# Patient Record
Sex: FEMALE | Race: BLACK OR AFRICAN AMERICAN | HISPANIC OR LATINO | Employment: UNEMPLOYED | URBAN - METROPOLITAN AREA
[De-identification: names, ages, dates, MRNs, and addresses within clinical notes are randomized per-mention and may not be internally consistent; named-entity substitution may affect disease eponyms.]

---

## 2017-01-12 ENCOUNTER — HOSPITAL ENCOUNTER (EMERGENCY)
Facility: HOSPITAL | Age: 52
Discharge: HOME | End: 2017-01-12
Admitting: EMERGENCY MEDICINE
Payer: COMMERCIAL

## 2017-01-12 VITALS
SYSTOLIC BLOOD PRESSURE: 165 MMHG | OXYGEN SATURATION: 98 % | DIASTOLIC BLOOD PRESSURE: 87 MMHG | WEIGHT: 161.16 LBS | HEART RATE: 61 BPM | RESPIRATION RATE: 16 BRPM | TEMPERATURE: 97.6 F

## 2017-01-12 DIAGNOSIS — J06.9 URI (UPPER RESPIRATORY INFECTION): Primary | ICD-10-CM

## 2017-01-12 PROCEDURE — 99283 EMERGENCY DEPT VISIT LOW MDM: CPT

## 2017-01-12 RX ORDER — GUAIFENESIN 200 MG/1
400 TABLET ORAL EVERY 4 HOURS PRN
Qty: 30 TABLET | Refills: 0 | Status: SHIPPED | OUTPATIENT
Start: 2017-01-12 | End: 2019-07-03

## 2017-01-12 RX ORDER — AZITHROMYCIN 250 MG/1
TABLET, FILM COATED ORAL
Qty: 6 TABLET | Refills: 0 | Status: SHIPPED | OUTPATIENT
Start: 2017-01-12 | End: 2019-07-03

## 2017-01-12 RX ADMIN — DEXAMETHASONE SODIUM PHOSPHATE 10 MG: 10 INJECTION, SOLUTION INTRAMUSCULAR; INTRAVENOUS at 20:11

## 2019-05-06 ENCOUNTER — HOSPITAL ENCOUNTER (EMERGENCY)
Facility: HOSPITAL | Age: 54
Discharge: HOME | End: 2019-05-06
Attending: EMERGENCY MEDICINE | Admitting: EMERGENCY MEDICINE
Payer: COMMERCIAL

## 2019-05-06 VITALS
OXYGEN SATURATION: 96 % | WEIGHT: 181 LBS | DIASTOLIC BLOOD PRESSURE: 78 MMHG | SYSTOLIC BLOOD PRESSURE: 119 MMHG | RESPIRATION RATE: 16 BRPM | TEMPERATURE: 96.9 F | HEART RATE: 65 BPM

## 2019-05-06 DIAGNOSIS — M54.9 BACK PAIN: ICD-10-CM

## 2019-05-06 DIAGNOSIS — M54.30 SCIATICA: Primary | ICD-10-CM

## 2019-05-06 PROCEDURE — 99283 EMERGENCY DEPT VISIT LOW MDM: CPT | Performed by: PHYSICIAN ASSISTANT

## 2019-05-06 PROCEDURE — 99283 EMERGENCY DEPT VISIT LOW MDM: CPT

## 2019-05-06 RX ORDER — ACETAMINOPHEN 325 MG/1
650 TABLET ORAL ONCE
Status: COMPLETED | OUTPATIENT
Start: 2019-05-06 | End: 2019-05-06

## 2019-05-06 RX ORDER — IBUPROFEN 600 MG/1
600 TABLET ORAL EVERY 6 HOURS PRN
Qty: 30 TABLET | Refills: 0 | Status: SHIPPED | OUTPATIENT
Start: 2019-05-06 | End: 2019-07-03

## 2019-05-06 RX ADMIN — ACETAMINOPHEN 650 MG: 325 TABLET ORAL at 16:38

## 2019-07-03 ENCOUNTER — APPOINTMENT (EMERGENCY)
Dept: RADIOLOGY | Facility: HOSPITAL | Age: 54
End: 2019-07-03
Payer: COMMERCIAL

## 2019-07-03 ENCOUNTER — HOSPITAL ENCOUNTER (EMERGENCY)
Facility: HOSPITAL | Age: 54
Discharge: HOME | End: 2019-07-04
Attending: EMERGENCY MEDICINE | Admitting: EMERGENCY MEDICINE
Payer: COMMERCIAL

## 2019-07-03 ENCOUNTER — TELEPHONE (OUTPATIENT)
Dept: OTHER | Facility: HOSPITAL | Age: 54
End: 2019-07-03

## 2019-07-03 VITALS
HEART RATE: 71 BPM | SYSTOLIC BLOOD PRESSURE: 174 MMHG | RESPIRATION RATE: 16 BRPM | DIASTOLIC BLOOD PRESSURE: 102 MMHG | OXYGEN SATURATION: 98 % | TEMPERATURE: 96.4 F | WEIGHT: 173.28 LBS

## 2019-07-03 DIAGNOSIS — S16.1XXA ACUTE STRAIN OF NECK MUSCLE, INITIAL ENCOUNTER: ICD-10-CM

## 2019-07-03 DIAGNOSIS — I10 HYPERTENSION: ICD-10-CM

## 2019-07-03 DIAGNOSIS — M54.2 NECK PAIN: Primary | ICD-10-CM

## 2019-07-03 LAB
ANION GAP SERPL CALCULATED.3IONS-SCNC: 7 MMOL/L (ref 5–14)
BACTERIA UR QL AUTO: ABNORMAL /HPF
BILIRUB UR QL STRIP: NEGATIVE
BUN SERPL-MCNC: 11 MG/DL (ref 5–25)
CALCIUM SERPL-MCNC: 8.7 MG/DL (ref 8.4–10.2)
CAOX CRY URNS QL MICRO: ABNORMAL /HPF
CHLORIDE SERPL-SCNC: 104 MMOL/L (ref 97–108)
CLARITY UR: ABNORMAL
CO2 SERPL-SCNC: 29 MMOL/L (ref 22–30)
COLOR UR: YELLOW
CREAT SERPL-MCNC: 0.48 MG/DL (ref 0.6–1.2)
GFR SERPL CREATININE-BSD FRML MDRD: 112 ML/MIN/1.73SQ M
GLUCOSE SERPL-MCNC: 152 MG/DL (ref 70–99)
GLUCOSE UR STRIP-MCNC: NEGATIVE MG/DL
HGB UR QL STRIP.AUTO: 10
KETONES UR STRIP-MCNC: ABNORMAL MG/DL
LEUKOCYTE ESTERASE UR QL STRIP: 100
NITRITE UR QL STRIP: NEGATIVE
NON-SQ EPI CELLS URNS QL MICRO: ABNORMAL /HPF
PH UR STRIP.AUTO: 6 [PH]
POTASSIUM SERPL-SCNC: 4.1 MMOL/L (ref 3.6–5)
PROT UR STRIP-MCNC: ABNORMAL MG/DL
RBC #/AREA URNS AUTO: ABNORMAL /HPF
SODIUM SERPL-SCNC: 140 MMOL/L (ref 137–147)
SP GR UR STRIP.AUTO: 1.02 (ref 1–1.04)
UROBILINOGEN UA: 4 MG/DL
WBC #/AREA URNS AUTO: ABNORMAL /HPF

## 2019-07-03 PROCEDURE — 81003 URINALYSIS AUTO W/O SCOPE: CPT | Performed by: PHYSICIAN ASSISTANT

## 2019-07-03 PROCEDURE — 99284 EMERGENCY DEPT VISIT MOD MDM: CPT

## 2019-07-03 PROCEDURE — 80048 BASIC METABOLIC PNL TOTAL CA: CPT | Performed by: PHYSICIAN ASSISTANT

## 2019-07-03 PROCEDURE — 36415 COLL VENOUS BLD VENIPUNCTURE: CPT | Performed by: PHYSICIAN ASSISTANT

## 2019-07-03 PROCEDURE — 72040 X-RAY EXAM NECK SPINE 2-3 VW: CPT

## 2019-07-03 PROCEDURE — 96372 THER/PROPH/DIAG INJ SC/IM: CPT

## 2019-07-03 PROCEDURE — 81001 URINALYSIS AUTO W/SCOPE: CPT | Performed by: PHYSICIAN ASSISTANT

## 2019-07-03 RX ORDER — METHOCARBAMOL 500 MG/1
500 TABLET, FILM COATED ORAL ONCE
Status: COMPLETED | OUTPATIENT
Start: 2019-07-03 | End: 2019-07-03

## 2019-07-03 RX ORDER — LISINOPRIL 10 MG/1
5 TABLET ORAL ONCE
Status: COMPLETED | OUTPATIENT
Start: 2019-07-04 | End: 2019-07-04

## 2019-07-03 RX ORDER — KETOROLAC TROMETHAMINE 30 MG/ML
15 INJECTION, SOLUTION INTRAMUSCULAR; INTRAVENOUS ONCE
Status: COMPLETED | OUTPATIENT
Start: 2019-07-03 | End: 2019-07-03

## 2019-07-03 RX ADMIN — KETOROLAC TROMETHAMINE 15 MG: 30 INJECTION, SOLUTION INTRAMUSCULAR; INTRAVENOUS at 22:19

## 2019-07-03 RX ADMIN — METHOCARBAMOL 500 MG: 500 TABLET, FILM COATED ORAL at 22:20

## 2019-07-04 PROCEDURE — 93005 ELECTROCARDIOGRAM TRACING: CPT

## 2019-07-04 PROCEDURE — 99284 EMERGENCY DEPT VISIT MOD MDM: CPT | Performed by: PHYSICIAN ASSISTANT

## 2019-07-04 RX ORDER — NITROFURANTOIN 25; 75 MG/1; MG/1
100 CAPSULE ORAL 2 TIMES DAILY
Qty: 10 CAPSULE | Refills: 0 | Status: SHIPPED | OUTPATIENT
Start: 2019-07-04 | End: 2019-09-06

## 2019-07-04 RX ORDER — METHOCARBAMOL 500 MG/1
500 TABLET, FILM COATED ORAL 2 TIMES DAILY
Qty: 20 TABLET | Refills: 0 | Status: SHIPPED | OUTPATIENT
Start: 2019-07-04 | End: 2019-09-06

## 2019-07-04 RX ORDER — ACETAMINOPHEN 325 MG/1
650 TABLET ORAL EVERY 6 HOURS PRN
Qty: 30 TABLET | Refills: 0 | Status: SHIPPED | OUTPATIENT
Start: 2019-07-04 | End: 2019-09-06

## 2019-07-04 RX ORDER — LISINOPRIL 5 MG/1
5 TABLET ORAL DAILY
Qty: 10 TABLET | Refills: 0 | Status: SHIPPED | OUTPATIENT
Start: 2019-07-04 | End: 2019-09-06

## 2019-07-04 RX ADMIN — LISINOPRIL 5 MG: 10 TABLET ORAL at 00:11

## 2019-07-04 NOTE — TELEPHONE ENCOUNTER
Received request from [facility] in the ER to schedule patient for appointment  She had elevated blood pressure and has been off her medications for a long time  We advised to resume lisinopril 5 mg since she was on it before and seemed to tolerate it  Confirmed that the provider verified phone number is correct in the chart  Can you please reach out to the patient and schedule an appointment? Thanks

## 2019-07-04 NOTE — ED PROVIDER NOTES
History  Chief Complaint   Patient presents with    Neck Pain     left sided, x3 days  took advil this morning  Sindy Wolf is a 49 y/o F with PMHx of HTN and noncompliance, asthma, and tobacco abuse who presents today for L sided neck pain x 3 days  The patient reports no headache/dizzines/blurred vision  She reports she is not taking her asthma medications  She states that she has not been to see her PCP since 2017  No n/v/d  The patient reports she awoke with her neck pain about 3 days ago  She has not taken anything for relief  She reports no numbness/tingling  No decreased strength or ROM  No trauma or injury  History provided by:  Patient  Neck Pain   Pain location:  L side  Quality:  Aching and cramping  Pain radiates to:  Does not radiate  Pain severity:  Moderate  Pain is:  Same all the time  Onset quality:  Gradual  Duration:  3 days  Timing:  Constant  Progression:  Worsening  Chronicity:  New  Context: not fall, not jumping from heights, not lifting a heavy object, not MCA, not MVC, not pedestrian accident and not recent injury    Relieved by:  NSAIDs  Worsened by:  Position  Ineffective treatments:  None tried  Associated symptoms: no bladder incontinence, no bowel incontinence, no chest pain, no fever, no headaches, no leg pain, no numbness, no paresis, no photophobia, no syncope, no tingling, no visual change, no weakness and no weight loss        None       Past Medical History:   Diagnosis Date    Asthma     Hypertension        Past Surgical History:   Procedure Laterality Date     SECTION      CHOLECYSTECTOMY         History reviewed  No pertinent family history  I have reviewed and agree with the history as documented      Social History     Tobacco Use    Smoking status: Current Every Day Smoker     Packs/day: 0 50     Types: Cigarettes    Smokeless tobacco: Never Used   Substance Use Topics    Alcohol use: No    Drug use: No        Review of Systems Constitutional: Negative  Negative for fever and weight loss  HENT: Negative  Eyes: Negative  Negative for photophobia  Respiratory: Negative  Cardiovascular: Negative  Negative for chest pain and syncope  Gastrointestinal: Negative  Negative for bowel incontinence  Endocrine: Negative  Genitourinary: Negative  Negative for bladder incontinence  Musculoskeletal: Positive for neck pain  Negative for arthralgias, back pain, gait problem, joint swelling, myalgias and neck stiffness  Skin: Negative  Allergic/Immunologic: Negative  Neurological: Negative  Negative for dizziness, tingling, tremors, seizures, syncope, facial asymmetry, speech difficulty, weakness, light-headedness, numbness and headaches  Hematological: Negative  Psychiatric/Behavioral: Negative  Physical Exam  Physical Exam   Constitutional: She is oriented to person, place, and time  She appears well-developed and well-nourished  HENT:   Head: Normocephalic and atraumatic  Right Ear: External ear normal    Left Ear: External ear normal    Mouth/Throat: No oropharyngeal exudate  Eyes: Pupils are equal, round, and reactive to light  EOM are normal    Neck: Normal range of motion  Tenderness noted on L side of c spine - muscle spasm palpated of paraspinal muscles  Cardiovascular: Normal rate, regular rhythm and normal heart sounds  No murmur heard  Pulmonary/Chest: Effort normal and breath sounds normal    Abdominal: Soft  Musculoskeletal:        Cervical back: She exhibits tenderness  She exhibits normal range of motion, no bony tenderness, no swelling, no edema, no deformity, no laceration, no pain, no spasm and normal pulse  Back:    Neurological: She is alert and oriented to person, place, and time  She has normal strength and normal reflexes  No cranial nerve deficit or sensory deficit  She displays a negative Romberg sign  Skin: Skin is warm   Capillary refill takes less than 2 seconds  Psychiatric: She has a normal mood and affect   Her behavior is normal  Judgment and thought content normal        Vital Signs  ED Triage Vitals   Temperature Pulse Respirations Blood Pressure SpO2   07/03/19 2053 07/03/19 2053 07/03/19 2053 07/03/19 2056 07/03/19 2053   (!) 96 4 °F (35 8 °C) 71 16 (!) 159/106 98 %      Temp Source Heart Rate Source Patient Position - Orthostatic VS BP Location FiO2 (%)   07/03/19 2053 07/03/19 2053 07/03/19 2053 07/03/19 2053 --   Tympanic Monitor Sitting Left arm       Pain Score       07/03/19 2219       Worst Possible Pain           Vitals:    07/03/19 2053 07/03/19 2056 07/03/19 2332   BP:  (!) 159/106 (!) 174/102   Pulse: 71     Patient Position - Orthostatic VS: Sitting  Lying         Visual Acuity  Visual Acuity      Most Recent Value   Visual acuity R eye is  20/40   Visual acuity Left eye is  20/40   Visual acuity in both eyes is  20/40          ED Medications  Medications   ketorolac (TORADOL) injection 15 mg (15 mg Intramuscular Given 7/3/19 2219)   methocarbamol (ROBAXIN) tablet 500 mg (500 mg Oral Given 7/3/19 2220)   lisinopril (ZESTRIL) tablet 5 mg (5 mg Oral Given 7/4/19 0011)       Diagnostic Studies  Results Reviewed     Procedure Component Value Units Date/Time    Basic metabolic panel [[id number]]  (Abnormal) Collected:  07/03/19 2257    Lab Status:  Final result Specimen:  Blood from Arm, Right Updated:  07/03/19 2315     Sodium 140 mmol/L      Potassium 4 1 mmol/L      Chloride 104 mmol/L      CO2 29 mmol/L      ANION GAP 7 mmol/L      BUN 11 mg/dL      Creatinine 0 48 mg/dL      Glucose 152 mg/dL      Calcium 8 7 mg/dL      eGFR 112 ml/min/1 73sq m     Narrative:       [place] guidelines for Chronic Kidney Disease (CKD):     Stage 1 with normal or high GFR (GFR > 90 mL/min/1 73 square meters)    Stage 2 Mild CKD (GFR = 60-89 mL/min/1 73 square meters)    Stage 3A Moderate CKD (GFR = 45-59 mL/min/1 73 square meters)   Stage 3B Moderate CKD (GFR = 30-44 mL/min/1 73 square meters)    Stage 4 Severe CKD (GFR = 15-29 mL/min/1 73 square meters)    Stage 5 End Stage CKD (GFR <15 mL/min/1 73 square meters)  Note: GFR calculation is accurate only with a steady state creatinine    Urine Microscopic [[id number]]  (Abnormal) Collected:  07/03/19 2257    Lab Status:  Final result Specimen:  Urine, Clean Catch Updated:  07/03/19 2313     RBC, UA None Seen /hpf      WBC, UA 4-10 /hpf      Epithelial Cells Occasional /hpf      Bacteria, UA Occasional /hpf      Ca Oxalate Anuja, UA Moderate /hpf     UA w Reflex to Microscopic w Reflex to Culture [[id number]]  (Abnormal) Collected:  07/03/19 2257    Lab Status:  Final result Specimen:  Urine, Clean Catch Updated:  07/03/19 2309     Color, UA Yellow     Clarity, UA Slightly Cloudy     Specific Gravity, UA 1 025     pH, UA 6 0     Leukocytes,  0     Nitrite, UA Negative     Protein, UA 30 (1+) mg/dl      Glucose, UA Negative mg/dl      Ketones, UA 5 (Trace) mg/dl      Bilirubin, UA Negative     Blood, UA 10 0     UROBILINOGEN UA 4 0 mg/dL                  XR cervical spine 2 or 3 views   Final Result by Farhan Stephen MD (07/03 1061)      Unremarkable cervical spine  Workstation performed: [id number]                    Procedures  ECG 12 Lead Documentation Only  Date/Time: 7/4/2019 12:21 AM  Performed by: Светлана Low PA-C  Authorized by: Светлана Low PA-C     ECG reviewed by me, the ED Provider: yes    Patient location:  ED  Interpretation:     Interpretation: normal    Rate:     ECG rate:  58    ECG rate assessment: normal    Rhythm:     Rhythm: sinus rhythm    Ectopy:     Ectopy: none    QRS:     QRS axis:  Normal  Conduction:     Conduction: normal    ST segments:     ST segments:  Normal  T waves:     T waves: normal    Q waves:     Q waves: I  Comments:      Suspect artifact for Q waves  Patient has NO chest pain  She is hypertensive   Requested an appointment with PCP           ED Course  ED Course as of Jul 05 1736 Wed Jul 03, 2019   2337 Paged family medicine      2341 Spoke to family medicine - recommend lisinopril 5mg daily  Her BP has chronically been high diastolically per family med  She is asymptomatic  She does not have headaches or visual changes  She is not dizzy or lightheaded  They will call her for an urgent appointment as an outpatient  MDM  Number of Diagnoses or Management Options  Acute strain of neck muscle, initial encounter:   Hypertension:   Neck pain:   Diagnosis management comments: This is a 47 y/o F who presents today for L sided neck pain  She has no trauma or injury  Her XR was negative for any acute process  Her symptoms resolved with medications  The patient was noted to be significantly hypertensive when here  She was asymptomatic of her HTN  Her EKG and BMP were negative for acute findings  Per notes, the patient's BP has been chronically this high for some time  She reports noncompliance with her lisinopril  Called family medicine who recommended the patient be d/c home on her normal medications and they will follow her in the clinic  I discussed warning signs or when to return to the patient - including headache, dizziness, or visual changes  Patient d/c home stable  Disposition  Final diagnoses:   Neck pain   Acute strain of neck muscle, initial encounter   Hypertension     Time reflects when diagnosis was documented in both MDM as applicable and the Disposition within this note     Time User Action Codes Description Comment    7/4/2019 12:04 AM Lora BRICEÑO Add [M54 2] Neck pain     7/4/2019 12:04 AM Lora Labmateoor R Add [S16  1XXA] Acute strain of neck muscle, initial encounter     7/4/2019 12:04 AM Lora BRICEÑO Add [I10] Hypertension       ED Disposition     ED Disposition Condition Date/Time Comment    Discharge Stable u Jul 4, 2019 12:04 AM Alvarado Grajeda discharge to home/self care  Follow-up Information     Follow up With Specialties Details Why Contact Info Additional [address] Family Medicine Schedule an appointment as soon as possible for a visit   [address], [address] [facility] [address]  [address], [address], [address] [place], [place], [address]          Discharge Medication List as of 7/4/2019 12:18 AM      START taking these medications    Details   acetaminophen (TYLENOL) 325 mg tablet Take 2 tablets (650 mg total) by mouth every 6 (six) hours as needed for mild pain or moderate pain, Starting Thu 7/4/2019, Print      lisinopril (ZESTRIL) 5 mg tablet Take 1 tablet (5 mg total) by mouth daily, Starting Thu 7/4/2019, Print      methocarbamol (ROBAXIN) 500 mg tablet Take 1 tablet (500 mg total) by mouth 2 (two) times a day, Starting Thu 7/4/2019, Print      nitrofurantoin (MACROBID) 100 mg capsule Take 1 capsule (100 mg total) by mouth 2 (two) times a day, Starting Thu 7/4/2019, Print           No discharge procedures on file      ED Provider  Electronically Signed by           Eliud Soria PA-C  07/05/19 6975

## 2019-07-08 LAB
ATRIAL RATE: 58 BPM
P AXIS: 58 DEGREES
PR INTERVAL: 178 MS
QRS AXIS: 25 DEGREES
QRSD INTERVAL: 80 MS
QT INTERVAL: 466 MS
QTC INTERVAL: 457 MS
T WAVE AXIS: 35 DEGREES
VENTRICULAR RATE: 58 BPM

## 2019-07-08 PROCEDURE — 93010 ELECTROCARDIOGRAM REPORT: CPT | Performed by: INTERNAL MEDICINE

## 2019-08-13 PROBLEM — I10 HYPERTENSION: Status: ACTIVE | Noted: 2019-08-13

## 2019-09-06 ENCOUNTER — OFFICE VISIT (OUTPATIENT)
Dept: FAMILY MEDICINE CLINIC | Facility: CLINIC | Age: 54
End: 2019-09-06

## 2019-09-06 VITALS
OXYGEN SATURATION: 98 % | WEIGHT: 177 LBS | RESPIRATION RATE: 18 BRPM | TEMPERATURE: 97.1 F | DIASTOLIC BLOOD PRESSURE: 82 MMHG | HEART RATE: 72 BPM | SYSTOLIC BLOOD PRESSURE: 126 MMHG

## 2019-09-06 DIAGNOSIS — G89.29 CHRONIC BILATERAL LOW BACK PAIN WITH BILATERAL SCIATICA: Primary | ICD-10-CM

## 2019-09-06 DIAGNOSIS — Z71.6 TOBACCO ABUSE COUNSELING: ICD-10-CM

## 2019-09-06 DIAGNOSIS — Z23 ENCOUNTER FOR IMMUNIZATION: ICD-10-CM

## 2019-09-06 DIAGNOSIS — Z12.11 SCREENING FOR COLON CANCER: ICD-10-CM

## 2019-09-06 DIAGNOSIS — M54.41 CHRONIC BILATERAL LOW BACK PAIN WITH BILATERAL SCIATICA: Primary | ICD-10-CM

## 2019-09-06 DIAGNOSIS — M54.42 CHRONIC BILATERAL LOW BACK PAIN WITH BILATERAL SCIATICA: Primary | ICD-10-CM

## 2019-09-06 DIAGNOSIS — K59.09 OTHER CONSTIPATION: ICD-10-CM

## 2019-09-06 DIAGNOSIS — Z12.4 CERVICAL CANCER SCREENING: ICD-10-CM

## 2019-09-06 DIAGNOSIS — Z12.39 BREAST CANCER SCREENING: ICD-10-CM

## 2019-09-06 DIAGNOSIS — F17.200 TOBACCO DEPENDENCE: ICD-10-CM

## 2019-09-06 DIAGNOSIS — Z71.85 VACCINE COUNSELING: ICD-10-CM

## 2019-09-06 PROBLEM — I10 ESSENTIAL HYPERTENSION: Status: RESOLVED | Noted: 2019-08-13 | Resolved: 2019-09-06

## 2019-09-06 PROCEDURE — 90471 IMMUNIZATION ADMIN: CPT | Performed by: FAMILY MEDICINE

## 2019-09-06 PROCEDURE — 99213 OFFICE O/P EST LOW 20 MIN: CPT | Performed by: FAMILY MEDICINE

## 2019-09-06 PROCEDURE — 90715 TDAP VACCINE 7 YRS/> IM: CPT | Performed by: FAMILY MEDICINE

## 2019-09-06 PROCEDURE — 3725F SCREEN DEPRESSION PERFORMED: CPT | Performed by: FAMILY MEDICINE

## 2019-09-06 RX ORDER — GABAPENTIN 100 MG/1
100 CAPSULE ORAL
Qty: 30 CAPSULE | Refills: 1 | Status: SHIPPED | OUTPATIENT
Start: 2019-09-06 | End: 2021-04-26 | Stop reason: SDUPTHER

## 2019-09-06 NOTE — PROGRESS NOTES
Chief Complaint   Patient presents with    Hypertension     pt had elevated bp in the er, took the medication but it is finished now  /82 (BP Location: Right arm, Patient Position: Sitting, Cuff Size: Standard)   Pulse 72   Temp (!) 97 1 °F (36 2 °C) (Temporal)   Resp 18   Wt 80 3 kg (177 lb)   SpO2 98%   Breastfeeding? No      Assessment/Plan     Low back pain with B/L sciatica  -no red flags  -home stretching exercises while awaiting PT  -trial of gabapentin 100 mg HS for symptomatic sciatica x few months    Constipation  -no red flags  -fluid and fiber advised    Health Maintenance  -never had pap smear, mammogram, or colonoscopy   -referrals sent and pap smear appointment requested at [place] [address]  -smoking cessation discussed, she is ready to quit and will try to cut down  -tetanus booster due, discussed with patient who agreed, vaccine administered    Episode of high blood pressure  -BP is 126/82  -Goal BP <140/<90  -No evidence of hypertension  -Agree with discontinuing antihypertensive agent      Handouts on above given to patient  RTO 2 months  Diagnoses and all orders for this visit:    Chronic bilateral low back pain with bilateral sciatica  -     gabapentin (NEURONTIN) 100 mg capsule; Take 1 capsule (100 mg total) by mouth daily at bedtime  -     Ambulatory referral to Physical Therapy; Future    Screening for colon cancer  -     Ambulatory referral to General Surgery; Future    Other constipation    Tobacco dependence    Tobacco abuse counseling    Breast cancer screening  -     Mammo screening bilateral w 3d & cad; Future    Cervical cancer screening    Encounter for immunization  -     TDAP VACCINE GREATER THAN OR EQUAL TO 8YO IM    Vaccine counseling       Subjective     Tad Gregory is a 48 y o  female  Information was obtained from chart review and the patient   The language used was [place], [facility] [place] and phone translation    Patient presents for appointment scheduled by ER for elevated blood pressure  She was started on lisinopril 5 mg but ran out and did not seek refill  Complains of low back pain for years  Now with shooting pain down both legs for last one month  Worse in the morning when she wakes up but persists throughout the day  Denies red flags including no acute change in bowel or bladder, saddle anesthesia, recent surgical manipulation or fevers  Denies history of sciatica in the past     Review of Systems   Constitutional: Negative for activity change, appetite change, chills, fever and unexpected weight change  Respiratory: Negative for shortness of breath  Cardiovascular: Negative for chest pain  Gastrointestinal: Positive for constipation  Negative for blood in stool, nausea and vomiting  Genitourinary: Negative for difficulty urinating and dysuria  Musculoskeletal: Positive for back pain  Psychiatric/Behavioral: Negative for behavioral problems  Pertinent items are noted in HPI  Social History  - reports that she has been smoking cigarettes  She has been smoking about 0 50 packs per day  She has never used smokeless tobacco   - reports that she does not drink alcohol    - reports that she does not use drugs  Objective     Physical Exam   Constitutional: She is oriented to person, place, and time  She appears well-developed and well-nourished  No distress  HENT:   Head: Normocephalic and atraumatic  Eyes: Conjunctivae are normal    Neck: Normal range of motion  Cardiovascular: Normal rate, regular rhythm, normal heart sounds and intact distal pulses  No murmur heard  Pulses:       Posterior tibial pulses are 2+ on the right side, and 2+ on the left side  Pulmonary/Chest: Effort normal and breath sounds normal  No respiratory distress  She has no wheezes  Abdominal: Soft  Bowel sounds are normal  She exhibits no distension  There is no tenderness  There is no guarding  Musculoskeletal: Normal range of motion  She exhibits edema (trace)  B/L straight leg raise positive  Neurological: She is alert and oriented to person, place, and time  She exhibits normal muscle tone  Coordination normal    Reflex Scores:       Patellar reflexes are 2+ on the right side and 2+ on the left side  Achilles reflexes are 2+ on the right side and 2+ on the left side  Psychiatric: She has a normal mood and affect  Her behavior is normal    Nursing note and vitals reviewed  Health Information     The following have been reviewed and updated: allergies, current medications and problem list    Allergies   Allergen Reactions    Penicillins        Current Outpatient Medications:     gabapentin (NEURONTIN) 100 mg capsule, Take 1 capsule (100 mg total) by mouth daily at bedtime, Disp: 30 capsule, Rfl: 1    Patient Active Problem List   Diagnosis    Other constipation    Tobacco dependence     Past Surgical History:   Procedure Laterality Date     SECTION      CHOLECYSTECTOMY       No family history on file    Health Maintenance   Topic Date Due    Hepatitis C Screening  1965    Depression Screening PHQ  1965    CRC Screening: Colonoscopy  1965    Pneumococcal Vaccine: Pediatrics (0 to 5 Years) and At-Risk Patients (6 to 59 Years) (1 of 1 - PPSV23) 1971    BMI: Adult  1983    DTaP,Tdap,and Td Vaccines (1 - Tdap) 1986    PAP SMEAR  1986    INFLUENZA VACCINE  2019    Pneumococcal Vaccine: 65+ Years (1 of 2 - PCV13) 2030    HEPATITIS B VACCINES  Aged Out

## 2019-09-06 NOTE — PATIENT INSTRUCTIONS
Ejercicios para la espalda baja   CUIDADO AMBULATORIO:   Los ejercicios para la espalda baja  ayudan a sanar y a fortalecer los músculos de reynolds espalda para evitar otra lesión  Pregúntele a reynolds médico si usted necesita acudir con un fisioterapeuta para que le indique ejercicios más avanzado  Busque atención médica de inmediato si:   · Usted tiene dolor severo que le impide moverse  Pregúntele a reynolds [place] vitaminas y minerales son adecuados para usted  · Reynolds dolor empeora  · Usted tiene un dolor nuevo  · Usted tiene preguntas o inquietudes acerca de reynolds condición o cuidado  Realice los ejercicios para la espalda baja de manera duffy:   · Elder nereyda ejercicios sobre chris colchoneta o superficie firme  (no en la cama) para luz soporte a la columna y evitar dolor en la parte baja de la espalda  · Muévase lenta y suavemente  Evite movimientos rápidos o bruscos  · Respire normalmente  No contenga la respiración  · Deténgase si siente dolor  Es normal que sienta cierta molestia al principio  Practicar los ejercicios con regularidad ayudará a disminuir reynolds incomodidad con el paso del Melani  Ejercicios para la espalda baja: Reynolds médico podría recomendarle que realice ejercicios para la espalda de 10 a 30 minutos cada día  También podría recomendarle que elder ejercicios 1 a 3 veces cada día  Pregunte a reynolds médico cuáles ejercicios son los mejores para usted y con qué frecuencia hacerlos  · Bombeo del tobillo:  Acuéstese boca arriba  Levante reynolds pie (con nereyda dedos apuntando hacia reynolds elisa)  Luego, baje reynolds pie (con los dedos apuntando lejos de usted)  Repita ant ejercicio 10 veces en cada lado  · Deslizamiento de talón:  Acuéstese boca arriba  Muy despacio doble chris pierna y luego enderécela  Luego, doble la otra pierna y enderécela  Repita 10 veces en cada lado  · Inclinación pélvica:  Acuéstese boca arriba con nereyda rodillas dobladas y nereyda pies planos sobre el piso   Coloque nereyda brazos en chris posición relajada junto a reynolds cuerpo  Contraiga los músculos de reynolds abdomen y aplane reynolds espalda contra el piso  Sostenga está posición por 5 segundos  Repita 5 veces  · Estiramiento de la espalda:  Acuéstese boca arriba con nereyda lea detrás de reynolds elisa  Doble nereyda rodillas y gire la mitad de reynolds cuerpo hacia un lado  Mantenga esta posición por 10 segundos  Repita 3 veces en cada lado  · Levantamiento de la pierna estirada:  Acuéstese boca Gerilyn Ruder con chris pierna estirada  Doble la otra rodilla  Contraiga reynolds abdomen y luego levante lentamente la pierna estirada entre 6 a 12 pulgadas del piso  Mantenga esta posición por 1 a 5 segundos  Baje reynolds pierna lentamente  Repita 10 veces en cada pierna  · Rodillas al pecho:  Acuéstese boca arriba con nereyda rodillas dobladas y nereyda pies planos sobre el piso  [place] chris de las rodillas hacia reynolds pecho y sosténgala por 5 segundos  Regrese reynolds pierna a la posición inicial  Levante la otra rodilla hacia el pecho y sosténgala por 5 segundos  Quynh esto 5 veces en cada lado  · Posición berhane camello:  Coloque nereyda lea y [employer]  Arquee reynolds espalda Terry Anis, hacia el techo y [place]  Arquee reynolds payal dorsal lo más posible  Sostenga está posición por 5 segundos  Levante reynolds elisa hacia arriba y baje reynolds pecho hacia el piso  Sostenga está posición por 5 segundos  Quynh 3 series o nelson se le indique  · Posición de cuclillas contra la pared:  Párese con reynolds espalda contra la pared  Contraiga los músculos de reynolds abdomen  Lentamente deslice reynolds cuerpo hasta que nereyda rodillas queden dobladas en un ángulo de 45 grados  Mantenga esta posición por 5 segundos  Deslice lentamente reynolds espalda hacia arriba hasta quedar de pie  Repita 10 veces  · Posición de acurrucarse:  Acuéstese boca arriba con nereyda rodillas dobladas y nereyda pies planos sobre el piso   Coloque nereyda lea con las yamini hacia abajo debajo de la curva de la parte baja de rodriguez espalda  Después, con nereyda codos [employer], levante nereyda hombros y [place] 2 a 3 pulgadas  Mantenga rodriguez elisa a la misma altura de nereyda hombros  Mantenga esta posición por 5 segundos  Cuando usted pueda hacer ant ejercicio sin sentir dolor por 10 a 15 segundos, puede entonces añadir chris rotación  Mientras nereyda hombros y rodriguez pecho estén levantados del piso, voltee levemente hacia la izquierda y [place]  Repita en el otro lado  · Ejercicio pájaro abida:  Coloque nereyda lea y rodillas sobre el piso  Mantenga nereyda muñecas directamente debajo de nereyda hombros y nereyda rodillas directamente debajo de nereyda caderas  Contraiga rodriguez ombligo hacia adentro en dirección a rodriguez columna  No estire ni arquee rodriguez espalda  Ponga tensos nereyda músculos abdominales  Levante un brazo extendido para que se alinee con rodriguez elisa  Luego, levante la pierna opuesta a rodriguez brazo  Mantenga esta posición por 15 segundos  Baje rodriguez Leslie Stacy y pierna lentamente y Manohar de lado  Quynh 5 series  © 2017 2600 [facility] Information is for End User's use only and may not be sold, redistributed or otherwise used for commercial purposes  All illustrations and images included in CareNotes® are the copyrighted property of A D A M , Inc  or Kan Moreland  Esta información es sólo para uso en educación  Rodriguez intención no es darle un consejo médico sobre enfermedades o tratamientos  Colsulte con rodriguez Benuel Dixon farmacéutico antes de seguir cualquier régimen médico para saber si es seguro y efectivo para usted  Colonoscopia   CUIDADO AMBULATORIO:   Lo que necesita saber sobre chris colonoscopia:  Chris colonoscopia es un procedimiento para examinar con un endoscopio el interior de rodriguez colon (intestino)  La sonda es un tubo flexible con chris enio pequeña y Marlaine Deal en la punta  Allison chris colonoscopia, es posible que le retiren pólipos o crecimientos de tejidos         Lo que debe hacer la semana antes de la colonoscopia:  Usted necesitará dejar de mandie los medicamentos que contienen aspirina o lizzy krista 7 días antes de reynolds colonoscopia  Si usted yan anticoagulantes, nelson warfarina, pregunte cuándo debería dejar de tomarlos  Póngase de acuerdo con alguien para que lo lleve a reynolds casa después del procedimiento  Cómo prepararse para la colonoscopia:  Reynolds médico le pedirá que prepare amara intestinos antes de reynolds procedimiento  Amara intestinos necesitarán estar vacíos antes de reynolds procedimiento para permitirle que macario reynolds colon claramente  Usted necesitará hacer lo siguiente el día antes de reynolds procedimiento:  · Solo tome líquidos mateo  todo el día antes de reynolds colonoscopia  La dieta de líquidos mateo incluye jugos de fruta y caldos livianos, gelatina saborizada Wilhelmenia Lata y caramelos duros  También incluye café, té, bebidas gaseosas y bebidas deportivas claras  · Siga la preparación de amara intestinos nelson se le indicó  Existen diferentes preparaciones que le pueden luz para antes de chris colonoscopia  Algunas se administran por 2 horas y otras por más de 6 horas  Algunas se administran temprano en la tarde del día anterior a la colonoscopía  Otras se administran el día antes y luego en la mañana de la colonoscopia  Con cualquier preparación de intestinos, permanezca cerca del baño  Carmela líquido le provocará que tenga evacuaciones intestinales frecuentemente  · Un enema  podría ser necesaria  Reynolds médico podría indicarle que use un enema para limpiar amara intestinos  · No coma o tome nada después de la medianoche  [place] ayudará a evitar problemas que pueden suceder si usted vomita mientras está bajo anestesia  Qué sucederá krista la colonoscopia:   · Le administrarán medicamento para ayudarlo a relajarse  Se recostará sobre el [facility] y [facility] [address] reynolds pecho  Reynolds médico le examinará el ano y utilizará un dedo para revisar reynolds recto   Si reynolds intestino no está vacío le pueden administrar otro enema  El colonoscopio se [place] y se colocará suavemente en reynolds ano  Luego se pasará por el recto hacia el colon  Glorine Dy agua o aire en reynolds colon para ayudar a limpiarlo o ensancharlo  [place] lo realizará reynolds médico para poder observar con claridad reynolds colon  · Se pueden mandie muestras de tejido de las kay de reynolds intestino y enviarlas al laboratorio para ser analizadas  En ana de tener pólipos, reynolds médico utiliza un alambre con chris argolla al final que pasará por el interior del endoscopio y lo usará para sostener el pólipo  Luego el pólipo será quemado o cortado de la pared del colon  Los pólipos extraídos serán enviados al laboratorio para ser [employer]  Le pueden mandie imágenes del colon krista el procedimiento  El endoscopio será retirado cuando el procedimiento se termine  Qué sucederá después de la colonoscopia:   · Descanse después de reynolds procedimiento  Usted podría sentirse inflamado, tener gases y [place] abdominal  Es posible que necesite acostarse sobre reynolds costado derecho con chris almohada térmica sobre reynolds abdomen  Posiblemente necesite caminar un poco para ayudarse a expulsar los gases  Si se siente inflamado, coma comidas pequeñas  No maneje o tome decisiones importantes hasta el día siguiente de reynolds procedimiento  · Es posible que le extraigan los pólipos  No tome aspirina o realice viajes largos en phoebe dentro de los 7 días después de reynolds procedimiento  Pregunte a reynolds médico acerca de cualquier otras limitaciones después de reynolds procedimiento  Los riesgos de chris colonoscopía:  Usted podría sentir dolor o sangrar después de que remuevan el endoscopio y los pólipos  Es posible que también tenga latido cardíaco lento, disminución de la presión arterial o aumento de la sudoración  Reynolds colon podría sufrir un desgarre debido al aumento de presión del endoscopio y de otros instrumentos  [place] podría provocar que el contenido de nereyda intestinos se vacíe de reynolds colon a reynolds abdomen   Si esto sucede, usted tendrá [facility] y Carie Janett cirugía en reynolds colon  Busque atención médica de inmediato si:   · Usted presenta chris cantidad dwayne de jamey abel brillante en nereyda evacuaciones intestinales  · Reynolds abdomen está chace y firme y usted siente dolor intenso  · Usted tiene dificultad repentina para respirar  Pregúntele a reynolds [place] vitaminas y minerales son adecuados para usted  · Usted presenta sarpullido o urticaria  · Usted tiene fiebre dentro de las 24 horas después de reynolds procedimiento  · Usted no ha tenido chris evacuación intestinal después de 3 días de reynolds procedimiento  · Usted tiene preguntas o inquietudes acerca de reynolds condición o cuidado  Actividad:   · No levante nada, no se esfuerce o corra  por 3 días después de reynolds procedimiento  · Descanse después de reynolds procedimiento  A usted le ignacio administrado medicamento para relajarse  No  maneje o tome decisiones importantes hasta el día siguiente de reynolds procedimiento  Regrese a nereyda actividades normales según le indiquen  · Alivie los gases y la incomodidad de la inflamación  acostándose en reynolds costado derecho con chris almohada térmica sobre reynolds abdomen  Es posible que necesite caminar un poco para ayudar a eliminar los gases  Coma comidas pequeñas hasta que se alivie de la inflamación  Si a usted le removieron pólipos:  Por 7 días después de reynolds procedimiento:  · No  tome aspirina  · No  realice paseos largos en phoebe  Ayude a prevenir el estreñimiento:   · Consuma alimentos saludables y variados  Los alimentos saludables incluyen fruta, vegetales, panes integrales, productos lácteos bajo en grasa, frijoles, mari sin grasa, y pescado  Pregunte si necesita seguir chris dieta especial  Reynolds médico puede recomendarle que coma alimentos ricos en fibra, nelson frijoles cocidos  La fibra lo ayuda a tener evacuaciones intestinales regulares  · [address] [place] se le haya indicado    Los adultos deberían de beber [employer] 9 a 13 vasos de 8 onzas de líquidos cada día  Pregunte cuál es la cantidad [place] para usted  Para [place], los mejores líquidos son [place], y [place]  · Ejercítese según indicaciones  Consulte con rodriguez médico acerca de cuál es el mejor régimen de ejercicio para usted  El ejercicio puede ayudar a prevenir estreñimiento, reducir rodriguez presión arterial y [employer]  Acuda a nereyda consultas de control con rodriguez médico según le indicaron  Anote nereyda preguntas para que se acuerde de hacerlas krista nereyda visitas  © 2017 26[address] [place] Information is for End User's use only and may not be sold, redistributed or otherwise used for commercial purposes  All illustrations and images included in CareNotes® are the copyrighted property of A D A M , Inc  or Kan Moreland  Esta información es sólo para uso en educación  Rodriguez intención no es darle un consejo médico sobre enfermedades o tratamientos  Colsulte con rodriguez [place] farmacéutico antes de seguir cualquier régimen médico para saber si es seguro y efectivo para usted  Dieta con alto contenido de [place]   CUIDADO AMBULATORIO:   [place] dieta con alto contenido de fibra  incluye alimentos con chris bib cantidad de fibra  La fibra es la parte de las frutas, los vegetales y los granos, que no se descompone al ser ingerida por rodriguez cuerpo  La fibra ayuda a regular las evacuaciones intestinales  La fibra además ayuda a bajar el colesterol, controlar la glucosa en la jamey en las personas que sufren de diabetes y para aliviar el estreñimiento  También la fibra podría ayudarle a controlar rodriguez peso debido a que le da la sensación de llenura más rápidamente  La mayoría de los adultos deberían consumir entre 25 a 35 gramos de fibra al día  Consulte con rodriguez dietista o médico sobre la cantidad de fibra que usted necesita    Buenas iglesias de fibra:   · Alimentos que contienen al menos 4 gramos de fibra por porción:      ¨ ? a ½ de chris taza de cereal con alto contenido de fibra (demetrio la etiqueta nutricional en la caja)    ¨ ½ taza de moras o frambuesas    ¨ 4 ciruelas pasas    ¨ 1 alcachofa cocida    ¨ ½ taza de legumbres cocidas, nelson lentejas o frijoles rojos o pintos    · Asthmatx contienen 1 a 3 gramos de [place] por porción:      ¨ 1 rebanada de pan de jorge l integral, pan integral de soares o pan de soares    ¨ ½ taza de arroz integral cocido    ¨ 4 galletas integrales    ¨ 1 taza de rubina    ¨ ½ taza de cereal con 1 a 3 gramos de [place] por porción (deemtrio la etiqueta nutricional en la caja)    ¨ 1 porción pequeña de fruta nelson manzana, banana, kimberly, kiwi o naranja    ¨ 3 dátiles    ¨ ½ taza de albaricoques enlatados, ensalada de frutas, duraznos o peras    ¨ ½ taza de verduras crudas o cocidas, nelson zanahorias, coliflor, repollo, espinaca, calabaza o maíz  Formas en que puede aumentar la fibra en rodriguez dieta:   · Escoja arroz integral o melvin en vez de arroz jarvis      · Use harina de grano integral en las recetas en vez de harina arron  · Darleene Graver y arvejas a los guisos o las sopas  · Iram Fee y verduras frescas con la cascara en vez de jugos  Otras pautas dietéticas que debe seguir:   · Denette Cam[place] a rodriguez dieta lentamente  Usted podría presentar malestar o inflamación estomacal y gases si añade fibra a rodriguez dieta demasiado rápido  · [place] mucho líquido a medida que agrega fibra a rodriguez dieta  Es posible que tenga náuseas o desarrolle estreñimiento si no yan suficiente agua  Pregunte cuánto líquido debe mandie cada día y cuáles líquidos son los más adecuados para usted  © 2017 26[address] [place] Information is for End User's use only and may not be sold, redistributed or otherwise used for commercial purposes  All illustrations and images included in CareNotes® are the copyrighted property of A ROB A M , Inc  or Kan Moreland  Esta información es sólo para uso en educación   Rodriguez intención no es darle un consejo [employer] o tratamientos  Colsulte con reynolds [employer] farmacéutico antes de seguir cualquier régimen médico para saber si es seguro y efectivo para usted  Cómo dejar de fumar   CUIDADO AMBULATORIO:   Usted mejorará reynolds stevie y [address] a reynolds alrededor  si usted magdalena de fumar  Reynolds riesgo de enfermedades cardíacas y pulmonares, cáncer, derrames cerebrales, ataques cardíacos y problemas de vista también [address]  Usted se puede beneficiar al dejar de fumar sin importar por cuanto tiempo haya fumado  Prepárese para dejar de fumar:  La nicotina es chris droga muy adictiva que se encuentra en los cigarrillos  Los síntomas de abstinencia pueden suceder cuando usted magdalena de fumar y, por lo tanto, dificultan el [place]  Estos síntomas incluyen ansiedad, depresión, irritabilidad, dificultad para dormir y aumento del apetito  Usted puede aumentar nereyda probabilidad de éxito para dejar de fumar si se prepara con anticipación  · Fije chris fecha para dejar de fumar  Elegir chris fecha dentro de las próximas 2 semanas  No escoja un día que usted piensa que puede ser estresante u ocupado  Anote el día, o adebayo un círculo en el calendario  · Infórmele a nereyda amigos y kwabena que usted planea dejar de fumar  Explique que usted podría sufrir de síntomas de abstinencia cuando trata de dejar de fumar  Pídales que lo apoyen  Es posible que ellos lo animen y le ayuden a reducir el estrés para que sea más fácil dejarlo  · Quynh chris lista de nereyda razones para dejar de fumar  Ponga la lista en algún sitio donde lo macario cada día, nelson el refrigerador  Puede mirar la lista cuando tenga un antojo  · Elimine todos los productos de tabaco y nicotina que tenga en reynolds casa, phoebe y lugar de [place]  También, elimine cualquier otra cosa que lo tiente a usted para fumar, nelson encendedores, cerillos o monie  Limpie reynolds coche, casa y lugares de trabajo que [employer] a humo   [place] a humo puede desencadenar un deseo  · Identifique los desencadenantes que adrianne ganas de fumar  Justice puede incluir actividades, sentimientos o personas  También anote 1 manera en que puede tratar cada kimberlee de nereyda factores desencadenantes  Por ejemplo, si quiere fumar tan pronto nelson se despierta, planee otra [address] [facility], nelson el ejercicio  · Quynh un plan de cómo dejará de fumar  Conozca las herramientas que pueden ayudar a dejar, nelson terapia de reemplazo de [place], [place] y asesoría  Elija al menos 2 opciones para ayudarse a dejar de fumar  Opciones que le pueden ayudar a dejar de fumar:   · La terapia  de un médico calificado le puede proveer con apoyo e ideas para dejar de fumar  También le enseñará a controlar nereyda síntomas de abstinencia y antojos  Usted podría recibir consejería de un consejero, terapia en maribel, o por medio de chris terapia telefónica conocida nelson quit line (la línea para dejar de fumar)  · La terapia de reemplazo de nicotina (NRT, por nereyda siglas en \[facility]\"")  nelson los parches de nicotina, la goma de mascar o las pastillas podrían ayudar a reducir nereyda antojos de nicotina  Usted puede [employer] parches y otros artículos sin necesidad de receta médica  No use cigarrillos electrónicos o tabaco sin humo en vez de cigarrillos o para tratar de dejar de fumar  Todos estos aún contienen nicotina  · Los medicamentos recetados  [employer] atomizadores nasales o los inhaladores de nicotina podrían ayudar a reducir nereyda síntomas de abstinencia  Otros medicamentos también podrían usarse para reducir nereyda ganas de fumar  Pregúntele a reynolds médico sobre [facility]  Es probable que usted necesite empezar a mandie ciertos medicamentos 2 semanas antes de reynolds fecha programada para dejar de fumar para que los mismos funcionen valarie  · La hipnosis  es chris práctica que ayuda a guiarlo a través de pensamientos y sentimientos   La hipnosis puede ayudar a disminuir nereyda antojos y que esté más dispuesto a dejar de fumar  · La terapia de la acupuntura  [place] agujas muy delgadas para equilibrar los torrez de energía en el cuerpo  Se ama que esto ayuda a disminuir los antojos y los síntomas de abstinencia de la nicotina  · Los grupos de apoyo  le permiten hablar con otros que están tratando de dejar de fumar o ya ignacio dejado  Puede ser útil hablar con otros sobre cómo dejar el hábitp  Controle nereyda antojos:   · Evite situaciones, personas y lugares que lo tientan a usted a fumar  Vaya a lugares donde no se permite fumar, nelson bibliotecas o restaurantes  Sepa cuáles son las cosas que lo [place] y trate de evitarlas  · Mantenga nereyda lea ocupadas  Sostenga en reynolds mano chris pelota para el estrés o chris pluma  · Póngase un caramelo o palillo de dientes en la boca  Tenga siempre disponible piruletas, chicles sin azúcar o palillos  · No consuma alcohol ni cafeína  Estas bebidas podrían tentarlo a fumar  [place] líquidos sanos nelson agua o jugo en vez  · Dese chris recompensa cuando logra resistir nereyda antojos  Las recompensas lo motivarán y ayudarán a estar positivo  · Quynh chris actividad que lo distraiga de reynolds antojo  Por ejemplo salir a caminar, hacer ejercicio o [place] Umer  Prevenga el aumento de peso después de dejar de fumar:  Usted podría subir unas cuantas libras después de dejar de fumar  Es más saludable para usted subir un poco de peso que continuar fumando  Lo siguiente puede ayudarlo a prevenir el aumento de peso:  · Consuma alimentos saludables  Las frutas, verduras, panes integrales, productos lácteos bajos en grasa, frijoles, mari magras y pescado son Sabino Saenz saludables para el corazón  [place] meriendas saludables, nelson yogur bajo en [place], en ana de sentirse hambriento entre comidas  · [place] agua antes, krista y [place] comidas  [place] hará que reynolds estómago se sienta lleno y ayudará a evitar que coma en exceso   Pregunte a reynolds médico sobre la cantidad de líquido que necesita mandie [employer] vickie y cuáles le recomienda  · Ejercicio  Salga a caminar o practique algún tipo de ejercicio cada día  Pregúntele a rodriguez médico cuáles ejercicios son correctos para usted  El ejercicio podría ayudarle a reducir nereyda antojos y reducir el estrés  Para mayor apoyo e información:   · [employer]  Phone: [phone]  Web Address: www [place]  © 2017 2600 [address] [place] Information is for End User's use only and may not be sold, redistributed or otherwise used for commercial purposes  All illustrations and images included in CareNotes® are the copyrighted property of A D A M , Inc  or Kan Moreland  Esta información es sólo para uso en educación  Rodriguez intención no es darle un consejo médico sobre enfermedades o tratamientos  Colsulte con rodriguez [employer] farmacéutico antes de seguir cualquier régimen médico para saber si es seguro y efectivo para usted  Dieta para úlceras estomacales y gastritis   CUIDADO AMBULATORIO:   Chris dieta para úlceras estomacales y gastritis  es un plan alimenticio que limita los alimentos que irritan rodriguez BJURHOLM  Ciertos alimentos [employer] síntomas nelson dolor de RODRIGO, Yann, acidez estomacal o indigestión  Alimentos que debe limitar o evitar:  Es posible que usted necesite evitar los alimentos ácidos, picantes o altos en grasa  No todos los alimentos afectan a las personas de la [address]  [employer]  Usted necesitará aprender cuáles alimentos empeoran nereyda síntomas y tendrá que limitar esos alimentos   Los siguientes son algunos alimentos que podrían empeorar chris úlcera o los síntomas de la gastritis:  · Bebidas:      ¨ Leche entera y [place] chocolatada    ¨ Chocolate caliente y refrescos de cola    ¨ Cualquier bebida con cafeína    ¨ Café regular y descafeinado    ¨ Té de hierbabuena y menta meghana    ¨ Té meghana o thai, regular o descafeinado    Jugos de [facility] y [place] alcohólicas    · Especias y condimentos:      Jerry Lent y abel    ¨ Polvo de [facility]    ¨ [place] de [email or web address] y [facility]    · Otros alimentos:      ¨ Alimentos lácteos hechos de leche entera o crema    ¨ Chocolate    ¨ Quesos picantes o de sabor carlos, nelson de [employer] o shay jean    ¨ Carne con alto contenido de [place] y muy condimentada, nelson el chorizo, salchichón, tocino, Niki y embutidos    ¨ Chiles picantes    ¨ Productos derivados del tomate, nelson pasta de [place], salsa o jugo del mismo  Alimentos que puede incluir:  Consuma chris variedad de alimentos saludables de todos los grupos alimenticios  Consuma frutas, verduras, granos enteros y productos lácteos descremados o bajos en grasa  Los granos [employer], los cereales, la pasta y el arroz integral  Elija la carne [place], aves de grayson (windy y Justin), pescado, frijoles, huevos y kodak secos  Un plan alimenticio saludable tiene un contenido bajo de grasas no saludables, sal y azúcar  Las grasas saludables incluyen el aceite de clark y de canola  Solicite a rodriguez dietista más información acerca de un plan alimenticio saludable  Otras pautas útiles:   · No coma salud antes de acostarse  Deje de comer por lo menos 2 horas antes de acostarse  · Coma comidas pequeñas y con frecuencia  Es probable que rodriguez estómago tolere mejor comidas pequeñas y frecuentes en vez de comidas grandes  © 2017 [address] Information is for End User's use only and may not be sold, redistributed or otherwise used for commercial purposes  All illustrations and images included in CareNotes® are the copyrighted property of A D A M , Inc  or Kan Moreland  Esta información es sólo para uso en educación  Rodriguez intención no es darle un consejo médico sobre enfermedades o tratamientos  Colsulte con rodriguez Ozell Fabry farmacéutico antes de seguir cualquier régimen médico para saber si es seguro y efectivo para usted

## 2019-09-20 ENCOUNTER — TELEPHONE (OUTPATIENT)
Dept: FAMILY MEDICINE CLINIC | Facility: CLINIC | Age: 54
End: 2019-09-20

## 2019-09-20 NOTE — TELEPHONE ENCOUNTER
Pt given all info    Mammo apt([phone]) is on 11/6/2019 at 2:20 pm at [address], [address], [place]

## 2019-09-27 ENCOUNTER — APPOINTMENT (EMERGENCY)
Dept: CT IMAGING | Facility: HOSPITAL | Age: 54
End: 2019-09-27
Payer: COMMERCIAL

## 2019-09-27 ENCOUNTER — APPOINTMENT (EMERGENCY)
Dept: RADIOLOGY | Facility: HOSPITAL | Age: 54
End: 2019-09-27
Payer: COMMERCIAL

## 2019-09-27 ENCOUNTER — HOSPITAL ENCOUNTER (EMERGENCY)
Facility: HOSPITAL | Age: 54
Discharge: HOME | End: 2019-09-27
Attending: EMERGENCY MEDICINE | Admitting: EMERGENCY MEDICINE
Payer: COMMERCIAL

## 2019-09-27 VITALS
DIASTOLIC BLOOD PRESSURE: 98 MMHG | SYSTOLIC BLOOD PRESSURE: 160 MMHG | TEMPERATURE: 97 F | OXYGEN SATURATION: 97 % | WEIGHT: 176.8 LBS | HEART RATE: 82 BPM | RESPIRATION RATE: 16 BRPM

## 2019-09-27 DIAGNOSIS — S22.39XA RIB FRACTURE: ICD-10-CM
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
## 2019-09-27 LAB
ANION GAP SERPL CALCULATED.3IONS-SCNC: 5 MMOL/L (ref 5–14)
BASOPHILS # BLD AUTO: 0.1 THOUSANDS/ΜL (ref 0–0.1)
BASOPHILS NFR BLD AUTO: 1 % (ref 0–1)
BUN SERPL-MCNC: 17 MG/DL (ref 5–25)
CALCIUM SERPL-MCNC: 9.6 MG/DL (ref 8.4–10.2)
CHLORIDE SERPL-SCNC: 100 MMOL/L (ref 97–108)
CO2 SERPL-SCNC: 35 MMOL/L (ref 22–30)
CREAT SERPL-MCNC: 0.63 MG/DL (ref 0.6–1.2)
EOSINOPHIL # BLD AUTO: 0 THOUSAND/ΜL (ref 0–0.4)
EOSINOPHIL NFR BLD AUTO: 1 % (ref 0–6)
ERYTHROCYTE [DISTWIDTH] IN BLOOD BY AUTOMATED COUNT: 14 %
GFR SERPL CREATININE-BSD FRML MDRD: 118 ML/MIN/1.73SQ M
GLUCOSE SERPL-MCNC: 126 MG/DL (ref 70–99)
HCT VFR BLD AUTO: 45.4 % (ref 36–46)
HGB BLD-MCNC: 15 G/DL (ref 12–16)
LYMPHOCYTES # BLD AUTO: 1.2 THOUSANDS/ΜL (ref 0.5–4)
LYMPHOCYTES NFR BLD AUTO: 18 % (ref 25–45)
MCH RBC QN AUTO: 27 PG (ref 26–34)
MCHC RBC AUTO-ENTMCNC: 33.1 G/DL (ref 31–36)
MCV RBC AUTO: 82 FL (ref 80–100)
MONOCYTES # BLD AUTO: 0.5 THOUSAND/ΜL (ref 0.2–0.9)
MONOCYTES NFR BLD AUTO: 7 % (ref 1–10)
NEUTROPHILS # BLD AUTO: 4.8 THOUSANDS/ΜL (ref 1.8–7.8)
NEUTS SEG NFR BLD AUTO: 74 % (ref 45–65)
PLATELET # BLD AUTO: 136 THOUSANDS/UL (ref 150–450)
PMV BLD AUTO: 8.2 FL (ref 8.9–12.7)
POTASSIUM SERPL-SCNC: 4.5 MMOL/L (ref 3.6–5)
RBC # BLD AUTO: 5.56 MILLION/UL (ref 4–5.2)
SODIUM SERPL-SCNC: 140 MMOL/L (ref 137–147)
WBC # BLD AUTO: 6.6 THOUSAND/UL (ref 4.5–11)

## 2019-09-27 PROCEDURE — 74177 CT ABD & PELVIS W/CONTRAST: CPT

## 2019-09-27 PROCEDURE — 99285 EMERGENCY DEPT VISIT HI MDM: CPT | Performed by: EMERGENCY MEDICINE

## 2019-09-27 PROCEDURE — 85025 COMPLETE CBC W/AUTO DIFF WBC: CPT | Performed by: EMERGENCY MEDICINE

## 2019-09-27 PROCEDURE — 96374 THER/PROPH/DIAG INJ IV PUSH: CPT

## 2019-09-27 PROCEDURE — 73110 X-RAY EXAM OF WRIST: CPT

## 2019-09-27 PROCEDURE — 80048 BASIC METABOLIC PNL TOTAL CA: CPT | Performed by: EMERGENCY MEDICINE

## 2019-09-27 PROCEDURE — 70450 CT HEAD/BRAIN W/O DYE: CPT

## 2019-09-27 PROCEDURE — 70486 CT MAXILLOFACIAL W/O DYE: CPT

## 2019-09-27 PROCEDURE — 99284 EMERGENCY DEPT VISIT MOD MDM: CPT

## 2019-09-27 PROCEDURE — 36415 COLL VENOUS BLD VENIPUNCTURE: CPT | Performed by: EMERGENCY MEDICINE

## 2019-09-27 PROCEDURE — 71260 CT THORAX DX C+: CPT

## 2019-09-27 PROCEDURE — 29125 APPL SHORT ARM SPLINT STATIC: CPT | Performed by: EMERGENCY MEDICINE

## 2019-09-27 PROCEDURE — 72125 CT NECK SPINE W/O DYE: CPT

## 2019-09-27 PROCEDURE — 12011 RPR F/E/E/N/L/M 2.5 CM/<: CPT | Performed by: EMERGENCY MEDICINE

## 2019-09-27 PROCEDURE — 93005 ELECTROCARDIOGRAM TRACING: CPT

## 2019-09-27 RX ORDER — NAPROXEN 500 MG/1
500 TABLET ORAL 2 TIMES DAILY WITH MEALS
Qty: 10 TABLET | Refills: 0 | Status: SHIPPED | OUTPATIENT
Start: 2019-09-27 | End: 2020-04-07 | Stop reason: HOSPADM

## 2019-09-27 RX ORDER — NAPROXEN 500 MG/1
500 TABLET ORAL 2 TIMES DAILY WITH MEALS
Qty: 10 TABLET | Refills: 0 | Status: SHIPPED | OUTPATIENT
Start: 2019-09-27 | End: 2019-09-27 | Stop reason: SDUPTHER

## 2019-09-27 RX ORDER — KETOROLAC TROMETHAMINE 30 MG/ML
30 INJECTION, SOLUTION INTRAMUSCULAR; INTRAVENOUS ONCE
Status: COMPLETED | OUTPATIENT
Start: 2019-09-27 | End: 2019-09-27

## 2019-09-27 RX ORDER — LIDOCAINE HYDROCHLORIDE 10 MG/ML
5 INJECTION, SOLUTION EPIDURAL; INFILTRATION; INTRACAUDAL; PERINEURAL ONCE
Status: COMPLETED | OUTPATIENT
Start: 2019-09-27 | End: 2019-09-27

## 2019-09-27 RX ADMIN — KETOROLAC TROMETHAMINE 30 MG: 30 INJECTION, SOLUTION INTRAMUSCULAR; INTRAVENOUS at 12:16

## 2019-09-27 RX ADMIN — IOHEXOL 100 ML: 350 INJECTION, SOLUTION INTRAVENOUS at 12:12

## 2019-09-27 RX ADMIN — LIDOCAINE HYDROCHLORIDE 5 ML: 10 INJECTION, SOLUTION EPIDURAL; INFILTRATION; INTRACAUDAL; PERINEURAL at 12:16

## 2019-09-27 NOTE — ED PROVIDER NOTES
History  Chief Complaint   Patient presents with    Fall     via  pt  fell off a ladder - laceration to chin and lower lip - pain to r  wrist      Bruce Shay is a 48 y o  female who presented to the ED after falling from a ladder on the 2nd floor  Patient reports falling on her face which could explain the laceration and bleeding on her chin and lower lip  Patient also reports right to wrist pain and swelling  She also endorses right-sided chest pain  She denies abdominal pain      History provided by:  Patient and relative  Fall   Mechanism of injury: fall    Injury location:  Mouth and hand  Mouth injury location:  Upper outer lip  Hand injury location:  R wrist  Incident location:  Home  Arrived directly from scene: yes    Prior to arrival data:     Bystander interventions:  None    Blood loss:  Minimal    Responsiveness at scene:  Alert    Orientation at scene:  Person, place, situation and time    Loss of consciousness: no      Airway interventions:  None    Breathing interventions:  None    IV access status:  Established    IO access:  None    Cardiac interventions:  None    Immobilization:  None    Airway condition since incident:  Stable    Breathing condition since incident:  Stable    Circulation condition since incident:  Stable    Mental status condition since incident:  Stable  Associated symptoms: chest pain    Associated symptoms: no abdominal pain, no headaches, no nausea and no neck pain        Prior to Admission Medications   Prescriptions Last Dose Informant Patient Reported? Taking?   gabapentin (NEURONTIN) 100 mg capsule   No No   Sig: Take 1 capsule (100 mg total) by mouth daily at bedtime      Facility-Administered Medications: None       Past Medical History:   Diagnosis Date    Asthma     Hypertension        Past Surgical History:   Procedure Laterality Date     SECTION      CHOLECYSTECTOMY         History reviewed  No pertinent family history    I have reviewed and agree with the history as documented  Social History     Tobacco Use    Smoking status: Current Every Day Smoker     Packs/day: 0 50     Types: Cigarettes    Smokeless tobacco: Never Used   Substance Use Topics    Alcohol use: No    Drug use: Not Currently        Review of Systems   Constitutional: Negative for chills and fever  HENT: Negative for rhinorrhea, sore throat and trouble swallowing  Eyes: Negative for pain  Respiratory: Negative for cough, shortness of breath, wheezing and stridor  Cardiovascular: Positive for chest pain  Negative for leg swelling  Gastrointestinal: Negative for abdominal distention, abdominal pain, diarrhea and nausea  Endocrine: Negative for polyuria  Genitourinary: Negative for dysuria, flank pain and urgency  Vaginal pain: right wrist swelling  Musculoskeletal: Positive for joint swelling  Negative for myalgias, neck pain and neck stiffness  Skin: Negative for rash  Chin laceration   Allergic/Immunologic: Negative for immunocompromised state  Neurological: Negative for dizziness, syncope, facial asymmetry, weakness, numbness and headaches  Psychiatric/Behavioral: Negative for confusion and suicidal ideas  All other systems reviewed and are negative  Physical Exam  ED Triage Vitals   Temperature Pulse Respirations Blood Pressure SpO2   09/27/19 1017 09/27/19 1017 09/27/19 1017 09/27/19 1017 09/27/19 1017   (!) 96 4 °F (35 8 °C) 81 16 157/100 96 %      Temp Source Heart Rate Source Patient Position - Orthostatic VS BP Location FiO2 (%)   09/27/19 1017 09/27/19 1017 09/27/19 1017 09/27/19 1017 --   Tympanic Monitor Sitting Left arm       Pain Score       09/27/19 1216       Worst Possible Pain             Orthostatic Vital Signs  Vitals:    09/27/19 1017 09/27/19 1439   BP: 157/100 160/98   Pulse: 81 82   Patient Position - Orthostatic VS: Sitting Sitting       Physical Exam   Constitutional: She is oriented to person, place, and time  She appears well-developed and well-nourished  HENT:   Head: Normocephalic  Laceration to lower chin and lower lip  Eyes: Pupils are equal, round, and reactive to light  EOM are normal    Neck: Normal range of motion  Neck supple  No C-spine tenderness   Cardiovascular: Normal rate and regular rhythm  Exam reveals no friction rub  No murmur heard  Pulmonary/Chest: Effort normal and breath sounds normal  No respiratory distress  She has no wheezes  She has no rales  She exhibits tenderness  Abdominal: Soft  Bowel sounds are normal  She exhibits no distension  There is no tenderness  Musculoskeletal: Normal range of motion  She exhibits tenderness  She exhibits no edema  Right wrist: She exhibits tenderness and swelling  She exhibits no laceration  Arms:  Right distal radius swelling and tenderness to palpation neurovascularly intact  Neurological: She is alert and oriented to person, place, and time  Skin: Skin is warm and dry  Laceration (chin and lower lip laceration ) noted  No rash noted  Nursing note and vitals reviewed        ED Medications  Medications   lidocaine (PF) (XYLOCAINE-MPF) 1 % injection 5 mL (5 mL Infiltration Given 9/27/19 1216)   ketorolac (TORADOL) injection 30 mg (30 mg Intravenous Given 9/27/19 1216)   iohexol (OMNIPAQUE) 350 MG/ML injection (MULTI-DOSE) 100 mL (100 mL Intravenous Given 9/27/19 1212)       Diagnostic Studies  Results Reviewed     Procedure Component Value Units Date/Time    Basic metabolic panel [[id number]]  (Abnormal) Collected:  09/27/19 1057    Lab Status:  Final result Specimen:  Blood from Arm, Left Updated:  09/27/19 1122     Sodium 140 mmol/L      Potassium 4 5 mmol/L      Chloride 100 mmol/L      CO2 35 mmol/L      ANION GAP 5 mmol/L      BUN 17 mg/dL      Creatinine 0 63 mg/dL      Glucose 126 mg/dL      Calcium 9 6 mg/dL      eGFR 118 ml/min/1 73sq m     Narrative:       [place] guidelines for Chronic Kidney Disease (CKD):     Stage 1 with normal or high GFR (GFR > 90 mL/min/1 73 square meters)    Stage 2 Mild CKD (GFR = 60-89 mL/min/1 73 square meters)    Stage 3A Moderate CKD (GFR = 45-59 mL/min/1 73 square meters)    Stage 3B Moderate CKD (GFR = 30-44 mL/min/1 73 square meters)    Stage 4 Severe CKD (GFR = 15-29 mL/min/1 73 square meters)    Stage 5 End Stage CKD (GFR <15 mL/min/1 73 square meters)  Note: GFR calculation is accurate only with a steady state creatinine    CBC and differential [[id number]]  (Abnormal) Collected:  09/27/19 1057    Lab Status:  Final result Specimen:  Blood from Arm, Left Updated:  09/27/19 1121     WBC 6 60 Thousand/uL      RBC 5 56 Million/uL      Hemoglobin 15 0 g/dL      Hematocrit 45 4 %      MCV 82 fL      MCH 27 0 pg      MCHC 33 1 g/dL      RDW 14 0 %      MPV 8 2 fL      Platelets 459 Thousands/uL      Neutrophils Relative 74 %      Lymphocytes Relative 18 %      Monocytes Relative 7 %      Eosinophils Relative 1 %      Basophils Relative 1 %      Neutrophils Absolute 4 80 Thousands/µL      Lymphocytes Absolute 1 20 Thousands/µL      Monocytes Absolute 0 50 Thousand/µL      Eosinophils Absolute 0 00 Thousand/µL      Basophils Absolute 0 10 Thousands/µL                  XR wrist 3+ views RIGHT   Final Result by Arcelia Chang MD (09/27 1416)      Comminuted, nondisplaced, intra-articular distal right radial fracture  The study was marked in [facility] for immediate notification  Workstation performed: [id number]         TRAUMA - CT head wo contrast   Final Result by Juan Jose Medellin MD (09/27 1243)      No acute intracranial abnormality  Workstation performed: [id number]         TRAUMA - CT spine cervical wo contrast   Final Result by Juan Jose Medellin MD (09/27 1257)      No evidence of cervical spine fracture  Straightening of the lordosis and rotation of C1 is most likely related to spasm        Degenerative spondylosis most pronounced at C5-C6  Workstation performed: [id number]         TRAUMA - CT facial bones wo contrast   Final Result by Yves Kenney MD (09/27 0500)   Lucency surrounding the roots of multiple teeth suggests chronic inflammatory dental disease  There is displacement of a frontal incisor to the right of midline with some slight fragmentation which may be posttraumatic  This could also be a chronic finding and should be correlated clinically as to any focal pain or tooth loosening  Somewhat limited study due to motion with motion artifact demonstrated at the level of the mandible  Should symptoms persist follow-up study could be performed         Immediate finding was noted on the Epic system  Workstation performed: [id number]         TRAUMA - CT chest abdomen pelvis w contrast   Final Result by  (09/28 5704)   Addendum 1 of 1 by Yaz Cartagena MD (09/27 7819)   ADDENDUM: The jose hepatis lymph nodes are stable since the previous    study from November 8, 2013            Final            Procedures  Laceration repair  Date/Time: 9/27/2019 11:30 AM  Performed by: Ashwin Felix MD  Authorized by: Magan Wilson DO   Consent: Verbal consent obtained  Risks and benefits: risks, benefits and alternatives were discussed  Consent given by: patient  Patient identity confirmed: verbally with patient  Location: chin and lower lip  Foreign bodies: no foreign bodies  Tendon involvement: none  Nerve involvement: none  Vascular damage: no    Anesthesia:  Local Anesthetic: lidocaine 1% without epinephrine    Sedation:  Patient sedated: no        Procedure Details:  Preparation: Patient was prepped and draped in the usual sterile fashion  Skin closure: 6-0 nylon  Number of sutures: 1 suture on lower lip  3 suture on chin    Technique: simple  Approximation: close  Approximation difficulty: simple  Patient tolerance: Patient tolerated the procedure well with no immediate complications    ECG 12 Lead Documentation Only  Date/Time: 9/27/2019 11:46 AM  Performed by: Danae Chaudhry MD  Authorized by: Danae Chaudhry MD     Indications / Diagnosis:  Chest pain  ECG reviewed by me, the ED Provider: yes    Patient location:  ED  Previous ECG:     Previous ECG:  Unavailable  Interpretation:     Interpretation: normal    Rate:     ECG rate assessment: normal    Rhythm:     Rhythm: sinus rhythm    Ectopy:     Ectopy: none    QRS:     QRS axis:  Normal  Conduction:     Conduction: normal    ST segments:     ST segments:  Normal  Splint application  Date/Time: 9/27/2019 10:20 AM  Performed by: Herberth Thakur DO  Authorized by: Herberth Thakur DO     Patient location:  Bedside and ED  Procedure performed by emergency physician: Yes    Consent:     Consent obtained:  Verbal    Consent given by:  Patient    Risks discussed:  Discoloration, numbness, pain and swelling    Alternatives discussed:  No treatment  Universal protocol:     Procedure explained and questions answered to patient or proxy's satisfaction: yes      Relevant documents present and verified: yes      Test results available and properly labeled: yes      Radiology Images displayed and confirmed  If images not available, report reviewed: yes      Required blood products, implants, devices, and special equipment available: yes      Site/side marked: yes      Immediately prior to procedure a time out was called: yes      Patient identity confirmed:  Arm band and verbally with patient  Indication:     Indications: fracture    Pre-procedure details:     Sensation:  Normal  Procedure details:     Laterality:  Right    Location:  Wrist    Wrist:  R wrist    Splint type:  Volar short arm    Supplies:  Ortho-Glass  Post-procedure details:     Pain:  Improved    Sensation:  Normal    Neurovascular Exam: skin pink, capillary refill <2 sec, normal pulses and skin intact, warm, and dry      Patient tolerance of procedure:   Tolerated well, no immediate complications            ED Course  ED Course as of Sep 28 0718   Fri Sep 27, 2019   1333 No solid visceral injury seen  Fracture of the anterior aspect of the right 2nd , 3rd and 4th rib  No pneumothorax seen  Focal density seen in the right middle lobe area and lingular region suggests atelectasis/scarring  Incidental left lower lung nodule measuring 4 mm in image 34 series 2  Additional left lower lung nodule measuring about 6 1 mm, seen in image 41 series 2      1333 These findings below were discussed with the patient at the bedside      1346 Noted dental fracture  56 Spoke with the dental resident, will come see the pt ion the ED                                     MDM  Number of Diagnoses or Management Options  Fall, initial encounter: new and requires workup  Rib fracture: new and requires workup  Diagnosis management comments: Lilia Orozco is a 48 y o  female who was brought in by EMS after a fall from a ladder  On primary survey, airway, breathing and circulation were intact  Patient was found to have a laceration in her lower lip and her chin, and tenderness and swelling of her right wrist    Chin laceration was repaired with 3 stitches, lower lip laceration repaired with 1 suture  Full trauma workup was done:  A CT face chest abdomen pelvis as well as x-ray of right arm  CT spine shows No evidence of cervical spine fracture  Straightening of the lordosis and rotation of C1 is most likely related to spasm  And degenerative spondylosis most pronounced at C5-C6  CT head shows no acute intracranial abnormality  CT abdomen and pelvis: Fracture of the anterior aspect of the right 2nd , 3rd and 4th rib will be treating it with pain medication  He is prescribed which naproxen 500 mg b i d  For 5 days    CT face shows  Lucency surrounding the roots of multiple teeth suggests chronic inflammatory dental disease and a displacement of a frontal incisor to the right of midline with some slight fragmentation which may be posttraumatic  Dental resident was paged  Dental resident Dr Amina Cruz came to ED and evaluated patient, for x-ray of patient's teeth and for further evaluation  Patient was made aware of this plan and she agrees with plan  Right after the dental resident evaluation, patient wanted to leave, she was asked to wait a little longer for further evaluation however patient did not want wait  She was discharged with pain medication and strict return to ED instructions  Discussed with the patient CT scan results including the noted nodules that need follow-up as an outpatient in 6 months for possible evaluation of cancer in further evaluation of tumor  Plan will be for outpatient management and follow-up given strict instructions when to return back to the emergency department  Pt re-examined and evaluated after testing and treatment  Spoke with the patient and feeling improved and sxs have resolved  Will discharge home with close f/u with pcp and instructed to return to the ED if sxs worsen or continue  Pt agrees with the plan for discharge and feels comfortable to go home with proper f/u  Advised to return for worsening or additional problems  Diagnostic tests were reviewed and questions answered  Diagnosis, care plan and treatment options were discussed  The patient understand instructions and will follow up as directed  Counseling: I had a detailed discussion with the patient and/or guardian regarding: the historical points, exam findings, and any diagnostic results supporting the discharge diagnosis, lab results, radiology results, discharge instructions reviewed with patient and/or family/caregiver and understanding was verbalized  Instructions given to return to the emergency department if symptoms worsen or persist, or if there are any questions or concerns that arise at home            Amount and/or Complexity of Data Reviewed  Clinical lab tests: ordered and reviewed  Tests in the radiology section of CPT®: ordered and reviewed  Review and summarize past medical records: yes  Independent visualization of images, tracings, or specimens: yes (All labs reviewed and utilized in the medical decision making process    All radiology studies independently viewed by me and interpreted by the radiologist )        Disposition  Final diagnoses:   Fall, initial encounter   Rib fracture     Time reflects when diagnosis was documented in both MDM as applicable and the Disposition within this note     Time User Action Codes Description Comment    9/27/2019  2:43 PM Kelle Meadows Add Sacatarino Inmaner  XDWI] Fall, initial encounter     9/27/2019  2:46 PM Kelle Meadows Add [S22 39XA] Rib fracture       ED Disposition     ED Disposition Condition Date/Time Comment    Discharge Fair Fri Sep 27, 2019  2:57 PM         Follow-up Information     Follow up With Specialties Details Why [address] [place] [address] Emergency Department Emergency Medicine Go to  If symptoms worsen [address] [facility] [id number] [phone]    dentist  Go in 3 days for dental evaluation Ask for Dr Birgit Watkins          Discharge Medication List as of 9/27/2019  2:45 PM      CONTINUE these medications which have NOT CHANGED    Details   gabapentin (NEURONTIN) 100 mg capsule Take 1 capsule (100 mg total) by mouth daily at bedtime, Starting Fri 9/6/2019, Normal           No discharge procedures on file  ED Provider  Attending physically available and evaluated Jhonny Courtney  I managed the patient along with the ED Attending      Electronically Signed by         Aby Kenney MD  09/27/19 6172 [facility],   09/28/19 5885

## 2019-09-28 LAB
ATRIAL RATE: 62 BPM
P AXIS: 72 DEGREES
PR INTERVAL: 166 MS
QRS AXIS: 43 DEGREES
QRSD INTERVAL: 76 MS
QT INTERVAL: 430 MS
QTC INTERVAL: 436 MS
T WAVE AXIS: 44 DEGREES
VENTRICULAR RATE: 62 BPM

## 2019-09-28 PROCEDURE — 93010 ELECTROCARDIOGRAM REPORT: CPT | Performed by: INTERNAL MEDICINE

## 2020-04-04 ENCOUNTER — APPOINTMENT (INPATIENT)
Dept: RADIOLOGY | Facility: HOSPITAL | Age: 55
DRG: 816 | End: 2020-04-04
Payer: COMMERCIAL

## 2020-04-04 ENCOUNTER — HOSPITAL ENCOUNTER (INPATIENT)
Facility: HOSPITAL | Age: 55
LOS: 3 days | Discharge: HOME | DRG: 816 | End: 2020-04-07
Attending: RADIOLOGY | Admitting: FAMILY MEDICINE
Payer: COMMERCIAL

## 2020-04-04 ENCOUNTER — APPOINTMENT (EMERGENCY)
Dept: CT IMAGING | Facility: HOSPITAL | Age: 55
DRG: 816 | End: 2020-04-04
Payer: COMMERCIAL

## 2020-04-04 DIAGNOSIS — G93.41 ACUTE METABOLIC ENCEPHALOPATHY: ICD-10-CM

## 2020-04-04 DIAGNOSIS — K59.03 DRUG-INDUCED CONSTIPATION: ICD-10-CM

## 2020-04-04 DIAGNOSIS — I10 ESSENTIAL HYPERTENSION: ICD-10-CM

## 2020-04-04 DIAGNOSIS — T50.904A DRUG OVERDOSE, UNDETERMINED INTENT, INITIAL ENCOUNTER: ICD-10-CM

## 2020-04-04 DIAGNOSIS — Z86.19 HISTORY OF HEPATITIS C: ICD-10-CM

## 2020-04-04 DIAGNOSIS — R74.01 TRANSAMINITIS: ICD-10-CM

## 2020-04-04 DIAGNOSIS — F19.10 SUBSTANCE ABUSE (HCC): Primary | ICD-10-CM

## 2020-04-04 DIAGNOSIS — F32.9 MAJOR DEPRESSIVE DISORDER WITH CURRENT ACTIVE EPISODE, UNSPECIFIED DEPRESSION EPISODE SEVERITY, UNSPECIFIED WHETHER RECURRENT: ICD-10-CM

## 2020-04-04 DIAGNOSIS — R41.82 ALTERED MENTAL STATUS, UNSPECIFIED ALTERED MENTAL STATUS TYPE: ICD-10-CM

## 2020-04-04 PROBLEM — G93.40 ACUTE ENCEPHALOPATHY: Status: ACTIVE | Noted: 2020-04-04

## 2020-04-04 PROBLEM — I16.0 HYPERTENSIVE URGENCY: Status: ACTIVE | Noted: 2020-04-04

## 2020-04-04 PROBLEM — T50.901A DRUG OVERDOSE: Status: ACTIVE | Noted: 2020-04-04

## 2020-04-04 LAB
ALBUMIN SERPL BCP-MCNC: 4.6 G/DL (ref 3–5.2)
ALP SERPL-CCNC: 168 U/L (ref 43–122)
ALT SERPL W P-5'-P-CCNC: 94 U/L (ref 9–52)
AMMONIA PLAS-SCNC: 16 UMOL/L (ref 9–33)
AMORPH PHOS CRY URNS QL MICRO: ABNORMAL /HPF
AMPHETAMINES SERPL QL SCN: NEGATIVE
ANION GAP SERPL CALCULATED.3IONS-SCNC: 11 MMOL/L (ref 5–14)
APAP SERPL-MCNC: <10 UG/ML (ref 10–20)
AST SERPL W P-5'-P-CCNC: 89 U/L (ref 14–36)
BACTERIA UR QL AUTO: ABNORMAL /HPF
BARBITURATES UR QL: NEGATIVE
BASE EXCESS BLDA CALC-SCNC: 5.6 MMOL/L (ref -2.1–2.1)
BASOPHILS # BLD AUTO: 0 THOUSANDS/ΜL (ref 0–0.1)
BASOPHILS NFR BLD AUTO: 1 % (ref 0–1)
BENZODIAZ UR QL: NEGATIVE
BILIRUB SERPL-MCNC: 0.6 MG/DL
BILIRUB UR QL STRIP: NEGATIVE
BUN SERPL-MCNC: 15 MG/DL (ref 5–25)
CALCIUM SERPL-MCNC: 10 MG/DL (ref 8.4–10.2)
CHLORIDE SERPL-SCNC: 98 MMOL/L (ref 97–108)
CLARITY UR: ABNORMAL
CO2 SERPL-SCNC: 32 MMOL/L (ref 22–30)
COCAINE UR QL: POSITIVE
COLOR UR: YELLOW
CREAT SERPL-MCNC: 0.61 MG/DL (ref 0.6–1.2)
EOSINOPHIL # BLD AUTO: 0.1 THOUSAND/ΜL (ref 0–0.4)
EOSINOPHIL NFR BLD AUTO: 2 % (ref 0–6)
ERYTHROCYTE [DISTWIDTH] IN BLOOD BY AUTOMATED COUNT: 13.7 %
ETHANOL SERPL-MCNC: <10 MG/DL (ref 0–10)
GFR SERPL CREATININE-BSD FRML MDRD: 119 ML/MIN/1.73SQ M
GLUCOSE SERPL-MCNC: 164 MG/DL (ref 70–99)
GLUCOSE SERPL-MCNC: 184 MG/DL (ref 65–140)
GLUCOSE UR STRIP-MCNC: NEGATIVE MG/DL
HCO3 BLDA-SCNC: 28.9 MMOL/L (ref 22–26)
HCT VFR BLD AUTO: 50.1 % (ref 36–46)
HGB BLD-MCNC: 16.3 G/DL (ref 12–16)
HGB UR QL STRIP.AUTO: 10
KETONES UR STRIP-MCNC: NEGATIVE MG/DL
LEUKOCYTE ESTERASE UR QL STRIP: NEGATIVE
LIPASE SERPL-CCNC: 414 U/L (ref 23–300)
LYMPHOCYTES # BLD AUTO: 2 THOUSANDS/ΜL (ref 0.5–4)
LYMPHOCYTES NFR BLD AUTO: 43 % (ref 25–45)
MCH RBC QN AUTO: 26.7 PG (ref 26–34)
MCHC RBC AUTO-ENTMCNC: 32.5 G/DL (ref 31–36)
MCV RBC AUTO: 82 FL (ref 80–100)
METHADONE UR QL: POSITIVE
MONOCYTES # BLD AUTO: 0.5 THOUSAND/ΜL (ref 0.2–0.9)
MONOCYTES NFR BLD AUTO: 11 % (ref 1–10)
NEUTROPHILS # BLD AUTO: 2 THOUSANDS/ΜL (ref 1.8–7.8)
NEUTS SEG NFR BLD AUTO: 43 % (ref 45–65)
NITRITE UR QL STRIP: NEGATIVE
NON-SQ EPI CELLS URNS QL MICRO: ABNORMAL /HPF
O2 CT BLDA-SCNC: 21.2 ML/DL (ref 16–23)
OPIATES UR QL SCN: POSITIVE
OXYHGB MFR BLDA: 91.3 % (ref 95–98)
PCO2 BLDA: 37 MM HG (ref 35–45)
PCP UR QL: NEGATIVE
PH BLDA: 7.5 [PH] (ref 7.35–7.45)
PH UR STRIP.AUTO: 8 [PH]
PLATELET # BLD AUTO: 172 THOUSANDS/UL (ref 150–450)
PLATELET BLD QL SMEAR: ADEQUATE
PMV BLD AUTO: 8 FL (ref 8.9–12.7)
PO2 BLDA: 82 MM HG (ref 80–105)
POTASSIUM SERPL-SCNC: 4.1 MMOL/L (ref 3.6–5)
PROT SERPL-MCNC: 10.1 G/DL (ref 5.9–8.4)
PROT UR STRIP-MCNC: ABNORMAL MG/DL
RBC # BLD AUTO: 6.09 MILLION/UL (ref 4–5.2)
RBC #/AREA URNS AUTO: ABNORMAL /HPF
RBC MORPH BLD: NORMAL
SALICYLATES SERPL-MCNC: <1 MG/DL (ref 10–30)
SODIUM SERPL-SCNC: 141 MMOL/L (ref 137–147)
SP GR UR STRIP.AUTO: 1.02 (ref 1–1.04)
SPECIMEN SOURCE: ABNORMAL
THC UR QL: NEGATIVE
TROPONIN I SERPL-MCNC: <0.01 NG/ML (ref 0–0.03)
UROBILINOGEN UA: 1 MG/DL
WBC # BLD AUTO: 4.6 THOUSAND/UL (ref 4.5–11)
WBC #/AREA URNS AUTO: ABNORMAL /HPF

## 2020-04-04 PROCEDURE — 70450 CT HEAD/BRAIN W/O DYE: CPT

## 2020-04-04 PROCEDURE — 82805 BLOOD GASES W/O2 SATURATION: CPT | Performed by: EMERGENCY MEDICINE

## 2020-04-04 PROCEDURE — 96361 HYDRATE IV INFUSION ADD-ON: CPT

## 2020-04-04 PROCEDURE — 80320 DRUG SCREEN QUANTALCOHOLS: CPT | Performed by: PHYSICIAN ASSISTANT

## 2020-04-04 PROCEDURE — 93005 ELECTROCARDIOGRAM TRACING: CPT

## 2020-04-04 PROCEDURE — 81001 URINALYSIS AUTO W/SCOPE: CPT | Performed by: PHYSICIAN ASSISTANT

## 2020-04-04 PROCEDURE — 96372 THER/PROPH/DIAG INJ SC/IM: CPT

## 2020-04-04 PROCEDURE — 99223 1ST HOSP IP/OBS HIGH 75: CPT | Performed by: FAMILY MEDICINE

## 2020-04-04 PROCEDURE — 71045 X-RAY EXAM CHEST 1 VIEW: CPT

## 2020-04-04 PROCEDURE — 99284 EMERGENCY DEPT VISIT MOD MDM: CPT | Performed by: PHYSICIAN ASSISTANT

## 2020-04-04 PROCEDURE — 82140 ASSAY OF AMMONIA: CPT | Performed by: EMERGENCY MEDICINE

## 2020-04-04 PROCEDURE — G0426 INPT/ED TELECONSULT50: HCPCS | Performed by: EMERGENCY MEDICINE

## 2020-04-04 PROCEDURE — 80053 COMPREHEN METABOLIC PANEL: CPT | Performed by: PHYSICIAN ASSISTANT

## 2020-04-04 PROCEDURE — 80307 DRUG TEST PRSMV CHEM ANLYZR: CPT | Performed by: PHYSICIAN ASSISTANT

## 2020-04-04 PROCEDURE — 36415 COLL VENOUS BLD VENIPUNCTURE: CPT | Performed by: PHYSICIAN ASSISTANT

## 2020-04-04 PROCEDURE — 96374 THER/PROPH/DIAG INJ IV PUSH: CPT

## 2020-04-04 PROCEDURE — 84484 ASSAY OF TROPONIN QUANT: CPT | Performed by: PHYSICIAN ASSISTANT

## 2020-04-04 PROCEDURE — 80329 ANALGESICS NON-OPIOID 1 OR 2: CPT | Performed by: FAMILY MEDICINE

## 2020-04-04 PROCEDURE — 83690 ASSAY OF LIPASE: CPT | Performed by: PHYSICIAN ASSISTANT

## 2020-04-04 PROCEDURE — 72125 CT NECK SPINE W/O DYE: CPT

## 2020-04-04 PROCEDURE — 85025 COMPLETE CBC W/AUTO DIFF WBC: CPT | Performed by: PHYSICIAN ASSISTANT

## 2020-04-04 PROCEDURE — 80329 ANALGESICS NON-OPIOID 1 OR 2: CPT | Performed by: EMERGENCY MEDICINE

## 2020-04-04 PROCEDURE — 81003 URINALYSIS AUTO W/O SCOPE: CPT | Performed by: PHYSICIAN ASSISTANT

## 2020-04-04 PROCEDURE — 99285 EMERGENCY DEPT VISIT HI MDM: CPT

## 2020-04-04 PROCEDURE — 82948 REAGENT STRIP/BLOOD GLUCOSE: CPT

## 2020-04-04 RX ORDER — NALOXONE HYDROCHLORIDE 1 MG/ML
2 INJECTION INTRAMUSCULAR; INTRAVENOUS; SUBCUTANEOUS ONCE
Status: COMPLETED | OUTPATIENT
Start: 2020-04-04 | End: 2020-04-04

## 2020-04-04 RX ORDER — HYDRALAZINE HYDROCHLORIDE 20 MG/ML
10 INJECTION INTRAMUSCULAR; INTRAVENOUS EVERY 6 HOURS PRN
Status: DISCONTINUED | OUTPATIENT
Start: 2020-04-04 | End: 2020-04-05

## 2020-04-04 RX ORDER — SODIUM CHLORIDE 9 MG/ML
100 INJECTION, SOLUTION INTRAVENOUS CONTINUOUS
Status: DISCONTINUED | OUTPATIENT
Start: 2020-04-04 | End: 2020-04-05

## 2020-04-04 RX ORDER — PROMETHAZINE HYDROCHLORIDE 25 MG/ML
25 INJECTION, SOLUTION INTRAMUSCULAR; INTRAVENOUS EVERY 4 HOURS PRN
Status: DISCONTINUED | OUTPATIENT
Start: 2020-04-04 | End: 2020-04-04

## 2020-04-04 RX ORDER — HYDRALAZINE HYDROCHLORIDE 20 MG/ML
5 INJECTION INTRAMUSCULAR; INTRAVENOUS EVERY 6 HOURS PRN
Status: DISCONTINUED | OUTPATIENT
Start: 2020-04-04 | End: 2020-04-04

## 2020-04-04 RX ORDER — SODIUM CHLORIDE 9 MG/ML
250 INJECTION, SOLUTION INTRAVENOUS CONTINUOUS
Status: DISCONTINUED | OUTPATIENT
Start: 2020-04-04 | End: 2020-04-04

## 2020-04-04 RX ORDER — HYDRALAZINE HYDROCHLORIDE 20 MG/ML
5 INJECTION INTRAMUSCULAR; INTRAVENOUS ONCE
Status: COMPLETED | OUTPATIENT
Start: 2020-04-04 | End: 2020-04-04

## 2020-04-04 RX ORDER — ONDANSETRON 2 MG/ML
4 INJECTION INTRAMUSCULAR; INTRAVENOUS ONCE
Status: COMPLETED | OUTPATIENT
Start: 2020-04-04 | End: 2020-04-04

## 2020-04-04 RX ADMIN — ONDANSETRON HYDROCHLORIDE 4 MG: 2 INJECTION, SOLUTION INTRAMUSCULAR; INTRAVENOUS at 12:37

## 2020-04-04 RX ADMIN — NALOXONE HYDROCHLORIDE 2 MG: 1 INJECTION PARENTERAL at 10:58

## 2020-04-04 RX ADMIN — ACETYLCYSTEINE 12040 MG: 6 INJECTION, SOLUTION INTRAVENOUS at 21:30

## 2020-04-04 RX ADMIN — SODIUM CHLORIDE 100 ML/HR: 0.9 INJECTION, SOLUTION INTRAVENOUS at 17:27

## 2020-04-04 RX ADMIN — ONDANSETRON 4 MG: 2 INJECTION INTRAMUSCULAR; INTRAVENOUS at 23:10

## 2020-04-04 RX ADMIN — HYDRALAZINE HYDROCHLORIDE 5 MG: 20 INJECTION INTRAMUSCULAR; INTRAVENOUS at 23:10

## 2020-04-04 RX ADMIN — ACETYLCYSTEINE 4020 MG: 6 INJECTION, SOLUTION INTRAVENOUS at 22:30

## 2020-04-04 RX ADMIN — HYDRALAZINE HYDROCHLORIDE 5 MG: 20 INJECTION INTRAMUSCULAR; INTRAVENOUS at 21:17

## 2020-04-04 RX ADMIN — SODIUM CHLORIDE 250 ML/HR: 0.9 INJECTION, SOLUTION INTRAVENOUS at 12:37

## 2020-04-05 PROBLEM — K59.03 THERAPEUTIC OPIOID-INDUCED CONSTIPATION (OIC): Status: ACTIVE | Noted: 2020-04-05

## 2020-04-05 PROBLEM — E87.6 HYPOKALEMIA: Status: ACTIVE | Noted: 2020-04-05

## 2020-04-05 PROBLEM — T40.2X5A THERAPEUTIC OPIOID-INDUCED CONSTIPATION (OIC): Status: ACTIVE | Noted: 2020-04-05

## 2020-04-05 LAB
ALBUMIN SERPL BCP-MCNC: 4.2 G/DL (ref 3–5.2)
ALP SERPL-CCNC: 158 U/L (ref 43–122)
ALT SERPL W P-5'-P-CCNC: 80 U/L (ref 9–52)
ANION GAP SERPL CALCULATED.3IONS-SCNC: 11 MMOL/L (ref 5–14)
AST SERPL W P-5'-P-CCNC: 58 U/L (ref 14–36)
ATRIAL RATE: 91 BPM
ATRIAL RATE: 93 BPM
BASOPHILS # BLD AUTO: 0 THOUSANDS/ΜL (ref 0–0.1)
BASOPHILS NFR BLD AUTO: 0 % (ref 0–1)
BILIRUB SERPL-MCNC: 0.7 MG/DL
BUN SERPL-MCNC: 10 MG/DL (ref 5–25)
CALCIUM SERPL-MCNC: 9.2 MG/DL (ref 8.4–10.2)
CHLORIDE SERPL-SCNC: 99 MMOL/L (ref 97–108)
CO2 SERPL-SCNC: 25 MMOL/L (ref 22–30)
CREAT SERPL-MCNC: 0.47 MG/DL (ref 0.6–1.2)
EOSINOPHIL # BLD AUTO: 0 THOUSAND/ΜL (ref 0–0.4)
EOSINOPHIL NFR BLD AUTO: 0 % (ref 0–6)
ERYTHROCYTE [DISTWIDTH] IN BLOOD BY AUTOMATED COUNT: 13.5 %
GFR SERPL CREATININE-BSD FRML MDRD: 130 ML/MIN/1.73SQ M
GLUCOSE SERPL-MCNC: 157 MG/DL (ref 70–99)
HCT VFR BLD AUTO: 47.7 % (ref 36–46)
HGB BLD-MCNC: 15.8 G/DL (ref 12–16)
LIPASE SERPL-CCNC: 143 U/L (ref 23–300)
LYMPHOCYTES # BLD AUTO: 2 THOUSANDS/ΜL (ref 0.5–4)
LYMPHOCYTES NFR BLD AUTO: 24 % (ref 25–45)
MCH RBC QN AUTO: 26.5 PG (ref 26–34)
MCHC RBC AUTO-ENTMCNC: 33 G/DL (ref 31–36)
MCV RBC AUTO: 80 FL (ref 80–100)
MONOCYTES # BLD AUTO: 0.7 THOUSAND/ΜL (ref 0.2–0.9)
MONOCYTES NFR BLD AUTO: 8 % (ref 1–10)
NEUTROPHILS # BLD AUTO: 5.7 THOUSANDS/ΜL (ref 1.8–7.8)
NEUTS SEG NFR BLD AUTO: 68 % (ref 45–65)
P AXIS: 65 DEGREES
P AXIS: 79 DEGREES
PLATELET # BLD AUTO: 190 THOUSANDS/UL (ref 150–450)
PMV BLD AUTO: 8.3 FL (ref 8.9–12.7)
POTASSIUM SERPL-SCNC: 3.5 MMOL/L (ref 3.6–5)
PR INTERVAL: 158 MS
PR INTERVAL: 166 MS
PROT SERPL-MCNC: 9.2 G/DL (ref 5.9–8.4)
QRS AXIS: 11 DEGREES
QRS AXIS: 28 DEGREES
QRSD INTERVAL: 72 MS
QRSD INTERVAL: 76 MS
QT INTERVAL: 362 MS
QT INTERVAL: 386 MS
QTC INTERVAL: 445 MS
QTC INTERVAL: 479 MS
RBC # BLD AUTO: 5.96 MILLION/UL (ref 4–5.2)
SODIUM SERPL-SCNC: 135 MMOL/L (ref 137–147)
T WAVE AXIS: 37 DEGREES
T WAVE AXIS: 47 DEGREES
VENTRICULAR RATE: 91 BPM
VENTRICULAR RATE: 93 BPM
WBC # BLD AUTO: 8.5 THOUSAND/UL (ref 4.5–11)

## 2020-04-05 PROCEDURE — 86803 HEPATITIS C AB TEST: CPT | Performed by: FAMILY MEDICINE

## 2020-04-05 PROCEDURE — 99233 SBSQ HOSP IP/OBS HIGH 50: CPT | Performed by: FAMILY MEDICINE

## 2020-04-05 PROCEDURE — 93010 ELECTROCARDIOGRAM REPORT: CPT | Performed by: INTERNAL MEDICINE

## 2020-04-05 PROCEDURE — 83690 ASSAY OF LIPASE: CPT | Performed by: FAMILY MEDICINE

## 2020-04-05 PROCEDURE — 85025 COMPLETE CBC W/AUTO DIFF WBC: CPT | Performed by: FAMILY MEDICINE

## 2020-04-05 PROCEDURE — 80053 COMPREHEN METABOLIC PANEL: CPT | Performed by: FAMILY MEDICINE

## 2020-04-05 PROCEDURE — 86705 HEP B CORE ANTIBODY IGM: CPT | Performed by: FAMILY MEDICINE

## 2020-04-05 PROCEDURE — 86704 HEP B CORE ANTIBODY TOTAL: CPT | Performed by: FAMILY MEDICINE

## 2020-04-05 PROCEDURE — 93005 ELECTROCARDIOGRAM TRACING: CPT

## 2020-04-05 PROCEDURE — 87340 HEPATITIS B SURFACE AG IA: CPT | Performed by: FAMILY MEDICINE

## 2020-04-05 RX ORDER — ENALAPRILAT 2.5 MG/2ML
1.25 INJECTION INTRAVENOUS ONCE
Status: COMPLETED | OUTPATIENT
Start: 2020-04-05 | End: 2020-04-05

## 2020-04-05 RX ORDER — IBUPROFEN 400 MG/1
400 TABLET ORAL EVERY 6 HOURS PRN
Status: DISCONTINUED | OUTPATIENT
Start: 2020-04-05 | End: 2020-04-07 | Stop reason: HOSPADM

## 2020-04-05 RX ORDER — DOCUSATE SODIUM 100 MG/1
100 CAPSULE, LIQUID FILLED ORAL 2 TIMES DAILY PRN
Status: DISCONTINUED | OUTPATIENT
Start: 2020-04-05 | End: 2020-04-07 | Stop reason: HOSPADM

## 2020-04-05 RX ORDER — HYDROCHLOROTHIAZIDE 25 MG/1
25 TABLET ORAL DAILY
Status: DISCONTINUED | OUTPATIENT
Start: 2020-04-05 | End: 2020-04-07 | Stop reason: HOSPADM

## 2020-04-05 RX ORDER — POTASSIUM CHLORIDE 20 MEQ/1
20 TABLET, EXTENDED RELEASE ORAL ONCE
Status: COMPLETED | OUTPATIENT
Start: 2020-04-05 | End: 2020-04-05

## 2020-04-05 RX ORDER — AMLODIPINE BESYLATE 5 MG/1
5 TABLET ORAL DAILY
Status: DISCONTINUED | OUTPATIENT
Start: 2020-04-05 | End: 2020-04-07 | Stop reason: HOSPADM

## 2020-04-05 RX ORDER — HYDRALAZINE HYDROCHLORIDE 20 MG/ML
10 INJECTION INTRAMUSCULAR; INTRAVENOUS EVERY 4 HOURS PRN
Status: DISCONTINUED | OUTPATIENT
Start: 2020-04-05 | End: 2020-04-05

## 2020-04-05 RX ADMIN — ENALAPRILAT 1.25 MG: 1.25 INJECTION INTRAVENOUS at 07:01

## 2020-04-05 RX ADMIN — DOCUSATE SODIUM 100 MG: 100 CAPSULE, LIQUID FILLED ORAL at 18:10

## 2020-04-05 RX ADMIN — HYDRALAZINE HYDROCHLORIDE 10 MG: 20 INJECTION INTRAMUSCULAR; INTRAVENOUS at 03:54

## 2020-04-05 RX ADMIN — IBUPROFEN 400 MG: 400 TABLET ORAL at 18:09

## 2020-04-05 RX ADMIN — HYDROCHLOROTHIAZIDE 25 MG: 25 TABLET ORAL at 09:36

## 2020-04-05 RX ADMIN — AMLODIPINE BESYLATE 5 MG: 5 TABLET ORAL at 09:36

## 2020-04-05 RX ADMIN — POTASSIUM CHLORIDE 20 MEQ: 20 TABLET, EXTENDED RELEASE ORAL at 09:36

## 2020-04-05 RX ADMIN — ACETYLCYSTEINE 8020 MG: 6 INJECTION, SOLUTION INTRAVENOUS at 03:15

## 2020-04-06 ENCOUNTER — APPOINTMENT (INPATIENT)
Dept: NON INVASIVE DIAGNOSTICS | Facility: HOSPITAL | Age: 55
DRG: 816 | End: 2020-04-06
Payer: COMMERCIAL

## 2020-04-06 PROBLEM — E87.6 HYPOKALEMIA: Status: RESOLVED | Noted: 2020-04-05 | Resolved: 2020-04-06

## 2020-04-06 PROBLEM — R01.1 MURMUR: Status: ACTIVE | Noted: 2020-04-06

## 2020-04-06 LAB
ALBUMIN SERPL BCP-MCNC: 4.4 G/DL (ref 3–5.2)
ALP SERPL-CCNC: 162 U/L (ref 43–122)
ALT SERPL W P-5'-P-CCNC: 90 U/L (ref 9–52)
ANION GAP SERPL CALCULATED.3IONS-SCNC: 10 MMOL/L (ref 5–14)
AST SERPL W P-5'-P-CCNC: 84 U/L (ref 14–36)
BILIRUB SERPL-MCNC: 1.1 MG/DL
BUN SERPL-MCNC: 17 MG/DL (ref 5–25)
CALCIUM SERPL-MCNC: 9.8 MG/DL (ref 8.4–10.2)
CHLORIDE SERPL-SCNC: 99 MMOL/L (ref 97–108)
CO2 SERPL-SCNC: 26 MMOL/L (ref 22–30)
CREAT SERPL-MCNC: 0.62 MG/DL (ref 0.6–1.2)
GFR SERPL CREATININE-BSD FRML MDRD: 118 ML/MIN/1.73SQ M
GLUCOSE SERPL-MCNC: 117 MG/DL (ref 70–99)
GLUCOSE SERPL-MCNC: 302 MG/DL (ref 65–140)
HBV CORE AB SER QL: ABNORMAL
HBV CORE IGM SER QL: ABNORMAL
HBV SURFACE AG SER QL: ABNORMAL
HCV AB SER QL: ABNORMAL
MAGNESIUM SERPL-MCNC: 1.9 MG/DL (ref 1.6–2.3)
POTASSIUM SERPL-SCNC: 4.3 MMOL/L (ref 3.6–5)
PROT SERPL-MCNC: 9.7 G/DL (ref 5.9–8.4)
SODIUM SERPL-SCNC: 135 MMOL/L (ref 137–147)

## 2020-04-06 PROCEDURE — 80053 COMPREHEN METABOLIC PANEL: CPT | Performed by: FAMILY MEDICINE

## 2020-04-06 PROCEDURE — 99232 SBSQ HOSP IP/OBS MODERATE 35: CPT | Performed by: FAMILY MEDICINE

## 2020-04-06 PROCEDURE — 93306 TTE W/DOPPLER COMPLETE: CPT

## 2020-04-06 PROCEDURE — 99222 1ST HOSP IP/OBS MODERATE 55: CPT | Performed by: PSYCHIATRY & NEUROLOGY

## 2020-04-06 PROCEDURE — 93306 TTE W/DOPPLER COMPLETE: CPT | Performed by: INTERNAL MEDICINE

## 2020-04-06 PROCEDURE — 83735 ASSAY OF MAGNESIUM: CPT | Performed by: FAMILY MEDICINE

## 2020-04-06 PROCEDURE — 82948 REAGENT STRIP/BLOOD GLUCOSE: CPT

## 2020-04-06 RX ORDER — POLYETHYLENE GLYCOL 3350 17 G/17G
17 POWDER, FOR SOLUTION ORAL ONCE
Status: COMPLETED | OUTPATIENT
Start: 2020-04-06 | End: 2020-04-06

## 2020-04-06 RX ORDER — METHADONE HYDROCHLORIDE 10 MG/ML
90 CONCENTRATE ORAL DAILY
Status: DISCONTINUED | OUTPATIENT
Start: 2020-04-06 | End: 2020-04-07 | Stop reason: HOSPADM

## 2020-04-06 RX ADMIN — AMLODIPINE BESYLATE 5 MG: 5 TABLET ORAL at 07:55

## 2020-04-06 RX ADMIN — DOCUSATE SODIUM 100 MG: 100 CAPSULE, LIQUID FILLED ORAL at 07:56

## 2020-04-06 RX ADMIN — HYDROCHLOROTHIAZIDE 25 MG: 25 TABLET ORAL at 07:55

## 2020-04-06 RX ADMIN — METHADONE HYDROCHLORIDE 90 MG: 10 CONCENTRATE ORAL at 09:50

## 2020-04-06 RX ADMIN — POLYETHYLENE GLYCOL 3350 17 G: 17 POWDER, FOR SOLUTION ORAL at 09:33

## 2020-04-07 ENCOUNTER — TELEPHONE (OUTPATIENT)
Dept: FAMILY MEDICINE CLINIC | Facility: CLINIC | Age: 55
End: 2020-04-07

## 2020-04-07 VITALS
DIASTOLIC BLOOD PRESSURE: 88 MMHG | OXYGEN SATURATION: 92 % | SYSTOLIC BLOOD PRESSURE: 138 MMHG | HEIGHT: 61 IN | BODY MASS INDEX: 34.13 KG/M2 | HEART RATE: 83 BPM | WEIGHT: 180.78 LBS | RESPIRATION RATE: 20 BRPM | TEMPERATURE: 97.4 F

## 2020-04-07 DIAGNOSIS — Z78.9 NEED FOR FOLLOW-UP BY SOCIAL WORKER: Primary | ICD-10-CM

## 2020-04-07 PROBLEM — I16.0 HYPERTENSIVE URGENCY: Status: RESOLVED | Noted: 2020-04-04 | Resolved: 2020-04-07

## 2020-04-07 LAB
ALBUMIN SERPL BCP-MCNC: 4.2 G/DL (ref 3–5.2)
ALP SERPL-CCNC: 147 U/L (ref 43–122)
ALT SERPL W P-5'-P-CCNC: 110 U/L (ref 9–52)
ANION GAP SERPL CALCULATED.3IONS-SCNC: 9 MMOL/L (ref 5–14)
AST SERPL W P-5'-P-CCNC: 116 U/L (ref 14–36)
BILIRUB SERPL-MCNC: 0.9 MG/DL
BUN SERPL-MCNC: 26 MG/DL (ref 5–25)
CALCIUM SERPL-MCNC: 9.7 MG/DL (ref 8.4–10.2)
CHLORIDE SERPL-SCNC: 98 MMOL/L (ref 97–108)
CO2 SERPL-SCNC: 29 MMOL/L (ref 22–30)
CREAT SERPL-MCNC: 0.73 MG/DL (ref 0.6–1.2)
EST. AVERAGE GLUCOSE BLD GHB EST-MCNC: 128 MG/DL
GFR SERPL CREATININE-BSD FRML MDRD: 108 ML/MIN/1.73SQ M
GLUCOSE SERPL-MCNC: 118 MG/DL (ref 70–99)
HBA1C MFR BLD: 6.1 %
HIV 1+2 AB+HIV1 P24 AG SERPL QL IA: NORMAL
HIV1 P24 AG SER QL: NORMAL
POTASSIUM SERPL-SCNC: 3.9 MMOL/L (ref 3.6–5)
PROT SERPL-MCNC: 9.2 G/DL (ref 5.9–8.4)
SODIUM SERPL-SCNC: 136 MMOL/L (ref 137–147)

## 2020-04-07 PROCEDURE — 83036 HEMOGLOBIN GLYCOSYLATED A1C: CPT | Performed by: FAMILY MEDICINE

## 2020-04-07 PROCEDURE — 80053 COMPREHEN METABOLIC PANEL: CPT | Performed by: FAMILY MEDICINE

## 2020-04-07 PROCEDURE — 99238 HOSP IP/OBS DSCHRG MGMT 30/<: CPT | Performed by: FAMILY MEDICINE

## 2020-04-07 PROCEDURE — 87522 HEPATITIS C REVRS TRNSCRPJ: CPT | Performed by: FAMILY MEDICINE

## 2020-04-07 PROCEDURE — 87806 HIV AG W/HIV1&2 ANTB W/OPTIC: CPT | Performed by: FAMILY MEDICINE

## 2020-04-07 RX ORDER — AMLODIPINE BESYLATE 5 MG/1
5 TABLET ORAL DAILY
Qty: 30 TABLET | Refills: 1 | Status: SHIPPED | OUTPATIENT
Start: 2020-04-08 | End: 2021-01-25 | Stop reason: SDUPTHER

## 2020-04-07 RX ORDER — POLYETHYLENE GLYCOL 3350 17 G/17G
17 POWDER, FOR SOLUTION ORAL DAILY PRN
Qty: 30 EACH | Refills: 1 | Status: SHIPPED | OUTPATIENT
Start: 2020-04-07 | End: 2021-01-25 | Stop reason: SDUPTHER

## 2020-04-07 RX ORDER — DOCUSATE SODIUM 100 MG/1
100 CAPSULE, LIQUID FILLED ORAL 2 TIMES DAILY PRN
Qty: 30 CAPSULE | Refills: 1 | Status: SHIPPED | OUTPATIENT
Start: 2020-04-07 | End: 2021-01-25 | Stop reason: SDUPTHER

## 2020-04-07 RX ORDER — HYDROCHLOROTHIAZIDE 25 MG/1
25 TABLET ORAL DAILY
Qty: 30 TABLET | Refills: 1 | Status: SHIPPED | OUTPATIENT
Start: 2020-04-08 | End: 2021-01-25 | Stop reason: SDUPTHER

## 2020-04-07 RX ADMIN — AMLODIPINE BESYLATE 5 MG: 5 TABLET ORAL at 08:03

## 2020-04-07 RX ADMIN — SERTRALINE HYDROCHLORIDE 50 MG: 50 TABLET ORAL at 08:03

## 2020-04-07 RX ADMIN — HYDROCHLOROTHIAZIDE 25 MG: 25 TABLET ORAL at 08:03

## 2020-04-07 RX ADMIN — METHADONE HYDROCHLORIDE 90 MG: 10 CONCENTRATE ORAL at 08:04

## 2020-04-08 DIAGNOSIS — R73.03 PREDIABETES: Primary | ICD-10-CM

## 2020-04-09 ENCOUNTER — TRANSITIONAL CARE MANAGEMENT (OUTPATIENT)
Dept: FAMILY MEDICINE CLINIC | Facility: CLINIC | Age: 55
End: 2020-04-09

## 2020-04-09 LAB
ATRIAL RATE: 92 BPM
HCV RNA SERPL NAA+PROBE-ACNC: NORMAL IU/ML
HCV RNA SERPL NAA+PROBE-LOG IU: 5.29 LOG10 IU/ML
P AXIS: 72 DEGREES
PR INTERVAL: 160 MS
QRS AXIS: 26 DEGREES
QRSD INTERVAL: 68 MS
QT INTERVAL: 390 MS
QTC INTERVAL: 482 MS
T WAVE AXIS: 40 DEGREES
TEST INFORMATION: NORMAL
VENTRICULAR RATE: 92 BPM

## 2020-04-09 PROCEDURE — 93010 ELECTROCARDIOGRAM REPORT: CPT | Performed by: INTERNAL MEDICINE

## 2020-04-10 ENCOUNTER — TELEPHONE (OUTPATIENT)
Dept: PSYCHIATRY | Facility: CLINIC | Age: 55
End: 2020-04-10

## 2020-04-14 ENCOUNTER — PATIENT OUTREACH (OUTPATIENT)
Dept: FAMILY MEDICINE CLINIC | Facility: CLINIC | Age: 55
End: 2020-04-14

## 2020-04-20 ENCOUNTER — TELEMEDICINE (OUTPATIENT)
Dept: FAMILY MEDICINE CLINIC | Facility: CLINIC | Age: 55
End: 2020-04-20

## 2020-04-20 ENCOUNTER — TELEMEDICINE (OUTPATIENT)
Dept: GASTROENTEROLOGY | Facility: MEDICAL CENTER | Age: 55
End: 2020-04-20
Payer: COMMERCIAL

## 2020-04-20 DIAGNOSIS — Z12.11 SCREENING FOR COLON CANCER: ICD-10-CM

## 2020-04-20 DIAGNOSIS — R12 HEARTBURN: ICD-10-CM

## 2020-04-20 DIAGNOSIS — F19.10 SUBSTANCE ABUSE (HCC): ICD-10-CM

## 2020-04-20 DIAGNOSIS — B19.20 HEPATITIS C VIRUS INFECTION WITHOUT HEPATIC COMA, UNSPECIFIED CHRONICITY: ICD-10-CM

## 2020-04-20 DIAGNOSIS — M54.41 CHRONIC BILATERAL LOW BACK PAIN WITH BILATERAL SCIATICA: Primary | ICD-10-CM

## 2020-04-20 DIAGNOSIS — K59.03 DRUG-INDUCED CONSTIPATION: ICD-10-CM

## 2020-04-20 DIAGNOSIS — G89.29 CHRONIC BILATERAL LOW BACK PAIN WITH BILATERAL SCIATICA: Primary | ICD-10-CM

## 2020-04-20 DIAGNOSIS — M54.42 CHRONIC BILATERAL LOW BACK PAIN WITH BILATERAL SCIATICA: Primary | ICD-10-CM

## 2020-04-20 DIAGNOSIS — Z80.0 FAMILY HISTORY OF ESOPHAGEAL CANCER: ICD-10-CM

## 2020-04-20 DIAGNOSIS — R79.89 ELEVATED LFTS: Primary | ICD-10-CM

## 2020-04-20 PROCEDURE — G2012 BRIEF CHECK IN BY MD/QHP: HCPCS | Performed by: INTERNAL MEDICINE

## 2020-04-20 PROCEDURE — T1015 CLINIC SERVICE: HCPCS | Performed by: FAMILY MEDICINE

## 2020-04-20 PROCEDURE — G2012 BRIEF CHECK IN BY MD/QHP: HCPCS | Performed by: FAMILY MEDICINE

## 2020-06-11 ENCOUNTER — TELEMEDICINE (OUTPATIENT)
Dept: GASTROENTEROLOGY | Facility: MEDICAL CENTER | Age: 55
End: 2020-06-11
Payer: COMMERCIAL

## 2020-06-11 DIAGNOSIS — Z86.19 HISTORY OF HEPATITIS C: ICD-10-CM

## 2020-06-11 DIAGNOSIS — T40.2X5A THERAPEUTIC OPIOID-INDUCED CONSTIPATION (OIC): ICD-10-CM

## 2020-06-11 DIAGNOSIS — R59.1 LYMPHADENOPATHY: ICD-10-CM

## 2020-06-11 DIAGNOSIS — Z80.0 FAMILY HISTORY OF ESOPHAGEAL CANCER: ICD-10-CM

## 2020-06-11 DIAGNOSIS — B19.20 HEPATITIS C VIRUS INFECTION WITHOUT HEPATIC COMA, UNSPECIFIED CHRONICITY: Primary | ICD-10-CM

## 2020-06-11 DIAGNOSIS — F19.10 SUBSTANCE ABUSE (HCC): ICD-10-CM

## 2020-06-11 DIAGNOSIS — K59.03 THERAPEUTIC OPIOID-INDUCED CONSTIPATION (OIC): ICD-10-CM

## 2020-06-11 DIAGNOSIS — R74.01 TRANSAMINITIS: Primary | ICD-10-CM

## 2020-06-11 DIAGNOSIS — K59.09 OTHER CONSTIPATION: ICD-10-CM

## 2020-06-11 PROCEDURE — G2012 BRIEF CHECK IN BY MD/QHP: HCPCS | Performed by: INTERNAL MEDICINE

## 2020-06-15 ENCOUNTER — TELEPHONE (OUTPATIENT)
Dept: GASTROENTEROLOGY | Facility: MEDICAL CENTER | Age: 55
End: 2020-06-15

## 2020-06-19 ENCOUNTER — TELEPHONE (OUTPATIENT)
Dept: GASTROENTEROLOGY | Facility: CLINIC | Age: 55
End: 2020-06-19

## 2020-06-19 ENCOUNTER — TELEPHONE (OUTPATIENT)
Dept: INTERNAL MEDICINE CLINIC | Facility: OTHER | Age: 55
End: 2020-06-19

## 2020-06-19 DIAGNOSIS — Z01.818 PRE-OPERATIVE CLEARANCE: Primary | ICD-10-CM

## 2020-06-19 DIAGNOSIS — Z01.818 PRE-OPERATIVE CLEARANCE: ICD-10-CM

## 2020-06-19 PROCEDURE — U0003 INFECTIOUS AGENT DETECTION BY NUCLEIC ACID (DNA OR RNA); SEVERE ACUTE RESPIRATORY SYNDROME CORONAVIRUS 2 (SARS-COV-2) (CORONAVIRUS DISEASE [COVID-19]), AMPLIFIED PROBE TECHNIQUE, MAKING USE OF HIGH THROUGHPUT TECHNOLOGIES AS DESCRIBED BY CMS-2020-01-R: HCPCS

## 2020-06-20 LAB — SARS-COV-2 RNA SPEC QL NAA+PROBE: NOT DETECTED

## 2020-06-24 ENCOUNTER — HOSPITAL ENCOUNTER (OUTPATIENT)
Dept: GASTROENTEROLOGY | Facility: HOSPITAL | Age: 55
Setting detail: OUTPATIENT SURGERY
Discharge: HOME | End: 2020-06-24
Attending: INTERNAL MEDICINE

## 2020-08-18 ENCOUNTER — APPOINTMENT (OUTPATIENT)
Dept: LAB | Facility: HOSPITAL | Age: 55
End: 2020-08-18
Attending: INTERNAL MEDICINE
Payer: COMMERCIAL

## 2020-08-18 DIAGNOSIS — R79.89 ELEVATED LFTS: ICD-10-CM

## 2020-08-18 DIAGNOSIS — B19.20 HEPATITIS C VIRUS INFECTION WITHOUT HEPATIC COMA, UNSPECIFIED CHRONICITY: ICD-10-CM

## 2020-08-18 LAB
ALBUMIN SERPL BCP-MCNC: 4 G/DL (ref 3–5.2)
ALP SERPL-CCNC: 135 U/L (ref 43–122)
ALT SERPL W P-5'-P-CCNC: 83 U/L (ref 9–52)
ANION GAP SERPL CALCULATED.3IONS-SCNC: 6 MMOL/L (ref 5–14)
AST SERPL W P-5'-P-CCNC: 80 U/L (ref 14–36)
BASOPHILS # BLD AUTO: 0 THOUSANDS/ΜL (ref 0–0.1)
BASOPHILS NFR BLD AUTO: 0 % (ref 0–1)
BILIRUB SERPL-MCNC: 0.4 MG/DL
BUN SERPL-MCNC: 15 MG/DL (ref 5–25)
CALCIUM SERPL-MCNC: 9.5 MG/DL (ref 8.4–10.2)
CHLORIDE SERPL-SCNC: 105 MMOL/L (ref 97–108)
CO2 SERPL-SCNC: 28 MMOL/L (ref 22–30)
CREAT SERPL-MCNC: 0.57 MG/DL (ref 0.6–1.2)
EOSINOPHIL # BLD AUTO: 0.2 THOUSAND/ΜL (ref 0–0.4)
EOSINOPHIL NFR BLD AUTO: 4 % (ref 0–6)
ERYTHROCYTE [DISTWIDTH] IN BLOOD BY AUTOMATED COUNT: 13.2 %
ETHANOL SERPL-MCNC: <10 MG/DL (ref 0–10)
FERRITIN SERPL-MCNC: 316 NG/ML (ref 8–388)
GFR SERPL CREATININE-BSD FRML MDRD: 122 ML/MIN/1.73SQ M
GLUCOSE P FAST SERPL-MCNC: 110 MG/DL (ref 70–99)
HCT VFR BLD AUTO: 44.6 % (ref 36–46)
HGB BLD-MCNC: 14.6 G/DL (ref 12–16)
INR PPP: 0.98 (ref 0.84–1.19)
IRON SERPL-MCNC: 83 UG/DL (ref 50–170)
LYMPHOCYTES # BLD AUTO: 2.1 THOUSANDS/ΜL (ref 0.5–4)
LYMPHOCYTES NFR BLD AUTO: 46 % (ref 25–45)
MCH RBC QN AUTO: 27 PG (ref 26–34)
MCHC RBC AUTO-ENTMCNC: 32.8 G/DL (ref 31–36)
MCV RBC AUTO: 82 FL (ref 80–100)
MONOCYTES # BLD AUTO: 0.4 THOUSAND/ΜL (ref 0.2–0.9)
MONOCYTES NFR BLD AUTO: 10 % (ref 1–10)
NEUTROPHILS # BLD AUTO: 1.9 THOUSANDS/ΜL (ref 1.8–7.8)
NEUTS SEG NFR BLD AUTO: 41 % (ref 45–65)
PLATELET # BLD AUTO: 147 THOUSANDS/UL (ref 150–450)
PMV BLD AUTO: 8.4 FL (ref 8.9–12.7)
POTASSIUM SERPL-SCNC: 4.3 MMOL/L (ref 3.6–5)
PROT SERPL-MCNC: 8.8 G/DL (ref 5.9–8.4)
PROTHROMBIN TIME: 13.1 SECONDS (ref 11.6–14.5)
RBC # BLD AUTO: 5.42 MILLION/UL (ref 4–5.2)
SODIUM SERPL-SCNC: 139 MMOL/L (ref 137–147)
TSH SERPL DL<=0.05 MIU/L-ACNC: 2.05 UIU/ML (ref 0.47–4.68)
WBC # BLD AUTO: 4.6 THOUSAND/UL (ref 4.5–11)

## 2020-08-18 PROCEDURE — 82784 ASSAY IGA/IGD/IGG/IGM EACH: CPT

## 2020-08-18 PROCEDURE — 83540 ASSAY OF IRON: CPT

## 2020-08-18 PROCEDURE — 84443 ASSAY THYROID STIM HORMONE: CPT

## 2020-08-18 PROCEDURE — 80307 DRUG TEST PRSMV CHEM ANLYZR: CPT

## 2020-08-18 PROCEDURE — 86256 FLUORESCENT ANTIBODY TITER: CPT

## 2020-08-18 PROCEDURE — 83516 IMMUNOASSAY NONANTIBODY: CPT

## 2020-08-18 PROCEDURE — 86708 HEPATITIS A ANTIBODY: CPT

## 2020-08-18 PROCEDURE — 83883 ASSAY NEPHELOMETRY NOT SPEC: CPT

## 2020-08-18 PROCEDURE — 86235 NUCLEAR ANTIGEN ANTIBODY: CPT

## 2020-08-18 PROCEDURE — 86706 HEP B SURFACE ANTIBODY: CPT

## 2020-08-18 PROCEDURE — 85610 PROTHROMBIN TIME: CPT

## 2020-08-18 PROCEDURE — 82172 ASSAY OF APOLIPOPROTEIN: CPT

## 2020-08-18 PROCEDURE — 86704 HEP B CORE ANTIBODY TOTAL: CPT

## 2020-08-18 PROCEDURE — 80320 DRUG SCREEN QUANTALCOHOLS: CPT

## 2020-08-18 PROCEDURE — 80053 COMPREHEN METABOLIC PANEL: CPT

## 2020-08-18 PROCEDURE — 82977 ASSAY OF GGT: CPT

## 2020-08-18 PROCEDURE — 36415 COLL VENOUS BLD VENIPUNCTURE: CPT

## 2020-08-18 PROCEDURE — 82247 BILIRUBIN TOTAL: CPT

## 2020-08-18 PROCEDURE — 85025 COMPLETE CBC W/AUTO DIFF WBC: CPT

## 2020-08-18 PROCEDURE — 84460 ALANINE AMINO (ALT) (SGPT): CPT

## 2020-08-18 PROCEDURE — 83010 ASSAY OF HAPTOGLOBIN QUANT: CPT

## 2020-08-18 PROCEDURE — 87902 NFCT AGT GNTYP ALYS HEP C: CPT

## 2020-08-18 PROCEDURE — 82390 ASSAY OF CERULOPLASMIN: CPT

## 2020-08-18 PROCEDURE — 80074 ACUTE HEPATITIS PANEL: CPT

## 2020-08-18 PROCEDURE — 86038 ANTINUCLEAR ANTIBODIES: CPT

## 2020-08-18 PROCEDURE — 87522 HEPATITIS C REVRS TRNSCRPJ: CPT

## 2020-08-18 PROCEDURE — 82728 ASSAY OF FERRITIN: CPT

## 2020-08-18 PROCEDURE — 86255 FLUORESCENT ANTIBODY SCREEN: CPT

## 2020-08-18 PROCEDURE — 87389 HIV-1 AG W/HIV-1&-2 AB AG IA: CPT

## 2020-08-19 LAB
ACTIN IGG SERPL-ACNC: 10 UNITS (ref 0–19)
CERULOPLASMIN SERPL-MCNC: 32.6 MG/DL (ref 19–39)
ENDOMYSIUM IGA SER QL: NEGATIVE
GLIADIN PEPTIDE IGA SER-ACNC: 4 UNITS (ref 0–19)
GLIADIN PEPTIDE IGG SER-ACNC: 2 UNITS (ref 0–19)
HAV AB SER QL IA: REACTIVE
HAV IGM SER QL: NORMAL
HBV CORE AB SER QL: ABNORMAL
HBV CORE IGM SER QL: ABNORMAL
HBV SURFACE AB SER-ACNC: 3.3 MIU/ML
HBV SURFACE AG SER QL: ABNORMAL
HCV AB SER QL: ABNORMAL
HIV 1+2 AB+HIV1 P24 AG SERPL QL IA: NORMAL
IGA SERPL-MCNC: 224 MG/DL (ref 87–352)
MITOCHONDRIA M2 IGG SER-ACNC: <20 UNITS (ref 0–20)
RYE IGE QN: NEGATIVE
TTG IGA SER-ACNC: <2 U/ML (ref 0–3)
TTG IGG SER-ACNC: <2 U/ML (ref 0–5)

## 2020-08-20 LAB
A2 MACROGLOB SERPL-MCNC: 379 MG/DL (ref 110–276)
ALT SERPL W P-5'-P-CCNC: 93 IU/L (ref 0–40)
APO A-I SERPL-MCNC: 144 MG/DL (ref 116–209)
BILIRUB SERPL-MCNC: 0.3 MG/DL (ref 0–1.2)
COMMENT: ABNORMAL
FIBROSIS SCORING:: ABNORMAL
FIBROSIS STAGE SERPL QL: ABNORMAL
GGT SERPL-CCNC: 135 IU/L (ref 0–60)
HAPTOGLOB SERPL-MCNC: 70 MG/DL (ref 33–346)
INTERPRETATIONS: ABNORMAL
LIVER FIBR SCORE SERPL CALC.FIBROSURE: 0.62 (ref 0–0.21)
NECROINFLAMM ACTIVITY SCORING:: ABNORMAL
NECROINFLAMMATORY ACT GRADE SERPL QL: ABNORMAL
NECROINFLAMMATORY ACT SCORE SERPL: 0.65 (ref 0–0.17)
SERVICE CMNT-IMP: ABNORMAL

## 2020-08-21 LAB
AMPHETAMINES UR QL SCN: NEGATIVE NG/ML
BARBITURATES UR QL SCN: NEGATIVE NG/ML
BENZODIAZ UR QL: NEGATIVE NG/ML
BZE UR QL: NEGATIVE NG/ML
CANNABINOIDS UR QL SCN: NEGATIVE NG/ML
HCV RNA SERPL NAA+PROBE-ACNC: NORMAL IU/ML
HCV RNA SERPL NAA+PROBE-LOG IU: 5.7 LOG10 IU/ML
METHADONE UR QL SCN: POSITIVE
OPIATES UR QL: NEGATIVE NG/ML
PCP UR QL: NEGATIVE NG/ML
PROPOXYPH UR QL SCN: NEGATIVE NG/ML
TEST INFORMATION: NORMAL

## 2020-08-22 LAB
HCV GENTYP SERPL NAA+PROBE: NORMAL
HCV PLEASE NOTE: NORMAL

## 2020-08-24 RX ORDER — GLECAPREVIR AND PIBRENTASVIR 40; 100 MG/1; MG/1
3 TABLET, FILM COATED ORAL DAILY
Qty: 168 TABLET | Refills: 0 | Status: SHIPPED | OUTPATIENT
Start: 2020-08-24 | End: 2020-10-19

## 2020-10-12 ENCOUNTER — TELEPHONE (OUTPATIENT)
Dept: GASTROENTEROLOGY | Facility: CLINIC | Age: 55
End: 2020-10-12

## 2020-10-13 ENCOUNTER — TRANSCRIBE ORDERS (OUTPATIENT)
Dept: GASTROENTEROLOGY | Facility: CLINIC | Age: 55
End: 2020-10-13

## 2020-10-22 ENCOUNTER — OFFICE VISIT (OUTPATIENT)
Dept: FAMILY MEDICINE CLINIC | Facility: CLINIC | Age: 55
End: 2020-10-22

## 2020-10-22 VITALS
SYSTOLIC BLOOD PRESSURE: 142 MMHG | OXYGEN SATURATION: 96 % | RESPIRATION RATE: 18 BRPM | WEIGHT: 209 LBS | HEART RATE: 76 BPM | TEMPERATURE: 96.8 F | BODY MASS INDEX: 39.49 KG/M2 | DIASTOLIC BLOOD PRESSURE: 84 MMHG

## 2020-10-22 DIAGNOSIS — Z87.898 HISTORY OF PREDIABETES: ICD-10-CM

## 2020-10-22 DIAGNOSIS — M54.41 CHRONIC BILATERAL LOW BACK PAIN WITH BILATERAL SCIATICA: Primary | ICD-10-CM

## 2020-10-22 DIAGNOSIS — Z23 NEED FOR VACCINATION: ICD-10-CM

## 2020-10-22 DIAGNOSIS — M54.42 CHRONIC BILATERAL LOW BACK PAIN WITH BILATERAL SCIATICA: Primary | ICD-10-CM

## 2020-10-22 DIAGNOSIS — G89.29 CHRONIC BILATERAL LOW BACK PAIN WITH BILATERAL SCIATICA: Primary | ICD-10-CM

## 2020-10-22 PROCEDURE — 99213 OFFICE O/P EST LOW 20 MIN: CPT | Performed by: FAMILY MEDICINE

## 2020-10-22 PROCEDURE — 96372 THER/PROPH/DIAG INJ SC/IM: CPT

## 2020-10-22 PROCEDURE — 90471 IMMUNIZATION ADMIN: CPT

## 2020-10-22 PROCEDURE — 90682 RIV4 VACC RECOMBINANT DNA IM: CPT

## 2020-10-22 RX ORDER — NAPROXEN 500 MG/1
500 TABLET ORAL 2 TIMES DAILY WITH MEALS
Qty: 60 TABLET | Refills: 1 | Status: SHIPPED | OUTPATIENT
Start: 2020-10-22 | End: 2021-01-25 | Stop reason: SDUPTHER

## 2020-10-22 RX ORDER — KETOROLAC TROMETHAMINE 30 MG/ML
30 INJECTION, SOLUTION INTRAMUSCULAR; INTRAVENOUS ONCE
Status: COMPLETED | OUTPATIENT
Start: 2020-10-22 | End: 2020-10-22

## 2020-10-22 RX ADMIN — KETOROLAC TROMETHAMINE 30 MG: 30 INJECTION, SOLUTION INTRAMUSCULAR; INTRAVENOUS at 14:57

## 2020-10-23 ENCOUNTER — TELEPHONE (OUTPATIENT)
Dept: GASTROENTEROLOGY | Facility: CLINIC | Age: 55
End: 2020-10-23

## 2020-10-23 DIAGNOSIS — B19.20 HEPATITIS C VIRUS INFECTION WITHOUT HEPATIC COMA, UNSPECIFIED CHRONICITY: Primary | ICD-10-CM

## 2020-10-26 ENCOUNTER — TELEPHONE (OUTPATIENT)
Dept: GASTROENTEROLOGY | Facility: AMBULARY SURGERY CENTER | Age: 55
End: 2020-10-26

## 2021-01-06 ENCOUNTER — TELEPHONE (OUTPATIENT)
Dept: GASTROENTEROLOGY | Facility: CLINIC | Age: 56
End: 2021-01-06

## 2021-01-06 DIAGNOSIS — B19.20 HEPATITIS C VIRUS INFECTION WITHOUT HEPATIC COMA, UNSPECIFIED CHRONICITY: Primary | ICD-10-CM

## 2021-01-06 NOTE — TELEPHONE ENCOUNTER
Found out that patient only took one month of her [address] which ended on 11/23  [facility] did not ship the next refill for medication  No one called me or [facility] to notify us  Should we do an SVR which was originally due on 3/15? Please Advise

## 2021-01-07 NOTE — TELEPHONE ENCOUNTER
[place]    Spoke to Jamal, patient's son  He will have patient get the lab done today  He understands the situation with patient only getting 1 month of pills  He also understands what a detected and non detected viral load means  I will call him with results

## 2021-01-25 ENCOUNTER — OFFICE VISIT (OUTPATIENT)
Dept: FAMILY MEDICINE CLINIC | Facility: CLINIC | Age: 56
End: 2021-01-25

## 2021-01-25 VITALS
DIASTOLIC BLOOD PRESSURE: 80 MMHG | TEMPERATURE: 98 F | HEART RATE: 81 BPM | RESPIRATION RATE: 20 BRPM | HEIGHT: 61 IN | OXYGEN SATURATION: 96 % | SYSTOLIC BLOOD PRESSURE: 142 MMHG | BODY MASS INDEX: 41.69 KG/M2 | WEIGHT: 220.8 LBS

## 2021-01-25 DIAGNOSIS — R73.03 PREDIABETES: ICD-10-CM

## 2021-01-25 DIAGNOSIS — M25.562 CHRONIC PAIN OF BOTH KNEES: Primary | ICD-10-CM

## 2021-01-25 DIAGNOSIS — G89.29 CHRONIC BILATERAL LOW BACK PAIN WITHOUT SCIATICA: ICD-10-CM

## 2021-01-25 DIAGNOSIS — G89.29 CHRONIC BILATERAL LOW BACK PAIN WITH BILATERAL SCIATICA: ICD-10-CM

## 2021-01-25 DIAGNOSIS — M54.50 CHRONIC BILATERAL LOW BACK PAIN WITHOUT SCIATICA: ICD-10-CM

## 2021-01-25 DIAGNOSIS — K59.03 DRUG-INDUCED CONSTIPATION: ICD-10-CM

## 2021-01-25 DIAGNOSIS — M54.41 CHRONIC BILATERAL LOW BACK PAIN WITH BILATERAL SCIATICA: ICD-10-CM

## 2021-01-25 DIAGNOSIS — G89.29 CHRONIC PAIN OF BOTH KNEES: Primary | ICD-10-CM

## 2021-01-25 DIAGNOSIS — M54.42 CHRONIC BILATERAL LOW BACK PAIN WITH BILATERAL SCIATICA: ICD-10-CM

## 2021-01-25 DIAGNOSIS — I10 ESSENTIAL HYPERTENSION: ICD-10-CM

## 2021-01-25 DIAGNOSIS — M25.561 CHRONIC PAIN OF BOTH KNEES: Primary | ICD-10-CM

## 2021-01-25 PROCEDURE — 3077F SYST BP >= 140 MM HG: CPT | Performed by: FAMILY MEDICINE

## 2021-01-25 PROCEDURE — 3725F SCREEN DEPRESSION PERFORMED: CPT | Performed by: FAMILY MEDICINE

## 2021-01-25 PROCEDURE — 3079F DIAST BP 80-89 MM HG: CPT | Performed by: FAMILY MEDICINE

## 2021-01-25 PROCEDURE — 99213 OFFICE O/P EST LOW 20 MIN: CPT | Performed by: FAMILY MEDICINE

## 2021-01-25 PROCEDURE — 3008F BODY MASS INDEX DOCD: CPT | Performed by: FAMILY MEDICINE

## 2021-01-25 PROCEDURE — 4004F PT TOBACCO SCREEN RCVD TLK: CPT | Performed by: FAMILY MEDICINE

## 2021-01-25 RX ORDER — POLYETHYLENE GLYCOL 3350 17 G/17G
17 POWDER, FOR SOLUTION ORAL DAILY PRN
Qty: 30 EACH | Refills: 1 | Status: SHIPPED | OUTPATIENT
Start: 2021-01-25 | End: 2021-03-29 | Stop reason: SDUPTHER

## 2021-01-25 RX ORDER — HYDROCHLOROTHIAZIDE 25 MG/1
25 TABLET ORAL DAILY
Qty: 30 TABLET | Refills: 1 | Status: SHIPPED | OUTPATIENT
Start: 2021-01-25 | End: 2021-04-26 | Stop reason: SDUPTHER

## 2021-01-25 RX ORDER — DOCUSATE SODIUM 100 MG/1
100 CAPSULE, LIQUID FILLED ORAL 2 TIMES DAILY PRN
Qty: 30 CAPSULE | Refills: 1 | Status: SHIPPED | OUTPATIENT
Start: 2021-01-25 | End: 2021-03-29 | Stop reason: SDUPTHER

## 2021-01-25 RX ORDER — AMLODIPINE BESYLATE 5 MG/1
5 TABLET ORAL DAILY
Qty: 30 TABLET | Refills: 1 | Status: SHIPPED | OUTPATIENT
Start: 2021-01-25 | End: 2022-01-25 | Stop reason: SDUPTHER

## 2021-01-25 RX ORDER — NAPROXEN 500 MG/1
500 TABLET ORAL 2 TIMES DAILY WITH MEALS
Qty: 60 TABLET | Refills: 1 | Status: SHIPPED | OUTPATIENT
Start: 2021-01-25

## 2021-01-25 NOTE — PATIENT INSTRUCTIONS
Dolor de rodilla   CUIDADO AMBULATORIO:   Dolor de rodilla puede empezar de forma repentina, o ser un problema a Barrett President  Puede sentir dolor en la parte lateral, delantera o trasera de la rodilla  Puede tener la rodilla rígida e hinchada  Puede oír sonidos de chasquido o sentir que la rodilla cede o se le bloquea al andar  Puede sentir dolor cuando se sienta, se pone de pie, camina o sube y baja escaleras  El dolor de rodilla puede estar provocado por condiciones nelson obesidad, inflamación, esguinces o desgarros de los ligamentos o los tendones  Busque atención médica de inmediato si:  · El dolor empeora, [address]  · No puede flexionar o enderezar la pierna completamente  · La hinchazón alrededor de la rodilla no disminuye a pesar del tratamiento  · La rodilla le duele y se nota caliente al tacto  Comuníquese con reynolds médico si:  · Usted tiene preguntas o inquietudes acerca de reynolds condición o cuidado  El tratamiento dependerá de la causa del dolor: Es posible que usted necesite alguno de los siguientes:  · [place], pueden disminuir la inflamación y el dolor o la fiebre  Carmela medicamento está disponible con o sin chris receta médica  Los DERRICK pueden causar sangrado estomacal o problemas renales en ciertas personas  Si usted yan un medicamento anticoagulante, siempre pregúntele a reynolds médico si los DERRICK son seguros para usted  Siempre kasandra la etiqueta de carmela medicamento y [place] instrucciones  · Acetaminofén urban el dolor y baja la fiebre  Está disponible sin receta médica  Pregunte la cantidad y la frecuencia con que debe tomarlos  [address]  Kasandra las etiquetas de todos los demás medicamentos que esté usando para saber si también contienen acetaminofén, o pregunte a reynolds médico o farmacéutico  El acetaminofén puede causar daño en el hígado cuando no se yan de forma correcta   No use más de 4 gramos (4000 miligramos) en total de acetaminofeno en un día     · Puede administrarse podrían administrarse  Pregunte al médico cómo debe mandie ant medicamento de forma duffy  Algunos medicamentos recetados para el dolor contienen acetaminofén  No tome otros medicamentos que contengan acetaminofén sin consultarlo con reynolds médico  Demasiado acetaminofeno puede causar daño al hígado  Los medicamentos recetados para el dolor podrían causar estreñimiento  Pregunte a reynolds médico nelson prevenir o tratar estreñimiento  · Las inyecciones de esteroides podrían administrarse en la rodilla  Los esteroides reducen la inflamación y el dolor  · La cirugía puede usarse para algunas lesiones, nelson para la reparación del desgarro del ligamento kelly anterior (LCA)  Lo que puede hacer para [place] síntomas:  · Repose la rodilla para que se pueda curar  Limite las actividades que aumenten el dolor  Quynh ejercicios de bajo impacto, nelson caminar o nadar  · Aplique hielo para ayudar a disminuir la inflamación y el dolor  Use chris compresa de hielo o ponga hielo triturado en chris bolsa de plástico  [place] con chris toalla antes de aplicarla sobre la herida  Aplique hielo krista 15 a 20 minutos por hora o según indicaciones  · Aplique compresión para ayudar a reducir la inflamación  Use chris férula o venda solamente si sigue las instrucciones del ana  · Eleve la rodilla para ayudar a disminuir el dolor y la inflamación  Eleve la rodilla mientras esté sentado o acostado  [place] la pierna sobre almohadones para mantener la rodilla por encima del corazón  · Evite que la rodilla se mueva, nelson se le haya indicado  Reynolds médico podría ponerle un yeso o férula  Usted podría necesitar de un yeso en reynolds pierna para estabilizar reynolds rodilla  El yeso en la pierna puede ajustarse para aumentar reynolds rango de movimiento a medida que reynolds Mirna Uriel  Lo que puede hacer para prevenir el dolor de rodilla:  · Mantenga un peso saludable   El exceso de peso aumenta reynolds riesgo de sufrir dolor de rodilla  Consulte con reynolds médico cuánto debería pesar  Él puede ayudarlo a crear un plan de pérdida de peso seguro si usted tiene sobrepeso  · Gasper Mosley o entrene correctamente  Utilice los aparatos adecuados para los deportes  Use zapatos que brinden un buen soporte  Verifique reynolds postura a menudo mientras hace ejercicio, juega deportes o entrena para un evento  [place] puede ayudar a prevenir el estrés y la tensión en las rodillas  Descanse entre sesiones para no esforzar excesivamente las rodillas  Acuda en 24 horas a chris randell de seguimiento con reynolds médico o nelson se le indique: Anote nereyda preguntas para que se acuerde de hacerlas krista nereyda visitas  © [place] [address] Information is for End User's use only and may not be sold, redistributed or otherwise used for commercial purposes  All illustrations and images included in CareNotes® are the copyrighted property of [employer]  or [address] [place] [address] es sólo para uso en educación  Reynolds intención no es darle un consejo médico sobre enfermedades o tratamientos  Colsulte con reynolds Joseph Dings farmacéutico antes de seguir cualquier régimen médico para saber si es seguro y efectivo para usted

## 2021-01-25 NOTE — PROGRESS NOTES
Assessment/Plan:    Chronic bilateral low back pain without sciatica  Chronic low back pain  Likely musculoskeletal  Discussed non-pharmacological therapy including application of heat 15 mins at a time up to 4 times daily  Will also place new referral to physical therapy  History of obesity, which is also likely adding to her back pain  She may also take naprosyn 500 mg BID for 1 week, then as needed for additional relief  Chronic pain of both knees  New complaints of bilateral knee pain with no known mechanism of injury  Likely osteoarthritis  Weight is likely a contributing factor  Naprosyn 500 mg BID for one week then as needed for pain relief, in addition to physical therapy and application of heat  Return in about 3 months (around 4/25/2021) for Next scheduled follow up low back pain and bilateral knee pain  Patient Instructions     Dolor de rodilla   CUIDADO AMBULATORIO:   Dolor de rodilla puede empezar de forma repentina, o ser un problema a Viann Salt[place]  Puede sentir dolor en la parte lateral, delantera o trasera de la rodilla  Puede tener la rodilla rígida e hinchada  Puede oír sonidos de chasquido o sentir que la rodilla cede o se le bloquea al andar  Puede sentir dolor cuando se sienta, se pone de pie, camina o sube y baja escaleras  El dolor de rodilla puede estar provocado por condiciones nelson obesidad, inflamación, esguinces o desgarros de los ligamentos o los tendones  Busque atención médica de inmediato si:  · El dolor empeora, [address]  · No puede flexionar o enderezar la pierna completamente  · La hinchazón alrededor de la rodilla no disminuye a pesar del tratamiento  · La rodilla le duele y se nota caliente al tacto  Comuníquese con reynolds médico si:  · Usted tiene preguntas o inquietudes acerca de reynolds condición o cuidado        El tratamiento dependerá de la causa del dolor: Es posible que usted necesite alguno de los siguientes:  · Donna Douglass disminuir la inflamación y el dolor o la fiebre  Carmela medicamento está disponible con o sin chris receta médica  Los [place]ICK pueden causar sangrado estomacal o problemas renales en ciertas personas  Si usted yan un medicamento anticoagulante, siempre pregúntele a reynolds médico si los DERRICK son seguros para usted  Siempre kasandra la etiqueta de carmela medicamento y [place] instrucciones  · Acetaminofén urban el dolor y baja la fiebre  Está disponible sin receta médica  Pregunte la cantidad y la frecuencia con que debe tomarlos  [address]  Kasandra las etiquetas de todos los demás medicamentos que esté usando para saber si también contienen acetaminofén, o pregunte a reynolds médico o farmacéutico  El acetaminofén puede causar daño en el hígado cuando no se yan de forma correcta  No use más de 4 gramos (4000 miligramos) en total de acetaminofeno en un día  · Puede administrarse podrían administrarse  Pregunte al médico cómo debe mandie carmela medicamento de forma duffy  Algunos medicamentos recetados para el dolor contienen acetaminofén  No tome otros medicamentos que contengan acetaminofén sin consultarlo con reynolds médico  Demasiado acetaminofeno puede causar daño al hígado  Los medicamentos recetados para el dolor podrían causar estreñimiento  Pregunte a reynolds médico nelson prevenir o tratar estreñimiento  · Las inyecciones de esteroides podrían administrarse en la rodilla  Los esteroides reducen la inflamación y el dolor  · La cirugía puede usarse para algunas lesiones, nelson para la reparación del desgarro del ligamento kelly anterior (LCA)  Lo que puede hacer para [place] síntomas:  · Repose la rodilla para que se pueda curar  Limite las actividades que aumenten el dolor  Quynh ejercicios de bajo impacto, nelson caminar o nadar  · Aplique hielo para ayudar a disminuir la inflamación y el dolor   Use chris compresa de hielo o ponga hielo triturado en chris bolsa de plástico  Arliss Shamir con chris toalla antes de aplicarla sobre la herida  Aplique hielo krista 15 a 20 minutos por hora o según indicaciones  · Aplique compresión para ayudar a reducir la inflamación  Use chris férula o venda solamente si sigue las instrucciones del ana  · Eleve la rodilla para ayudar a disminuir el dolor y la inflamación  Eleve la rodilla mientras esté sentado o acostado  [place] la pierna sobre almohadones para mantener la rodilla por encima del corazón  · Evite que la rodilla se mueva, nelson se le haya indicado  Reynolds médico podría ponerle un yeso o férula  Usted podría necesitar de un yeso en reynolds pierna para estabilizar reynolds rodilla  El yeso en la pierna puede ajustarse para aumentar reynolds rango de movimiento a medida que reynolds Caterina Tae  Lo que puede hacer para prevenir el dolor de rodilla:  · Mantenga un peso saludable  El exceso de peso aumenta reynolds riesgo de sufrir dolor de [address]  Consulte con reynolds médico cuánto debería pesar  [place] puede ayudarlo a crear un plan de pérdida de peso seguro si usted tiene sobrepeso  · Michelle Dustin o entrene correctamente  Utilice los aparatos adecuados para los deportes  Use zapatos que brinden un buen soporte  Verifique reynolds postura a menudo mientras hace ejercicio, juega deportes o entrena para un evento  [place] puede ayudar a prevenir el estrés y la tensión en las rodillas  Descanse entre sesiones para no esforzar excesivamente las rodillas  Acuda en 24 horas a chris randell de seguimiento con reynolds médico o nelson se le indique: Anote nereyda preguntas para que se acuerde de hacerlas krista nereyda visitas  © Copyright [address] Information is for End User's use only and may not be sold, redistributed or otherwise used for commercial purposes  All illustrations and images included in CareNotes® are the copyrighted property of A ROB A Danette SANCHEZ  or [address] [place] [address] es sólo para uso en educación  Reynolds intención no es darle un consejo médico sobre enfermedades o tratamientos   Colsulte con [employer] , enfermera o farmacéutico antes de seguir cualquier régimen médico para saber si es seguro y efectivo para usted  Diagnoses and all orders for this visit:    Chronic pain of both knees  -     Ambulatory referral to Physical Therapy; Future    Essential hypertension  -     amLODIPine (NORVASC) 5 mg tablet; Take 1 tablet (5 mg total) by mouth daily  -     hydrochlorothiazide (HYDRODIURIL) 25 mg tablet; Take 1 tablet (25 mg total) by mouth daily    Drug-induced constipation  -     docusate sodium (COLACE) 100 mg capsule; Take 1 capsule (100 mg total) by mouth 2 (two) times a day as needed for constipation  -     polyethylene glycol (MIRALAX) 17 g packet; Take 17 g by mouth daily as needed (constipation)    Chronic bilateral low back pain with bilateral sciatica  -     naproxen (NAPROSYN) 500 mg tablet; Take 1 tablet (500 mg total) by mouth 2 (two) times a day with meals  -     Ambulatory referral to Physical Therapy; Future    Prediabetes  -     metFORMIN (GLUCOPHAGE) 500 mg tablet; Take 1 tablet (500 mg total) by mouth daily with breakfast    Chronic bilateral low back pain without sciatica          Subjective:     Josi Acuña is a 54 y o  female who  has a past medical history of Asthma, Hypertension, and Psychiatric disorder  who presented to the office today for follow up of low back pain  HPI  Patient's preferred language is OUYA  Services used for interpretation:  #[id number]     She continues to complain of low back pain  She was referred to physical therapy the last encounter, but she forgot about it  She is also complaining of knee pain bilaterally  The pain is worse in the morning but hurts all day  She denies any injury, locking, weakness  Her hypertension is managed with amlodipine 5 mg daily and hctz 25 mg mg daily  She is tolerating these well  She has regular bowel movements with the help of colace and miralax       Review of Systems   Constitutional: Negative for fatigue and fever  Respiratory: Negative for cough and shortness of breath  Cardiovascular: Negative for chest pain and palpitations  Gastrointestinal: Negative for abdominal pain  Musculoskeletal: Negative for back pain and myalgias  Skin: Negative for rash  Neurological: Negative for dizziness and weakness  Objective:    /80 (BP Location: Left arm, Patient Position: Sitting, Cuff Size: Adult)   Pulse 81   Temp 98 °F (36 7 °C) (Temporal)   Resp 20   Ht 5' 1" (1 549 m)   Wt 100 kg (220 lb 12 8 oz)   SpO2 96%   BMI 41 72 kg/m²     PHQ-9 Depression Screening    PHQ-9:   Frequency of the following problems over the past two weeks:      Little interest or pleasure in doing things: 0 - not at all  Feeling down, depressed, or hopeless: 1 - several days  PHQ-2 Score: 1          Physical Exam  Vitals signs reviewed  Constitutional:       General: She is not in acute distress  Appearance: She is well-developed  She is obese  Comments: BMI 41 72   HENT:      Head: Normocephalic and atraumatic  Eyes:      General: No scleral icterus  Conjunctiva/sclera: Conjunctivae normal    Neck:      Musculoskeletal: Normal range of motion and neck supple  Trachea: No tracheal deviation  Cardiovascular:      Rate and Rhythm: Normal rate  Pulmonary:      Effort: Pulmonary effort is normal  No respiratory distress  Musculoskeletal:         General: No swelling or deformity  Lymphadenopathy:      Cervical: No cervical adenopathy  Skin:     General: Skin is warm and dry  Findings: No rash  Neurological:      General: No focal deficit present  Mental Status: She is alert     Psychiatric:         Mood and Affect: Mood normal          Rashmi Oliva MD  01/25/21  9:01 PM

## 2021-01-26 NOTE — ASSESSMENT & PLAN NOTE
New complaints of bilateral knee pain with no known mechanism of injury  Likely osteoarthritis  Weight is likely a contributing factor  Naprosyn 500 mg BID for one week then as needed for pain relief, in addition to physical therapy and application of heat

## 2021-01-26 NOTE — ASSESSMENT & PLAN NOTE
Chronic low back pain  Likely musculoskeletal  Discussed non-pharmacological therapy including application of heat 15 mins at a time up to 4 times daily  Will also place new referral to physical therapy  History of obesity, which is also likely adding to her back pain  She may also take naprosyn 500 mg BID for 1 week, then as needed for additional relief

## 2021-03-23 NOTE — TELEPHONE ENCOUNTER
CHAPITO    Called patient today and explained that she could still be cured even if she only took 1 month of [address]  Impressed upon her to get the bloodwork done to find out if she has been cured of Hep C    She says she will get labs done

## 2021-03-29 ENCOUNTER — TELEPHONE (OUTPATIENT)
Dept: FAMILY MEDICINE CLINIC | Facility: CLINIC | Age: 56
End: 2021-03-29

## 2021-03-29 DIAGNOSIS — K59.03 DRUG-INDUCED CONSTIPATION: ICD-10-CM

## 2021-03-29 RX ORDER — POLYETHYLENE GLYCOL 3350 17 G/17G
17 POWDER, FOR SOLUTION ORAL DAILY PRN
Qty: 30 EACH | Refills: 1 | Status: SHIPPED | OUTPATIENT
Start: 2021-03-29 | End: 2021-04-26 | Stop reason: SDUPTHER

## 2021-03-29 RX ORDER — DOCUSATE SODIUM 100 MG/1
100 CAPSULE, LIQUID FILLED ORAL 2 TIMES DAILY PRN
Qty: 30 CAPSULE | Refills: 1 | Status: SHIPPED | OUTPATIENT
Start: 2021-03-29 | End: 2021-04-26 | Stop reason: SDUPTHER

## 2021-03-29 NOTE — TELEPHONE ENCOUNTER
PATIENT CAME IN REQUESTING REFILLS ON   docusate sodium (COLACE) 100 mg capsule    polyethylene glycol (MIRALAX) 17 g packet

## 2021-04-26 ENCOUNTER — OFFICE VISIT (OUTPATIENT)
Dept: FAMILY MEDICINE CLINIC | Facility: CLINIC | Age: 56
End: 2021-04-26

## 2021-04-26 VITALS
HEIGHT: 61 IN | TEMPERATURE: 97.9 F | RESPIRATION RATE: 20 BRPM | OXYGEN SATURATION: 97 % | SYSTOLIC BLOOD PRESSURE: 140 MMHG | WEIGHT: 219 LBS | HEART RATE: 71 BPM | BODY MASS INDEX: 41.35 KG/M2 | DIASTOLIC BLOOD PRESSURE: 96 MMHG

## 2021-04-26 DIAGNOSIS — I10 ESSENTIAL HYPERTENSION: ICD-10-CM

## 2021-04-26 DIAGNOSIS — M54.42 CHRONIC BILATERAL LOW BACK PAIN WITH BILATERAL SCIATICA: Primary | ICD-10-CM

## 2021-04-26 DIAGNOSIS — K59.03 DRUG-INDUCED CONSTIPATION: ICD-10-CM

## 2021-04-26 DIAGNOSIS — M25.562 CHRONIC PAIN OF BOTH KNEES: ICD-10-CM

## 2021-04-26 DIAGNOSIS — G89.29 CHRONIC BILATERAL LOW BACK PAIN WITH BILATERAL SCIATICA: Primary | ICD-10-CM

## 2021-04-26 DIAGNOSIS — M54.41 CHRONIC BILATERAL LOW BACK PAIN WITH BILATERAL SCIATICA: Primary | ICD-10-CM

## 2021-04-26 DIAGNOSIS — G89.29 CHRONIC PAIN OF BOTH KNEES: ICD-10-CM

## 2021-04-26 DIAGNOSIS — E66.01 CLASS 3 SEVERE OBESITY DUE TO EXCESS CALORIES WITHOUT SERIOUS COMORBIDITY WITH BODY MASS INDEX (BMI) OF 40.0 TO 44.9 IN ADULT (HCC): ICD-10-CM

## 2021-04-26 DIAGNOSIS — M25.561 CHRONIC PAIN OF BOTH KNEES: ICD-10-CM

## 2021-04-26 PROBLEM — E66.813 CLASS 3 SEVERE OBESITY DUE TO EXCESS CALORIES WITHOUT SERIOUS COMORBIDITY WITH BODY MASS INDEX (BMI) OF 40.0 TO 44.9 IN ADULT (HCC): Status: ACTIVE | Noted: 2021-04-26

## 2021-04-26 PROCEDURE — 99213 OFFICE O/P EST LOW 20 MIN: CPT | Performed by: FAMILY MEDICINE

## 2021-04-26 RX ORDER — GABAPENTIN 100 MG/1
100 CAPSULE ORAL
Qty: 30 CAPSULE | Refills: 1 | Status: SHIPPED | OUTPATIENT
Start: 2021-04-26

## 2021-04-26 RX ORDER — DOCUSATE SODIUM 100 MG/1
100 CAPSULE, LIQUID FILLED ORAL 2 TIMES DAILY PRN
Qty: 30 CAPSULE | Refills: 1 | Status: SHIPPED | OUTPATIENT
Start: 2021-04-26

## 2021-04-26 RX ORDER — POLYETHYLENE GLYCOL 3350 17 G/17G
17 POWDER, FOR SOLUTION ORAL DAILY
Qty: 850 G | Refills: 1 | Status: SHIPPED | OUTPATIENT
Start: 2021-04-26

## 2021-04-26 RX ORDER — HYDROCHLOROTHIAZIDE 25 MG/1
25 TABLET ORAL DAILY
Qty: 30 TABLET | Refills: 1 | Status: SHIPPED | OUTPATIENT
Start: 2021-04-26 | End: 2022-01-25 | Stop reason: SDUPTHER

## 2021-04-26 RX ORDER — POLYETHYLENE GLYCOL 3350 17 G/17G
POWDER, FOR SOLUTION ORAL
COMMUNITY
Start: 2021-03-29 | End: 2021-04-26 | Stop reason: SDUPTHER

## 2021-04-26 NOTE — ASSESSMENT & PLAN NOTE
Likely osteoarthritis  Weight is likely a contributing factor  Naprosyn 411 mg BID prn, application of heat  Will recruit assistance of referral team to help her get scheduled with physical therapy

## 2021-04-26 NOTE — PROGRESS NOTES
Assessment/Plan:    Class 3 severe obesity due to excess calories without serious comorbidity with body mass index (BMI) of 40 0 to 44 9 in [facility])  Discussed lifestyle modifications including diet and exercise  Also explained how weight loss would be helpful for her back and knee pains  Will place referral to weight management  Chronic pain of both knees  Likely osteoarthritis  Weight is likely a contributing factor  Naprosyn 836 mg BID prn, application of heat  Will recruit assistance of referral team to help her get scheduled with physical therapy  Chronic bilateral low back pain with bilateral sciatica  Chronic low back pain  Likely musculoskeletal  Discussed non-pharmacological therapy including application of heat 15 mins at a time up to 4 times daily, physical therapy, naproxen 500 mg BID prn  Cautioned to avoid combining with ibuprofen as they are of the same category of NSAIDs  History of obesity, which is also likely adding to her back pain  Return for GYN exam       Diagnoses and all orders for this visit:    Chronic bilateral low back pain with bilateral sciatica  -     gabapentin (NEURONTIN) 100 mg capsule; Take 1 capsule (100 mg total) by mouth daily at bedtime    Chronic pain of both knees    Class 3 severe obesity due to excess calories without serious comorbidity with body mass index (BMI) of 40 0 to 44 9 in [facility])  -     Ambulatory referral to Weight Management; Future    Drug-induced constipation  -     docusate sodium (COLACE) 100 mg capsule; Take 1 capsule (100 mg total) by mouth 2 (two) times a day as needed for constipation  -     polyethylene glycol (GLYCOLAX) 17 GM/SCOOP powder; Take 17 g by mouth daily    Essential hypertension  -     hydrochlorothiazide (HYDRODIURIL) 25 mg tablet;  Take 1 tablet (25 mg total) by mouth daily    Other orders  -     Discontinue: polyethylene glycol (GLYCOLAX) 17 GM/SCOOP powder          Subjective:     Hannah Lovett is a 54 y o  female who  has a past medical history of Asthma, Hypertension, and Psychiatric disorder  who presented to the office today for bilateral knee pain, low back pain  HPI    She continues to have low back pain and bilateral knee pain  She states she never got a call from physical therapy  She's been taking naproxen as needed for pain relief as well as advil  Review of Systems   Constitutional: Negative for fatigue and fever  Respiratory: Negative for cough and shortness of breath  Cardiovascular: Negative for chest pain and palpitations  Gastrointestinal: Negative for abdominal pain  Musculoskeletal: Positive for arthralgias (knees) and back pain  Negative for myalgias  Skin: Negative for rash  Neurological: Negative for dizziness and weakness  Objective:    /96 (BP Location: Left arm, Patient Position: Sitting, Cuff Size: Large)   Pulse 71   Temp 97 9 °F (36 6 °C) (Temporal)   Resp 20   Ht 5' 1" (1 549 m)   Wt 99 3 kg (219 lb)   SpO2 97%   BMI 41 38 kg/m²     Physical Exam  Constitutional:       General: She is not in acute distress  Appearance: She is well-developed  HENT:      Head: Normocephalic and atraumatic  Eyes:      General: No scleral icterus  Conjunctiva/sclera: Conjunctivae normal    Neck:      Musculoskeletal: Normal range of motion and neck supple  Trachea: No tracheal deviation  Cardiovascular:      Rate and Rhythm: Normal rate and regular rhythm  Heart sounds: Normal heart sounds  No murmur  No friction rub  Pulmonary:      Effort: Pulmonary effort is normal  No respiratory distress  Musculoskeletal:         General: Tenderness (paraspinal muscles in the lumbar region) present  No deformity  Lymphadenopathy:      Cervical: No cervical adenopathy  Skin:     General: Skin is warm and dry  Findings: No rash  Neurological:      Mental Status: She is alert and oriented to person, place, and time        Cranial Nerves: No cranial nerve deficit     Psychiatric:         Mood and Affect: Mood normal          Magdiel Phelan MD  04/26/21  4:21 PM

## 2021-04-26 NOTE — Clinical Note
I placed a referral for physical therapy, but patient states she never got a call  Can we please help her get an appointment? For weight management as well? Thanks so much!

## 2021-04-26 NOTE — ASSESSMENT & PLAN NOTE
Chronic low back pain  Likely musculoskeletal  Discussed non-pharmacological therapy including application of heat 15 mins at a time up to 4 times daily, physical therapy, naproxen 500 mg BID prn  Cautioned to avoid combining with ibuprofen as they are of the same category of NSAIDs  History of obesity, which is also likely adding to her back pain

## 2021-04-26 NOTE — ASSESSMENT & PLAN NOTE
Discussed lifestyle modifications including diet and exercise  Also explained how weight loss would be helpful for her back and knee pains  Will place referral to weight management

## 2021-05-17 ENCOUNTER — TELEPHONE (OUTPATIENT)
Dept: FAMILY MEDICINE CLINIC | Facility: CLINIC | Age: 56
End: 2021-05-17

## 2021-05-17 NOTE — TELEPHONE ENCOUNTER
----- Message from Boone Martin MD sent at 4/26/2021  3:42 PM EDT -----  I placed a referral for physical therapy, but patient states she never got a call  Can we please help her get an appointment? For weight management as well? Thanks so much!

## 2021-05-17 NOTE — TELEPHONE ENCOUNTER
Called phone number on file  No answer  Unable to LM  Scheduling letter and PT location guide mailed to patient address

## 2021-08-23 ENCOUNTER — APPOINTMENT (EMERGENCY)
Dept: RADIOLOGY | Facility: HOSPITAL | Age: 56
End: 2021-08-23
Payer: COMMERCIAL

## 2021-08-23 ENCOUNTER — HOSPITAL ENCOUNTER (EMERGENCY)
Facility: HOSPITAL | Age: 56
Discharge: HOME | End: 2021-08-23
Attending: EMERGENCY MEDICINE | Admitting: EMERGENCY MEDICINE
Payer: COMMERCIAL

## 2021-08-23 VITALS
HEART RATE: 59 BPM | OXYGEN SATURATION: 97 % | BODY MASS INDEX: 40.59 KG/M2 | SYSTOLIC BLOOD PRESSURE: 174 MMHG | RESPIRATION RATE: 16 BRPM | WEIGHT: 215 LBS | HEIGHT: 61 IN | TEMPERATURE: 98.2 F | DIASTOLIC BLOOD PRESSURE: 111 MMHG

## 2021-08-23 DIAGNOSIS — M25.561 RIGHT KNEE PAIN, UNSPECIFIED CHRONICITY: Primary | ICD-10-CM

## 2021-08-23 PROCEDURE — 99283 EMERGENCY DEPT VISIT LOW MDM: CPT

## 2021-08-23 PROCEDURE — 99284 EMERGENCY DEPT VISIT MOD MDM: CPT | Performed by: EMERGENCY MEDICINE

## 2021-08-23 PROCEDURE — 96372 THER/PROPH/DIAG INJ SC/IM: CPT

## 2021-08-23 PROCEDURE — 73564 X-RAY EXAM KNEE 4 OR MORE: CPT

## 2021-08-23 RX ORDER — KETOROLAC TROMETHAMINE 30 MG/ML
15 INJECTION, SOLUTION INTRAMUSCULAR; INTRAVENOUS ONCE
Status: COMPLETED | OUTPATIENT
Start: 2021-08-23 | End: 2021-08-23

## 2021-08-23 RX ORDER — ACETAMINOPHEN 325 MG/1
975 TABLET ORAL ONCE
Status: COMPLETED | OUTPATIENT
Start: 2021-08-23 | End: 2021-08-23

## 2021-08-23 RX ADMIN — KETOROLAC TROMETHAMINE 15 MG: 30 INJECTION, SOLUTION INTRAMUSCULAR; INTRAVENOUS at 18:12

## 2021-08-23 RX ADMIN — ACETAMINOPHEN 975 MG: 325 TABLET, FILM COATED ORAL at 18:12

## 2021-08-24 NOTE — ED PROVIDER NOTES
History  Chief Complaint   Patient presents with    Knee Swelling     pt c o R knee swelling that started about week ago  Pt states she is unable to walk     Patient is a 20-year-old female, past medical history of hypertension, who presents to the emergency department for right knee pain  Patient states she has had right knee pain for the past couple of weeks  However, it has acutely worsened over the past day or 2  She describes it as a constant pain, worse when she walks  There are no relieving factors, and patient states she has not tried any medications for her pain yet  Patient denies any direct injuries, falls, or recent trauma  She denies any prior history of symptoms like this in the past   She denies any other new or worsening symptoms  Prior to Admission Medications   Prescriptions Last Dose Informant Patient Reported? Taking?    amLODIPine (NORVASC) 5 mg tablet   No No   Sig: Take 1 tablet (5 mg total) by mouth daily   docusate sodium (COLACE) 100 mg capsule   No No   Sig: Take 1 capsule (100 mg total) by mouth 2 (two) times a day as needed for constipation   gabapentin (NEURONTIN) 100 mg capsule   No No   Sig: Take 1 capsule (100 mg total) by mouth daily at bedtime   hydrochlorothiazide (HYDRODIURIL) 25 mg tablet   No No   Sig: Take 1 tablet (25 mg total) by mouth daily   metFORMIN (GLUCOPHAGE) 500 mg tablet   No No   Sig: Take 1 tablet (500 mg total) by mouth daily with breakfast   naproxen (NAPROSYN) 500 mg tablet   No No   Sig: Take 1 tablet (500 mg total) by mouth 2 (two) times a day with meals   polyethylene glycol (GLYCOLAX) 17 GM/SCOOP powder   No No   Sig: Take 17 g by mouth daily   sertraline (ZOLOFT) 50 mg tablet  Self No No   Sig: Take 1 tablet (50 mg total) by mouth daily      Facility-Administered Medications: None       Past Medical History:   Diagnosis Date    Asthma     Hypertension     Psychiatric disorder        Past Surgical History:   Procedure Laterality Date     SECTION      CHOLECYSTECTOMY         History reviewed  No pertinent family history  I have reviewed and agree with the history as documented  E-Cigarette/Vaping    E-Cigarette Use Never User      E-Cigarette/Vaping Substances     Social History     Tobacco Use    Smoking status: Current Every Day Smoker     Packs/day: 0 50     Types: Cigarettes    Smokeless tobacco: Never Used   Vaping Use    Vaping Use: Never used   Substance Use Topics    Alcohol use: Not Currently    Drug use: Not Currently     Types: Cocaine, Heroin        Review of Systems   Constitutional: Negative for chills and fever  Respiratory: Negative for shortness of breath  Cardiovascular: Negative for chest pain and leg swelling  Gastrointestinal: Negative for abdominal pain, diarrhea, nausea and vomiting  Musculoskeletal: Negative for back pain and neck pain  Right knee pain   All other systems reviewed and are negative  Physical Exam  ED Triage Vitals   Temperature Pulse Respirations Blood Pressure SpO2   21 1639 21 1639 21 1639 21 1639 21 1639   98 2 °F (36 8 °C) 59 16 (!) 174/111 97 %      Temp Source Heart Rate Source Patient Position - Orthostatic VS BP Location FiO2 (%)   21 1639 21 1639 -- -- --   Oral Monitor         Pain Score       21 1646       Worst Possible Pain             Orthostatic Vital Signs  Vitals:    21 1639   BP: (!) 174/111   Pulse: 59       Physical Exam  Vitals and nursing note reviewed  Constitutional:       General: She is not in acute distress  Appearance: She is well-developed  She is not diaphoretic  HENT:      Head: Normocephalic and atraumatic  Right Ear: External ear normal       Left Ear: External ear normal       Nose: Nose normal    Eyes:      General: Lids are normal  No scleral icterus  Cardiovascular:      Rate and Rhythm: Normal rate and regular rhythm  Heart sounds: Normal heart sounds   No murmur heard    No friction rub  No gallop  Pulmonary:      Effort: Pulmonary effort is normal  No respiratory distress  Breath sounds: Normal breath sounds  No wheezing or rales  Musculoskeletal:         General: No deformity  Normal range of motion  Cervical back: Normal range of motion and neck supple  Comments: There is mild tenderness to palpation over the right lateral knee, patellar tendon, as well as quadriceps tendon  No obvious deformities  No obvious swelling  No overlying skin changes  Negative anterior and posterior drawer test   Negative medial and lateral ligamentous laxity  Patella midline  No patellar tenderness to palpation  Compartments are soft  Peripheral pulses intact  Skin:     General: Skin is warm and dry  Neurological:      General: No focal deficit present  Mental Status: She is alert  Psychiatric:         Mood and Affect: Mood normal          Behavior: Behavior normal          ED Medications  Medications   acetaminophen (TYLENOL) tablet 975 mg (975 mg Oral Given 8/23/21 1812)   ketorolac (TORADOL) injection 15 mg (15 mg Intramuscular Given 8/23/21 1812)       Diagnostic Studies  Results Reviewed     None                 XR knee 4+ views Right injury   ED Interpretation by Jami Olivarez DO (08/23 2037)   No acute osseous abnormality            Procedures  Procedures      ED Course  ED Course as of Aug 23 2339   Mon Aug 23, 2021   2037 XR knee 4+ views Right injury   2037 Patient re-evaluated  Resting comfortably  Reports some improvement of pain  Will d/c with crutches  Recommended orthopedic followup for any persistent pain  Return precautions discussed  Patient verbalized understanding and agreed with plan of care  SBIRT 22yo+      Most Recent Value   SBIRT (24 yo +)   In order to provide better care to our patients, we are screening all of our patients for alcohol and drug use   Would it be okay to ask you these screening questions? Unable to answer at this time Filed at: 08/23/2021 1812                [facility]  Number of Diagnoses or Management Options  Right knee pain, unspecified chronicity  Diagnosis management comments: Patient is a 54 y o  female who presents to the ED for right knee pain  Significant physical exam findings include mild tenderness to palpation over the inferior, lateral, and superior right knee  Clinical impression is knee sprain/strain, possibly osteoarthritis  Low suspicion for fracture or dislocation as pain has been present for >1 week and there has been no reported trauma or falls  Plan:  Plain films, pain control, reassessment  DC with crutches and orthopedics follow                 Portions of the record may have been created with voice recognition software  Occasional wrong word or "sound a like" substitutions may have occurred due to the inherent limitations of voice recognition software  Read the chart carefully and recognize, using context, where substitutions have occurred  Amount and/or Complexity of Data Reviewed  Tests in the radiology section of CPT®: ordered and reviewed  Independent visualization of images, tracings, or specimens: yes    Risk of Complications, Morbidity, and/or Mortality  Presenting problems: moderate  Diagnostic procedures: moderate  Management options: moderate    Patient Progress  Patient progress: stable      Disposition  Final diagnoses:   Right knee pain, unspecified chronicity     Time reflects when diagnosis was documented in both MDM as applicable and the Disposition within this note     Time User Action Codes Description Comment    8/23/2021  8:22 PM Alana Nixon Add [D77 [id number]] Right knee pain, unspecified chronicity       ED Disposition     ED Disposition Condition Date/Time Comment    Discharge Stable Mon Aug 23, 2021  7:45 PM Sindy Wolf discharge to home/self care              Follow-up Information     Follow up With Specialties Details Why Contact Info Additional [address] Emergency Department Emergency Medicine  As needed [address]  [address] [facility] [address] Emergency Department, [address] [place], [place], [address]    30 [facility] Orthopedic Surgery   [address] [facility] [facility], [address] [place], [place], [address] [facility]          Discharge Medication List as of 8/23/2021  8:22 PM      CONTINUE these medications which have NOT CHANGED    Details   amLODIPine (NORVASC) 5 mg tablet Take 1 tablet (5 mg total) by mouth daily, Starting Mon 1/25/2021, Normal      docusate sodium (COLACE) 100 mg capsule Take 1 capsule (100 mg total) by mouth 2 (two) times a day as needed for constipation, Starting Mon 4/26/2021, Normal      gabapentin (NEURONTIN) 100 mg capsule Take 1 capsule (100 mg total) by mouth daily at bedtime, Starting Mon 4/26/2021, Normal      hydrochlorothiazide (HYDRODIURIL) 25 mg tablet Take 1 tablet (25 mg total) by mouth daily, Starting Mon 4/26/2021, Normal      metFORMIN (GLUCOPHAGE) 500 mg tablet Take 1 tablet (500 mg total) by mouth daily with breakfast, Starting Mon 1/25/2021, Normal      naproxen (NAPROSYN) 500 mg tablet Take 1 tablet (500 mg total) by mouth 2 (two) times a day with meals, Starting Mon 1/25/2021, Normal      polyethylene glycol (GLYCOLAX) 17 GM/SCOOP powder Take 17 g by mouth daily, Starting Mon 4/26/2021, Normal      sertraline (ZOLOFT) 50 mg tablet Take 1 tablet (50 mg total) by mouth daily, Starting Wed 4/8/2020, Normal           No discharge procedures on file  PDMP Review     None           ED Provider  Attending physically available and evaluated Gerald Freeman I managed the patient along with the ED Attending      Electronically Signed by         Linda Hale DO  08/24/21 [address],   08/24/21 1804

## 2021-08-24 NOTE — ED ATTENDING ATTESTATION
8/23/2021  I, Xu Steele MD, saw and evaluated the patient  I have discussed the patient with the resident/non-physician practitioner and agree with the resident's/non-physician practitioner's findings, Plan of Care, and MDM as documented in the resident's/non-physician practitioner's note, except where noted  All available labs and Radiology studies were reviewed  I was present for key portions of any procedure(s) performed by the resident/non-physician practitioner and I was immediately available to provide assistance  At this point I agree with the current assessment done in the Emergency Department  I have conducted an independent evaluation of this patient a history and physical is as follows:    ED Course       Patient is a 59-year-old female presents with atraumatic right knee pain swelling past several weeks  Associated symptoms include antalgic gait  vitals reviewed   examination of right lower extremity shows warm well perfused limb with no ankle or hip pain  There is pain and tenderness to palpation of the patella  Lower portion of the knee mild pain with axial loading  Patient able to actively and passively flex knee with minimal discomfort  Joint is cool to touch no associated erythema  Impression: Right knee pain  Pain control check skeletal radiographs anticipate discharge with crutches and follow-up with orthopedics      Critical Care Time  Procedures

## 2021-08-24 NOTE — DISCHARGE INSTRUCTIONS
You have been evaluated in the Emergency Department today for right knee pain  Your evaluation did not find evidence of medical conditions requiring emergent intervention at this time  We have provided crutches for you to use while your knee heals  Please rest, ice, and elevate your knee, and resume normal activities as tolerated  You may take ibuprofen every 6 hours or tylenol every 6 hours as needed for pain  If needed, you can alternate these medications so that you take one medication every 3 hours  For instance, at noon take ibuprofen, then at 3pm take tylenol, then at 6pm take ibuprofen  Please schedule an appointment for follow up with Orthopedics if your pain persists  Return to the Emergency Department if you experience worsening pain, numbness, tingling, change of color in your toes, or any other concerning symptoms

## 2021-11-12 ENCOUNTER — OFFICE VISIT (OUTPATIENT)
Dept: LAB | Facility: HOSPITAL | Age: 56
End: 2021-11-12
Payer: COMMERCIAL

## 2021-11-12 PROCEDURE — 93005 ELECTROCARDIOGRAM TRACING: CPT

## 2021-11-15 LAB
ATRIAL RATE: 72 BPM
P AXIS: 63 DEGREES
PR INTERVAL: 170 MS
QRS AXIS: 17 DEGREES
QRSD INTERVAL: 82 MS
QT INTERVAL: 414 MS
QTC INTERVAL: 453 MS
T WAVE AXIS: 38 DEGREES
VENTRICULAR RATE: 72 BPM

## 2021-11-15 PROCEDURE — 93010 ELECTROCARDIOGRAM REPORT: CPT | Performed by: INTERNAL MEDICINE

## 2022-01-25 ENCOUNTER — OFFICE VISIT (OUTPATIENT)
Dept: FAMILY MEDICINE CLINIC | Facility: CLINIC | Age: 57
End: 2022-01-25

## 2022-01-25 VITALS
RESPIRATION RATE: 16 BRPM | TEMPERATURE: 98 F | WEIGHT: 216 LBS | SYSTOLIC BLOOD PRESSURE: 144 MMHG | BODY MASS INDEX: 40.78 KG/M2 | HEIGHT: 61 IN | DIASTOLIC BLOOD PRESSURE: 100 MMHG | HEART RATE: 82 BPM | OXYGEN SATURATION: 95 %

## 2022-01-25 DIAGNOSIS — I10 ESSENTIAL HYPERTENSION: Primary | ICD-10-CM

## 2022-01-25 DIAGNOSIS — E66.01 CLASS 3 SEVERE OBESITY DUE TO EXCESS CALORIES WITHOUT SERIOUS COMORBIDITY WITH BODY MASS INDEX (BMI) OF 40.0 TO 44.9 IN ADULT (HCC): ICD-10-CM

## 2022-01-25 PROCEDURE — 3077F SYST BP >= 140 MM HG: CPT | Performed by: PHYSICIAN ASSISTANT

## 2022-01-25 PROCEDURE — 3080F DIAST BP >= 90 MM HG: CPT | Performed by: PHYSICIAN ASSISTANT

## 2022-01-25 PROCEDURE — 99213 OFFICE O/P EST LOW 20 MIN: CPT | Performed by: PHYSICIAN ASSISTANT

## 2022-01-25 RX ORDER — HYDROCHLOROTHIAZIDE 25 MG/1
25 TABLET ORAL DAILY
Qty: 30 TABLET | Refills: 1 | Status: SHIPPED | OUTPATIENT
Start: 2022-01-25 | End: 2022-08-05 | Stop reason: SDUPTHER

## 2022-01-25 RX ORDER — AMLODIPINE BESYLATE 5 MG/1
5 TABLET ORAL DAILY
Qty: 30 TABLET | Refills: 1 | Status: SHIPPED | OUTPATIENT
Start: 2022-01-25 | End: 2022-08-05 | Stop reason: SDUPTHER

## 2022-01-25 NOTE — PATIENT INSTRUCTIONS
Edema de la pierna   LO QUE NECESITA SABER:   El edema de la pierna es chris inflamación causada por la acumulación de líquido  Las piernas pueden hincharse si usted está sentado o de pie krista largos períodos de [place], está [place], o está lesionado  La inflamación también se puede presentar si usted tiene insuficiencia cardíaca o problemas de la circulación  [place] significa que reynolds corazón no bombea jamey a reynolds cuerpo nelson debería  INSTRUCCIONES SOBRE EL [facility] HOSPITALARIA:   Llame al número de emergencias local (911 en los [place]) en cualquiera de los siguientes casos:  · No puede caminar  · Tiene dolor de pecho o dificultad para respirar que es peor que cuando se acostó  · De repente se siente mareado y tiene dificultad para respirar  · Le viene repentinamente un nuevo dolor en el pecho  Es probable que sienta más dolor cuando respira profundo o tose  · Usted expectora jamey  Regrese a la momo de emergencias si:  · Se siente desmayar o confundido  · Reynolds piel se ha puesto bebo o jose miguel  · Reynolds pierna se siente cálida, sensible y [place]  Puede que estén inflamados y rojos  Llame a reynolds médico si:  · Tiene fiebre o se siente más cansado de lo normal     · Las venas en amara piernas se jennifer más grandes de lo normal  Se podrían notar llenas o estar abultadas  · Amara piernas le pican o se sienten pesadas  · Tiene áreas o llagas haynes o marbella en amara piernas  Reynolds piel podría parecer con hoyuelos o podría tener hendiduras  · Está subiendo de [place]  · Tiene dificultad para  los tobillos  · La inflamación no desaparece, u otras partes de reynolds cuerpo se hinchan  · Usted tiene preguntas o inquietudes acerca de reynolds condición o cuidado  Cuidados personales:  · Eleve amara piernas  Levante amara piernas por encima del nivel de reynolds corazón tan seguido nelson pueda  La [place] va a disminuir inflamación y el dolor   Coloque amara piernas sobre almohadas o cobijas para mantenerlas Byron Landau cómodamente  · Use medias de compresión, si se lo indican  Estas medias apretadas generan presión en nereyda piernas para promover la circulación de jamey y prevenir un coágulo de [place]  Colóqueselas antes de levantarse de la cama  Use las medias krista el día  No las use al dormir  · Manténgase activo  No esté de pie o sentado por [employer]  Pregunte a reynolds médico acerca del mejor plan de ejercicio para usted  · Consuma alimentos saludables  Los alimentos saludables incluyen frutas, verduras, pan integral, productos lácteos bajos en grasa, frijoles, mari magras y pescado  Pregunte si necesita seguir chris dieta especial          · Limite el consumo de sodio (tia)  La tia hará que reynolds cuerpo retenga aún más líquidos  El médico le dirá cuántos miligramos (mg) de sal puede consumir cada día  Acuda a la consulta de control con reynolds médico según las indicaciones: Anote nereyda preguntas para que se acuerde de hacerlas krista nereyda visitas  © Copyright [employer] 2021 Information is for End User's use only and may not be sold, redistributed or otherwise used for commercial purposes  All illustrations and images included in CareNotes® are the copyrighted property of [employer]  or [address] [place] [address] es sólo para uso en educación  Reynolds intención no es darle un consejo médico sobre enfermedades o tratamientos  Colsulte con reynolds Bebe Yoni farmacéutico antes de seguir cualquier régimen médico para saber si es seguro y efectivo para usted

## 2022-01-25 NOTE — PROGRESS NOTES
Assessment/Plan:    Hypertension  - Blood pressure has been elevated, however patient has been without her medications for the past 2 months  - Will restart amlodipine 5 mg daily and hydrochlorothiazide 25 mg daily   - Reviewed BP target goal with patient  - Continue to maintain healthy balanced diet with focus on low salt intake  Limit alcohol intake  - Advised to exercise at least 30 minutes a day for at least 5 days out of the week  Diagnoses and all orders for this visit:    Essential hypertension  -     amLODIPine (NORVASC) 5 mg tablet; Take 1 tablet (5 mg total) by mouth daily  -     hydrochlorothiazide (HYDRODIURIL) 25 mg tablet; Take 1 tablet (25 mg total) by mouth daily    Class 3 severe obesity due to excess calories without serious comorbidity with body mass index (BMI) of 40 0 to 44 9 in [facility])          All of patients questions were answered  Patient understands and agrees with the above plan  Return in about 4 weeks (around 2/22/2022) for Next scheduled follow up HTN with new PCP  Manual FIDEL Haile  01/25/22  [facility] YUAN Melinda          Subjective:     Patient ID: Caron Augustin  is a 64 y o  female with known PHM of   Hypertension, tobacco dependence, chronic back pain who presents today in office for same day visit for high blood pressure  - Patient is a 64 y o  female who presents today for  Same-day visit for high blood pressure  Patient notes she went to have blood drawn, but was unable to due to high blood pressure  Patient notes on the right arm her blood pressure was 180/112 and on the left arm it was 172/110  Patient notes she has been experiencing intermittent headaches and dizziness along with some leg swelling  Patient denies any chest pain  Patient notes she usually takes amlodipine and hydrochlorothiazide, but has been without these medications for the past 2 months        The following portions of the patient's history were reviewed and updated as appropriate: allergies, current medications, past family history, past medical history, past social history, past surgical history and problem list         Review of Systems   Constitutional: Negative for chills and fever  HENT: Negative for congestion and sore throat  Eyes: Negative for visual disturbance  Respiratory: Negative for cough, chest tightness and shortness of breath  Cardiovascular: Positive for leg swelling  Negative for chest pain and palpitations  Neurological: Positive for dizziness (Occasionally) and headaches (Occasionally)  BMI Counseling: Body mass index is 40 81 kg/m²  The BMI is above normal  Nutrition recommendations include decreasing portion sizes, encouraging healthy choices of fruits and vegetables, consuming healthier snacks, limiting drinks that contain sugar, moderation in carbohydrate intake, increasing intake of lean protein and reducing intake of cholesterol  Exercise recommendations include moderate physical activity 150 minutes/week  No pharmacotherapy was ordered  Rationale for BMI follow-up plan is due to patient being overweight or obese  Objective:   Vitals:    01/25/22 1310   BP: 144/100   BP Location: Right arm   Patient Position: Sitting   Cuff Size: Large   Pulse: 82   Resp: 16   Temp: 98 °F (36 7 °C)   TempSrc: Temporal   SpO2: 95%   Weight: 98 kg (216 lb)   Height: 5' 1" (1 549 m)         Physical Exam  Vitals and nursing note reviewed  Constitutional:       General: She is not in acute distress  Appearance: She is well-developed  HENT:      Head: Normocephalic and atraumatic  Right Ear: External ear normal       Left Ear: External ear normal       Nose: Nose normal    Eyes:      Conjunctiva/sclera: Conjunctivae normal    Cardiovascular:      Rate and Rhythm: Normal rate and regular rhythm  Pulses: Normal pulses  Heart sounds: Normal heart sounds     Pulmonary:      Effort: Pulmonary effort is normal  No respiratory distress  Breath sounds: Normal breath sounds  No wheezing  Musculoskeletal:         General: No swelling  Cervical back: Normal range of motion and neck supple  Skin:     General: Skin is warm and dry  Neurological:      Mental Status: She is alert and oriented to person, place, and time  Psychiatric:         Behavior: Behavior normal            - Utilized [facility] services for translation

## 2022-03-15 ENCOUNTER — OFFICE VISIT (OUTPATIENT)
Dept: FAMILY MEDICINE CLINIC | Facility: CLINIC | Age: 57
End: 2022-03-15

## 2022-03-15 VITALS
TEMPERATURE: 98 F | OXYGEN SATURATION: 95 % | SYSTOLIC BLOOD PRESSURE: 130 MMHG | RESPIRATION RATE: 16 BRPM | DIASTOLIC BLOOD PRESSURE: 94 MMHG | BODY MASS INDEX: 39.84 KG/M2 | HEIGHT: 61 IN | WEIGHT: 211 LBS | HEART RATE: 98 BPM

## 2022-03-15 DIAGNOSIS — Z12.11 ENCOUNTER FOR SCREENING COLONOSCOPY: ICD-10-CM

## 2022-03-15 DIAGNOSIS — I10 ESSENTIAL HYPERTENSION: Primary | ICD-10-CM

## 2022-03-15 DIAGNOSIS — L84 CALLUS OF FOOT: ICD-10-CM

## 2022-03-15 PROCEDURE — 3080F DIAST BP >= 90 MM HG: CPT | Performed by: FAMILY MEDICINE

## 2022-03-15 PROCEDURE — 3075F SYST BP GE 130 - 139MM HG: CPT | Performed by: FAMILY MEDICINE

## 2022-03-15 PROCEDURE — 99213 OFFICE O/P EST LOW 20 MIN: CPT | Performed by: FAMILY MEDICINE

## 2022-03-15 NOTE — ASSESSMENT & PLAN NOTE
Plan  - Controlled:  Today's bp= 130/94 (Goal bp as outlined by [facility] <140/90 due to DM)  - Current medication= amlodipine 5mg daily, HCTZ 25mg daily  - Lipid Panel, CBC, CMP ordered  - ASCVD Score to be calculated after lipid results  - Lifestyle modifications (decrease salt intake, increase vegetables/fruits in diet, exercise for a minimum of 40min a day for 3-4 days a week, decrease smoking, weight loss)  - Advised to go to emergency room if alarming symptoms occur (severe headache, visual changes, dizziness, sudden weakness or numbness, chest pain)

## 2022-03-15 NOTE — PROGRESS NOTES
Assessment/Plan:    Essential hypertension  Plan  - Controlled: Today's bp= 130/94 (Goal bp as outlined by [facility] <140/90 due to DM)  - Current medication= amlodipine 5mg daily, HCTZ 25mg daily  - Lipid Panel, CBC, CMP ordered  - ASCVD Score to be calculated after lipid results  - Lifestyle modifications (decrease salt intake, increase vegetables/fruits in diet, exercise for a minimum of 40min a day for 3-4 days a week, decrease smoking, weight loss)  - Advised to go to emergency room if alarming symptoms occur (severe headache, visual changes, dizziness, sudden weakness or numbness, chest pain)      Foot callus  Pt with bilateral foot calluses that cause significant pain with walking  Pictures in media folder  Plan  - Schedule appointment for callus removal asap       Diagnoses and all orders for this visit:    Essential hypertension  -     Lipid Panel with Direct LDL reflex; Future  -     CBC and differential; Future  -     Comprehensive metabolic panel; Future    Callus of foot    Encounter for screening colonoscopy  -     Ambulatory Referral to General Surgery; Future          Subjective:      Patient ID: Laura Rangel is a 64 y o  female  Pt comes in for htn follow up and to establish care  Pt also states she has 3 calluses that cause her discomfort on her left foot that she would like to have looked at  They have been present for the past 2 months and are located on left lateral aspect of left big toe and on bottom of left foot  More calluses are also present on the lateral aspect of right foot  Pt notices pain when she walks (specifically on left foot), 8/10 on pain scale  No trauma nor signs of infection  Has previously tried to remove callus with hands and has attempted to use warm water to soften skin but nothing has helped        The following portions of the patient's history were reviewed and updated as appropriate: allergies, current medications, past family history, past medical history, past social history, past surgical history and problem list     Review of Systems   Constitutional: Negative for chills and fever  HENT: Negative for ear pain and sore throat  Eyes: Negative for pain and visual disturbance  Respiratory: Negative for cough and shortness of breath  Cardiovascular: Negative for chest pain and palpitations  Gastrointestinal: Positive for blood in stool (when really constipated) and constipation  Negative for abdominal pain, diarrhea, nausea and vomiting  Genitourinary: Negative for dysuria, hematuria and vaginal bleeding  Musculoskeletal: Positive for back pain  Negative for arthralgias  Skin: Negative for color change and rash  Neurological: Negative for seizures and syncope  Psychiatric/Behavioral: Negative for suicidal ideas  All other systems reviewed and are negative  Objective:      /94 (BP Location: Left arm, Patient Position: Sitting, Cuff Size: Large)   Pulse 98   Temp 98 °F (36 7 °C) (Temporal)   Resp 16   Ht 5' 1" (1 549 m)   Wt 95 7 kg (211 lb)   SpO2 95%   BMI 39 87 kg/m²          Physical Exam  Vitals and nursing note reviewed  Constitutional:       General: She is not in acute distress  Appearance: Normal appearance  She is obese  She is not ill-appearing, toxic-appearing or diaphoretic  HENT:      Head: Normocephalic and atraumatic  Eyes:      General: No scleral icterus  Right eye: No discharge  Left eye: No discharge  Extraocular Movements: Extraocular movements intact  Conjunctiva/sclera: Conjunctivae normal    Cardiovascular:      Rate and Rhythm: Normal rate and regular rhythm  Pulses: Normal pulses  Dorsalis pedis pulses are 2+ on the right side and 2+ on the left side  Posterior tibial pulses are 2+ on the right side and 2+ on the left side  Heart sounds: Normal heart sounds  No murmur heard  No friction rub  No gallop      Pulmonary:      Effort: Pulmonary effort is normal  No respiratory distress  Breath sounds: Normal breath sounds  No stridor  No wheezing, rhonchi or rales  Musculoskeletal:      Cervical back: Normal range of motion  Right lower leg: No edema  Left lower leg: No edema  Feet:    Feet:      Comments: Skin discoloration consistent with chronic venous insufficiency noted on bilateral ankles  Skin:     General: Skin is warm and dry  Capillary Refill: Capillary refill takes less than 2 seconds  Coloration: Skin is not jaundiced  Neurological:      General: No focal deficit present  Mental Status: She is alert     Psychiatric:         Mood and Affect: Mood normal          Behavior: Behavior normal

## 2022-03-16 NOTE — ASSESSMENT & PLAN NOTE
Pt with bilateral foot calluses that cause significant pain with walking  Pictures in media folder      Plan  - Schedule appointment for callus removal asap

## 2022-03-24 ENCOUNTER — TELEPHONE (OUTPATIENT)
Dept: FAMILY MEDICINE CLINIC | Facility: CLINIC | Age: 57
End: 2022-03-24

## 2022-03-24 ENCOUNTER — PROCEDURE VISIT (OUTPATIENT)
Dept: FAMILY MEDICINE CLINIC | Facility: CLINIC | Age: 57
End: 2022-03-24

## 2022-03-24 VITALS
HEART RATE: 102 BPM | RESPIRATION RATE: 18 BRPM | TEMPERATURE: 98.6 F | DIASTOLIC BLOOD PRESSURE: 82 MMHG | OXYGEN SATURATION: 98 % | HEIGHT: 61 IN | BODY MASS INDEX: 42.29 KG/M2 | SYSTOLIC BLOOD PRESSURE: 126 MMHG | WEIGHT: 224 LBS

## 2022-03-24 DIAGNOSIS — E11.9 TYPE 2 DIABETES MELLITUS WITHOUT COMPLICATION, WITHOUT LONG-TERM CURRENT USE OF INSULIN (HCC): Primary | ICD-10-CM

## 2022-03-24 DIAGNOSIS — R73.03 PREDIABETES: ICD-10-CM

## 2022-03-24 DIAGNOSIS — L84 FOOT CALLUS: ICD-10-CM

## 2022-03-24 LAB — SL AMB POCT HEMOGLOBIN AIC: 6.7 (ref ?–6.5)

## 2022-03-24 PROCEDURE — 83036 HEMOGLOBIN GLYCOSYLATED A1C: CPT | Performed by: FAMILY MEDICINE

## 2022-03-24 PROCEDURE — 3079F DIAST BP 80-89 MM HG: CPT | Performed by: FAMILY MEDICINE

## 2022-03-24 PROCEDURE — 3074F SYST BP LT 130 MM HG: CPT | Performed by: FAMILY MEDICINE

## 2022-03-24 PROCEDURE — 99214 OFFICE O/P EST MOD 30 MIN: CPT | Performed by: FAMILY MEDICINE

## 2022-03-24 NOTE — PROGRESS NOTES
Assessment/Plan:    Foot callus  Extensive calluses on bilateral feet causing pain with walking (pics in the chart); Patient with PMH prediabetes therefore will hold off on procedure today and refer to our colleagues from podiatry  - Referral placed today  Type 2 diabetes mellitus without complication (HCC)  Hx of prediabetes with A1c of 6 1 in April 2020;  A1c today: 6 7 therefore diagnosed with DM2 today  Discussed with patient the meaning of DM2 and the life-style modifications that are required  - Start metformin 500 mg once daily and increase to twice daily with meals in 2 weeks to avoid GI complications  - Referral to podiatry given current hx of foot calluses  - Patient will also need annual physical examination  At that visit, we would discuss DM eye exam as well as other medications such as statin  - Recommended that patient complete blood work prior to next office visit  Diagnoses and all orders for this visit:    Type 2 diabetes mellitus without complication, without long-term current use of insulin (HCC)    Foot callus  -     Cancel: Ambulatory Referral to Podiatry; Future  -     Ambulatory Referral to Podiatry; Future    Prediabetes  -     metFORMIN (GLUCOPHAGE) 500 mg tablet; Take 1 tablet (500 mg total) by mouth 2 (two) times a day with meals  -     POCT hemoglobin A1c          Subjective:      Patient ID: Radha Judge is a 64 y o  female  Patient presents to the clinic for evaluation of bilateral foot calluses  Patient states that she for got that she had a history of prediabetes and has not been treated  She does not recall taking metformin in the past   He denies any current symptoms of hyperglycemia such as polyuria and polydipsia  She feels well otherwise and would like to see a podiatrist due to her foot calyces        The following portions of the patient's history were reviewed and updated as appropriate:   She  has a past medical history of Asthma, Hypertension, and Psychiatric disorder  She   Patient Active Problem List    Diagnosis Date Noted    Acute encephalopathy 2020    Drug overdose 2020    Type 2 diabetes mellitus without complication ([facility] [address] )     Foot callus 03/15/2022    Class 3 severe obesity due to excess calories without serious comorbidity with body mass index (BMI) of 40 0 to 44 9 in adult [facility]) 2021    Chronic pain of both knees 2021    Need for vaccination 10/22/2020    History of prediabetes 10/22/2020    Chronic bilateral low back pain with bilateral sciatica 10/22/2020    Murmur 2020    Therapeutic opioid-induced constipation (OIC) 2020    Transaminitis 2020    History of hepatitis C 2020    Substance abuse ([facility] [address] )     Methadone adverse reaction, initial encounter     Drug-induced constipation 2019    Tobacco dependence 2019    Essential hypertension 2019     She  has a past surgical history that includes Cholecystectomy and  section  Her family history is not on file  She  reports that she has been smoking cigarettes  She has been smoking about 0 50 packs per day  She has never used smokeless tobacco  She reports previous alcohol use  She reports previous drug use  Drugs: Cocaine and Heroin    Current Outpatient Medications   Medication Sig Dispense Refill    amLODIPine (NORVASC) 5 mg tablet Take 1 tablet (5 mg total) by mouth daily 30 tablet 1    docusate sodium (COLACE) 100 mg capsule Take 1 capsule (100 mg total) by mouth 2 (two) times a day as needed for constipation 30 capsule 1    gabapentin (NEURONTIN) 100 mg capsule Take 1 capsule (100 mg total) by mouth daily at bedtime 30 capsule 1    hydrochlorothiazide (HYDRODIURIL) 25 mg tablet Take 1 tablet (25 mg total) by mouth daily 30 tablet 1    metFORMIN (GLUCOPHAGE) 500 mg tablet Take 1 tablet (500 mg total) by mouth 2 (two) times a day with meals 180 tablet 1    naproxen (NAPROSYN) 500 mg tablet Take 1 tablet (500 mg total) by mouth 2 (two) times a day with meals 60 tablet 1    polyethylene glycol (GLYCOLAX) 17 GM/SCOOP powder Take 17 g by mouth daily 850 g 1    sertraline (ZOLOFT) 50 mg tablet Take 1 tablet (50 mg total) by mouth daily 30 tablet 1     No current facility-administered medications for this visit  Current Outpatient Medications on File Prior to Visit   Medication Sig    amLODIPine (NORVASC) 5 mg tablet Take 1 tablet (5 mg total) by mouth daily    docusate sodium (COLACE) 100 mg capsule Take 1 capsule (100 mg total) by mouth 2 (two) times a day as needed for constipation    gabapentin (NEURONTIN) 100 mg capsule Take 1 capsule (100 mg total) by mouth daily at bedtime    hydrochlorothiazide (HYDRODIURIL) 25 mg tablet Take 1 tablet (25 mg total) by mouth daily    naproxen (NAPROSYN) 500 mg tablet Take 1 tablet (500 mg total) by mouth 2 (two) times a day with meals    polyethylene glycol (GLYCOLAX) 17 GM/SCOOP powder Take 17 g by mouth daily    sertraline (ZOLOFT) 50 mg tablet Take 1 tablet (50 mg total) by mouth daily    [DISCONTINUED] metFORMIN (GLUCOPHAGE) 500 mg tablet Take 1 tablet (500 mg total) by mouth daily with breakfast     No current facility-administered medications on file prior to visit  She is allergic to penicillins       Review of Systems   Constitutional: Negative for chills and fever  HENT: Negative for ear pain and sore throat  Eyes: Negative for pain and visual disturbance  Cardiovascular: Negative for palpitations  Genitourinary: Negative for dysuria, hematuria and vaginal bleeding  Skin: Positive for color change (callus on feet)  Negative for rash  Neurological: Negative for seizures and syncope  Psychiatric/Behavioral: Negative for suicidal ideas  All other systems reviewed and are negative          Objective:      /82 (BP Location: Left arm, Patient Position: Sitting, Cuff Size: Large)   Pulse 102   Temp 98 6 °F (37 °C) (Temporal) Resp 18   Ht 5' 1" (1 549 m)   Wt 102 kg (224 lb)   SpO2 98%   BMI 42 32 kg/m²          Physical Exam  Vitals and nursing note reviewed  Constitutional:       General: She is not in acute distress  Appearance: Normal appearance  She is obese  She is not ill-appearing, toxic-appearing or diaphoretic  HENT:      Head: Normocephalic and atraumatic  Eyes:      General: No scleral icterus  Right eye: No discharge  Left eye: No discharge  Extraocular Movements: Extraocular movements intact  Conjunctiva/sclera: Conjunctivae normal    Cardiovascular:      Rate and Rhythm: Normal rate and regular rhythm  Pulses: Normal pulses  Dorsalis pedis pulses are 2+ on the right side and 2+ on the left side  Posterior tibial pulses are 2+ on the right side and 2+ on the left side  Heart sounds: Normal heart sounds  No murmur heard  No friction rub  No gallop  Pulmonary:      Effort: Pulmonary effort is normal  No respiratory distress  Breath sounds: Normal breath sounds  No stridor  No wheezing, rhonchi or rales  Musculoskeletal:      Cervical back: Normal range of motion  Right lower leg: No edema  Left lower leg: No edema  Feet:    Feet:      Comments: Skin discoloration consistent with chronic venous insufficiency noted on bilateral ankles  Skin:     General: Skin is warm and dry  Capillary Refill: Capillary refill takes less than 2 seconds  Coloration: Skin is not jaundiced  Neurological:      General: No focal deficit present  Mental Status: She is alert     Psychiatric:         Mood and Affect: Mood normal          Behavior: Behavior normal

## 2022-03-24 NOTE — ASSESSMENT & PLAN NOTE
Extensive calluses on bilateral feet causing pain with walking (pics in the chart); Patient with PMH prediabetes therefore will hold off on procedure today and refer to our colleagues from podiatry  - Referral placed today

## 2022-03-24 NOTE — PATIENT INSTRUCTIONS
Ejercicios para el robbin   CUIDADO AMBULATORIO:   Los ejercicios para el robbin ayudan a reducir el dolor de robbin y, al [employer], lo fortalecen y mejoran reynolds movimiento  Los ejercicios para el robbin también ayudan a prevenir problemas de robbin a Isa Jet[place]  Lo que necesita saber acerca de los ejercicios para el robbin:  · Quynh los ejercicios a diario o con la frecuencia indicada por reynolds médico     · Muévase lentamente, con cuidado y suavemente  Evite movimientos rápidos o bruscos  · Póngase de pie y siéntese de la forma que reynolds médico le ha [place]  La buena postura puede llegar a reducir reynolds dolor de robbin  Revise reynolds postura con frecuencia, aún cuando no esté haciendo nereyda ejercicios de robbin  Cómo realizar ejercicios para el robbin de manera duffy:  · Posición de ejercicio: Puede sentarse o estar parado mientras realiza los ejercicios para el robbin  Rosio de frente  Los hombros deben estar rectos y [place], con [facility]  · Inclinación de Tomasz Olives y [place] atrás: Incline reynolds elisa suavemente tratando que reynolds mentón toque reynolds pecho  Reynolds médico puede incluso pedirle que empuje la parte de atrás de reynolds robbin para ayudar a inclinar reynolds elisa  Levante reynolds barbilla hasta la posición inicial  Incline reynolds elisa hacia atrás lo más que pueda de [place] que quede mirando hacia el bryson raso  Es posible que reynolds médico le pida que levante el mentón para así ayudar a inclinar reynolds [place] atrás  Regrese reynolds elisa a la posición inicial          · Inclinaciones de elisa, de lado a lado: Incline reynolds elisa de manera que reynolds oreja quede cerca de reynolds hombro  Luego incline reynolds elisa hacia reynolds otro hombro  · Giros de elisa: Gire reynolds elisa nelson si fuera a mirar sobre el hombro  Incline reynolds mentón hacia abajo y trate de que toque reynolds hombro  No levante reynolds hombro hasta que toque reynolds mentón  Rosio de frente otra vez  Quynh lo mismo del otro lado           · Giros de elisa: [place], andrese la barbilla hacia el pecho  A continuación, gire la elisa hacia la derecha  La oreja debe quedar ubicada por encima del hombro  Mantenga esta posición por 5 segundos  Gire la elisa otra vez hacia el pecho y, [place], hacia la izquierda a la misma posición  Sostenga está posición por 5 segundos  Con cuidado, gire la elisa hacia atrás en círculo en el sentido de las manecillas del [place] [address] y repita 3 veces  A continuación, mueve la elisa en la dirección contraria (en el sentido contrario de las manecillas del reloj) en círculo 3 veces  No encoja los hombros hacia arriba mientras realiza ant ejercicio  Comuníquese con reynolds médico si:  · Reynolds dolor no mejora o Mary Lou Kelly  · Usted tiene preguntas o inquietudes acerca de reynolds afección, cuidado o programa de ejercicios  © Copyright [employer] 2022 Information is for End User's use only and may not be sold, redistributed or otherwise used for commercial purposes  All illustrations and images included in CareNotes® are the copyrighted property of [employer]  or [address] [place] [address] es sólo para uso en educación  Reynolds intención no es darle un consejo médico sobre enfermedades o tratamientos  Colsulte con reynolds Stephanie Antis farmacéutico antes de seguir cualquier régimen médico para saber si es seguro y efectivo para usted

## 2022-03-24 NOTE — ASSESSMENT & PLAN NOTE
Hx of prediabetes with A1c of 6 1 in April 2020;  A1c today: 6 7 therefore diagnosed with DM2 today  Discussed with patient the meaning of DM2 and the life-style modifications that are required  - Start metformin 500 mg once daily and increase to twice daily with meals in 2 weeks to avoid GI complications  - Referral to podiatry given current hx of foot calluses  - Patient will also need annual physical examination  At that visit, we would discuss DM eye exam as well as other medications such as statin  - Recommended that patient complete blood work prior to next office visit

## 2022-03-24 NOTE — TELEPHONE ENCOUNTER
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

## 2022-04-20 ENCOUNTER — VBI (OUTPATIENT)
Dept: ADMINISTRATIVE | Facility: OTHER | Age: 57
End: 2022-04-20

## 2022-08-05 ENCOUNTER — APPOINTMENT (OUTPATIENT)
Dept: LAB | Facility: HOSPITAL | Age: 57
End: 2022-08-05
Payer: COMMERCIAL

## 2022-08-05 ENCOUNTER — OFFICE VISIT (OUTPATIENT)
Dept: FAMILY MEDICINE CLINIC | Facility: CLINIC | Age: 57
End: 2022-08-05

## 2022-08-05 VITALS
SYSTOLIC BLOOD PRESSURE: 136 MMHG | BODY MASS INDEX: 39.08 KG/M2 | WEIGHT: 207 LBS | TEMPERATURE: 97.6 F | RESPIRATION RATE: 18 BRPM | DIASTOLIC BLOOD PRESSURE: 84 MMHG | OXYGEN SATURATION: 98 % | HEIGHT: 61 IN | HEART RATE: 74 BPM

## 2022-08-05 DIAGNOSIS — E11.9 TYPE 2 DIABETES MELLITUS WITHOUT COMPLICATION, WITHOUT LONG-TERM CURRENT USE OF INSULIN (HCC): ICD-10-CM

## 2022-08-05 DIAGNOSIS — I10 ESSENTIAL HYPERTENSION: ICD-10-CM

## 2022-08-05 DIAGNOSIS — I10 ESSENTIAL HYPERTENSION: Primary | ICD-10-CM

## 2022-08-05 DIAGNOSIS — Z12.31 SCREENING MAMMOGRAM FOR BREAST CANCER: ICD-10-CM

## 2022-08-05 DIAGNOSIS — Z12.11 COLON CANCER SCREENING: ICD-10-CM

## 2022-08-05 PROBLEM — Z87.898 HISTORY OF PREDIABETES: Status: RESOLVED | Noted: 2020-10-22 | Resolved: 2022-08-05

## 2022-08-05 LAB
ALBUMIN SERPL BCP-MCNC: 4 G/DL (ref 3.5–5)
ALP SERPL-CCNC: 106 U/L (ref 43–122)
ALT SERPL W P-5'-P-CCNC: 19 U/L
ANION GAP SERPL CALCULATED.3IONS-SCNC: 2 MMOL/L (ref 5–14)
AST SERPL W P-5'-P-CCNC: 22 U/L (ref 14–36)
BASOPHILS # BLD AUTO: 0.02 THOUSANDS/ΜL (ref 0–0.1)
BASOPHILS NFR BLD AUTO: 0 % (ref 0–1)
BILIRUB SERPL-MCNC: 0.33 MG/DL (ref 0.2–1)
BUN SERPL-MCNC: 11 MG/DL (ref 5–25)
CALCIUM SERPL-MCNC: 8.8 MG/DL (ref 8.4–10.2)
CHLORIDE SERPL-SCNC: 105 MMOL/L (ref 96–108)
CHOLEST SERPL-MCNC: 164 MG/DL
CO2 SERPL-SCNC: 35 MMOL/L (ref 21–32)
CREAT SERPL-MCNC: 0.72 MG/DL (ref 0.6–1.2)
EOSINOPHIL # BLD AUTO: 0.21 THOUSAND/ΜL (ref 0–0.61)
EOSINOPHIL NFR BLD AUTO: 4 % (ref 0–6)
ERYTHROCYTE [DISTWIDTH] IN BLOOD BY AUTOMATED COUNT: 13.2 % (ref 11.6–15.1)
GFR SERPL CREATININE-BSD FRML MDRD: 93 ML/MIN/1.73SQ M
GLUCOSE P FAST SERPL-MCNC: 101 MG/DL (ref 70–99)
HCT VFR BLD AUTO: 44.6 % (ref 34.8–46.1)
HDLC SERPL-MCNC: 26 MG/DL
HGB BLD-MCNC: 13.3 G/DL (ref 11.5–15.4)
IMM GRANULOCYTES # BLD AUTO: 0.01 THOUSAND/UL (ref 0–0.2)
IMM GRANULOCYTES NFR BLD AUTO: 0 % (ref 0–2)
LDLC SERPL CALC-MCNC: 106 MG/DL
LYMPHOCYTES # BLD AUTO: 2.14 THOUSANDS/ΜL (ref 0.6–4.47)
LYMPHOCYTES NFR BLD AUTO: 41 % (ref 14–44)
MCH RBC QN AUTO: 25 PG (ref 26.8–34.3)
MCHC RBC AUTO-ENTMCNC: 29.8 G/DL (ref 31.4–37.4)
MCV RBC AUTO: 84 FL (ref 82–98)
MONOCYTES # BLD AUTO: 0.35 THOUSAND/ΜL (ref 0.17–1.22)
MONOCYTES NFR BLD AUTO: 7 % (ref 4–12)
NEUTROPHILS # BLD AUTO: 2.55 THOUSANDS/ΜL (ref 1.85–7.62)
NEUTS SEG NFR BLD AUTO: 48 % (ref 43–75)
NRBC BLD AUTO-RTO: 0 /100 WBCS
PLATELET # BLD AUTO: 187 THOUSANDS/UL (ref 149–390)
PMV BLD AUTO: 9.6 FL (ref 8.9–12.7)
POTASSIUM SERPL-SCNC: 4.5 MMOL/L (ref 3.5–5.3)
PROT SERPL-MCNC: 8.8 G/DL (ref 6.4–8.4)
RBC # BLD AUTO: 5.31 MILLION/UL (ref 3.81–5.12)
SODIUM SERPL-SCNC: 142 MMOL/L (ref 135–147)
TRIGL SERPL-MCNC: 161 MG/DL
WBC # BLD AUTO: 5.28 THOUSAND/UL (ref 4.31–10.16)

## 2022-08-05 PROCEDURE — 80061 LIPID PANEL: CPT

## 2022-08-05 PROCEDURE — 80053 COMPREHEN METABOLIC PANEL: CPT

## 2022-08-05 PROCEDURE — 3075F SYST BP GE 130 - 139MM HG: CPT | Performed by: FAMILY MEDICINE

## 2022-08-05 PROCEDURE — 3079F DIAST BP 80-89 MM HG: CPT | Performed by: FAMILY MEDICINE

## 2022-08-05 PROCEDURE — 36415 COLL VENOUS BLD VENIPUNCTURE: CPT

## 2022-08-05 PROCEDURE — 99213 OFFICE O/P EST LOW 20 MIN: CPT | Performed by: FAMILY MEDICINE

## 2022-08-05 PROCEDURE — 85025 COMPLETE CBC W/AUTO DIFF WBC: CPT

## 2022-08-05 RX ORDER — AMLODIPINE BESYLATE 5 MG/1
5 TABLET ORAL DAILY
Qty: 30 TABLET | Refills: 1 | Status: SHIPPED | OUTPATIENT
Start: 2022-08-05

## 2022-08-05 RX ORDER — HYDROCHLOROTHIAZIDE 25 MG/1
25 TABLET ORAL DAILY
Qty: 30 TABLET | Refills: 1 | Status: SHIPPED | OUTPATIENT
Start: 2022-08-05

## 2022-08-05 NOTE — PATIENT INSTRUCTIONS
Hypertension   WHAT YOU NEED TO KNOW:   Hypertension is high blood pressure  Your blood pressure is the force of your blood moving against the walls of your arteries  Hypertension causes your blood pressure to get so high that your heart has to work much harder than normal  This can damage your heart  The cause of hypertension may not be known  This is called essential or primary hypertension  Hypertension caused by another medical condition, such as kidney disease, is called secondary hypertension  DISCHARGE INSTRUCTIONS:   Call your local emergency number (911 in the [address] [place] [address],[address]) or have someone call if:   You have chest pain  You have any of the following signs of a heart attack:      Squeezing, pressure, or pain in your chest    You may  also have any of the following:     Discomfort or pain in your back, neck, jaw, stomach, or arm    Shortness of breath    Nausea or vomiting    Lightheadedness or a sudden cold sweat    You become confused or have trouble speaking  You suddenly feel lightheaded or have trouble breathing  Return to the emergency department if:   You have a severe headache or vision loss  You have weakness in an arm or leg  Call your doctor or cardiologist if:   You feel faint, dizzy, confused, or drowsy  You have been taking your blood pressure medicine but your pressure is higher than your provider says it should be  You have questions or concerns about your condition or care  Medicines: You may  need any of the following:  Antihypertensives  may be used to help lower your blood pressure  Several kinds of medicines are available  Your healthcare provider will choose medicines based on the kind of hypertension you have  You may need more than one type of medicine  Take the medicine exactly as directed  Diuretics  help decrease extra fluid that collects in your body  This will help lower your blood pressure   You may urinate more often while you take this medicine  Cholesterol medicine  helps lower your cholesterol level  A low cholesterol level helps prevent heart disease and makes it easier to control your blood pressure  Take your medicine as directed  Contact your healthcare provider if you think your medicine is not helping or if you have side effects  Tell him or her if you are allergic to any medicine  Keep a list of the medicines, vitamins, and herbs you take  Include the amounts, and when and why you take them  Bring the list or the pill bottles to follow-up visits  Carry your medicine list with you in case of an emergency  Follow up with your doctor or cardiologist as directed: You will need to return to have your blood pressure checked and to have other lab tests done  Write down your questions so you remember to ask them during your visits  Stages of hypertension:       Normal blood pressure is 119/79 or lower   Your healthcare provider may only check your blood pressure each year if it stays at a normal level  Elevated blood pressure is 120/79 to 129/79   This is sometimes called prehypertension  Your healthcare provider may suggest lifestyle changes to help lower your blood pressure to a normal level  He or she may then check it again in 3 to 6 months  Stage 1 hypertension is 130/80  to 139/89   Your provider may recommend lifestyle changes, medication, and checks every 3 to 6 months until your blood pressure is controlled  Stage 2 hypertension is 140/90 or higher   Your provider will recommend lifestyle changes and have you take 2 kinds of hypertension medicines  You will also need to have your blood pressure checked monthly until it is controlled  Manage hypertension:   Check your blood pressure at home  Avoid smoking, caffeine, and exercise at least 30 minutes before checking your blood pressure  Sit and rest for 5 minutes before you take your blood pressure  Extend your arm and support it on a flat surface   Your arm should be at the same level as your heart  Follow the directions that came with your blood pressure monitor  Check your blood pressure 2 times, 1 minute apart, before you take your medicine in the morning  Also check your blood pressure before your evening meal  Keep a record of your readings and bring it to your follow-up visits  Ask your healthcare provider what your blood pressure should be  Manage any other health conditions you have  Health conditions such as diabetes can increase your risk for hypertension  Follow your healthcare provider's instructions and take all your medicines as directed  Ask about all medicines  Certain medicines can increase your blood pressure  Examples include oral birth control pills, decongestants, herbal supplements, and NSAIDs, such as ibuprofen  Your healthcare provider can tell you which medicines are safe for you to take  This includes prescription and over-the-counter medicines  Lifestyle changes you can make to manage hypertension:  Your healthcare provider may recommend you work with a team to manage hypertension  The team may include medical experts such as a dietitian, an exercise or physical therapist, and a behavior therapist  Your family members may be included in helping you create lifestyle changes  Limit sodium (salt) as directed  Too much sodium can affect your fluid balance  Check labels to find low-sodium or no-salt-added foods  Some low-sodium foods use potassium salts for flavor  Too much potassium can also cause health problems  Your healthcare provider will tell you how much sodium and potassium are safe for you to have in a day  He or she may recommend that you limit sodium to 2,300 mg a day  Follow the meal plan recommended by your healthcare provider  A dietitian or your provider can give you more information on low-sodium plans or the DASH (Dietary Approaches to Stop Hypertension) eating plan   The DASH plan is low in sodium, processed sugar, unhealthy fats, and total fat  It is high in potassium, calcium, and fiber  These can be found in vegetables, fruit, and whole-grain foods  Be physically active throughout the day  Physical activity, such as exercise, can help control your blood pressure and your weight  Be physically active for at least 30 minutes per day, on most days of the week  Include aerobic activity, such as walking or riding a bicycle  Also include strength training at least 2 times each week  Your healthcare providers can help you create a physical activity plan  Decrease stress  This may help lower your blood pressure  Learn ways to relax, such as deep breathing or listening to music  Limit alcohol as directed  Alcohol can increase your blood pressure  A drink of alcohol is 12 ounces of beer, 5 ounces of wine, or 1½ ounces of liquor  Do not smoke  Nicotine and other chemicals in cigarettes and cigars can increase your blood pressure and also cause lung damage  Ask your healthcare provider for information if you currently smoke and need help to quit  E-cigarettes or smokeless tobacco still contain nicotine  Talk to your healthcare provider before you use these products  © Copyright [employer] [facility] 2022 Information is for End User's use only and may not be sold, redistributed or otherwise used for commercial purposes  All illustrations and images included in CareNotes® are the copyrighted property of [employer]  or Rachell Austin  The above information is an  only  It is not intended as medical advice for individual conditions or treatments  Talk to your doctor, nurse or pharmacist before following any medical regimen to see if it is safe and effective for you

## 2022-08-05 NOTE — PROGRESS NOTES
Assessment/Plan:     Essential hypertension  -     /84, at goal per [facility] 8 <140/90  -     Currently on amLODIPine (NORVASC) 5 mg tablet; Take 1 tablet (5 mg total) by mouth daily, will continue  -     Continue hydrochlorothiazide (HYDRODIURIL) 25 mg tablet; Take 1 tablet (25 mg total) by mouth daily         -   Encourage health balance diet, low salt, exercise as tolerated  -    Encourage home blood pressure monitor   - Encourage smoking cessation  -    Follow up in 3 months with PCP    Screening mammogram for breast cancer  -     Mammo screening bilateral w 3d & cad; Future    Colon cancer screening  -     Ambulatory referral for colonoscopy; Future    Other orders/medication refill  -     metFORMIN (GLUCOPHAGE) 500 mg tablet; Take 1 tablet (500 mg total) by mouth 2 (two) times a day with meals  -     IRIS Diabetic eye exam    Subjective:     Patient ID: Stephany Shay is a 64 y o  female  HPI  Patient is a 65 yo F with PMHx of HTN, chronic back pain, Diabetes, Tobacco dependence who present to the office for follow up visit  Patient has no acute complaints  She is accompanied by her mother  Patient BP is at goal in office today  She report being complaint with her medications  She denies headache, dizziness, visual changes, chest pain, palpitation, abdominal pain, N/V/D  Review of Systems   Constitutional: Negative for chills and fever  HENT: Negative for congestion, ear pain and sore throat  Eyes: Negative for pain and visual disturbance  Respiratory: Negative for cough, chest tightness, shortness of breath and wheezing  Cardiovascular: Negative for chest pain and palpitations  Gastrointestinal: Negative for abdominal pain, blood in stool, constipation, diarrhea, nausea and vomiting  Genitourinary: Negative for dysuria, flank pain and hematuria  Musculoskeletal: Negative for arthralgias and back pain  Skin: Negative for color change and rash     Neurological: Negative for seizures and syncope  All other systems reviewed and are negative  Objective:     Physical Exam  Constitutional:       General: She is not in acute distress  Appearance: Normal appearance  She is obese  She is not ill-appearing  HENT:      Head: Normocephalic and atraumatic  Nose: No congestion  Eyes:      Pupils: Pupils are equal, round, and reactive to light  Cardiovascular:      Rate and Rhythm: Normal rate and regular rhythm  Pulses: Normal pulses  Heart sounds: Normal heart sounds  Pulmonary:      Effort: Pulmonary effort is normal  No respiratory distress  Breath sounds: Normal breath sounds  No stridor  No wheezing or rhonchi  Chest:      Chest wall: No tenderness  Abdominal:      General: Bowel sounds are normal       Palpations: Abdomen is soft  Tenderness: There is no abdominal tenderness  There is no guarding  Musculoskeletal:         General: No swelling or tenderness  Normal range of motion  Cervical back: Neck supple  Right lower leg: No edema  Left lower leg: No edema  Skin:     General: Skin is warm and dry  Capillary Refill: Capillary refill takes less than 2 seconds  Findings: No lesion or rash  Neurological:      General: No focal deficit present  Mental Status: She is alert and oriented to person, place, and time     Psychiatric:         Mood and Affect: Mood normal

## 2022-11-15 ENCOUNTER — OFFICE VISIT (OUTPATIENT)
Dept: FAMILY MEDICINE CLINIC | Facility: CLINIC | Age: 57
End: 2022-11-15

## 2022-11-15 VITALS
RESPIRATION RATE: 18 BRPM | BODY MASS INDEX: 40.59 KG/M2 | HEIGHT: 61 IN | SYSTOLIC BLOOD PRESSURE: 142 MMHG | TEMPERATURE: 96.6 F | WEIGHT: 215 LBS | HEART RATE: 75 BPM | DIASTOLIC BLOOD PRESSURE: 84 MMHG | OXYGEN SATURATION: 98 %

## 2022-11-15 DIAGNOSIS — F17.219 CIGARETTE NICOTINE DEPENDENCE WITH NICOTINE-INDUCED DISORDER: ICD-10-CM

## 2022-11-15 DIAGNOSIS — Z11.1 SCREENING-PULMONARY TB: ICD-10-CM

## 2022-11-15 DIAGNOSIS — Z12.31 ENCOUNTER FOR SCREENING MAMMOGRAM FOR BREAST CANCER: ICD-10-CM

## 2022-11-15 DIAGNOSIS — F17.200 TOBACCO DEPENDENCE: ICD-10-CM

## 2022-11-15 DIAGNOSIS — J45.20 MILD INTERMITTENT ASTHMA, UNSPECIFIED WHETHER COMPLICATED: ICD-10-CM

## 2022-11-15 DIAGNOSIS — Z23 ENCOUNTER FOR IMMUNIZATION: ICD-10-CM

## 2022-11-15 DIAGNOSIS — I10 ESSENTIAL HYPERTENSION: ICD-10-CM

## 2022-11-15 DIAGNOSIS — E11.9 TYPE 2 DIABETES MELLITUS WITHOUT COMPLICATION, WITHOUT LONG-TERM CURRENT USE OF INSULIN (HCC): Primary | ICD-10-CM

## 2022-11-15 DIAGNOSIS — E66.01 CLASS 3 SEVERE OBESITY DUE TO EXCESS CALORIES WITHOUT SERIOUS COMORBIDITY WITH BODY MASS INDEX (BMI) OF 40.0 TO 44.9 IN ADULT (HCC): ICD-10-CM

## 2022-11-15 PROBLEM — G93.40 ACUTE ENCEPHALOPATHY: Status: RESOLVED | Noted: 2020-04-04 | Resolved: 2022-11-15

## 2022-11-15 PROBLEM — Z86.19 HISTORY OF HEPATITIS C: Status: RESOLVED | Noted: 2020-04-04 | Resolved: 2022-11-15

## 2022-11-15 PROBLEM — K59.03 THERAPEUTIC OPIOID-INDUCED CONSTIPATION (OIC): Status: RESOLVED | Noted: 2020-04-05 | Resolved: 2022-11-15

## 2022-11-15 PROBLEM — T40.2X5A THERAPEUTIC OPIOID-INDUCED CONSTIPATION (OIC): Status: RESOLVED | Noted: 2020-04-05 | Resolved: 2022-11-15

## 2022-11-15 PROBLEM — L84 FOOT CALLUS: Status: RESOLVED | Noted: 2022-03-15 | Resolved: 2022-11-15

## 2022-11-15 PROBLEM — K59.03 DRUG-INDUCED CONSTIPATION: Status: RESOLVED | Noted: 2019-09-06 | Resolved: 2022-11-15

## 2022-11-15 PROBLEM — T50.901A DRUG OVERDOSE: Status: RESOLVED | Noted: 2020-04-04 | Resolved: 2022-11-15

## 2022-11-15 PROBLEM — R74.01 TRANSAMINITIS: Status: RESOLVED | Noted: 2020-04-04 | Resolved: 2022-11-15

## 2022-11-15 LAB
CREAT UR-MCNC: 135 MG/DL
LEFT EYE DIABETIC RETINOPATHY: NORMAL
LEFT EYE IMAGE QUALITY: NORMAL
LEFT EYE MACULAR EDEMA: NORMAL
LEFT EYE OTHER RETINOPATHY: NORMAL
MICROALBUMIN UR-MCNC: 204 MG/L (ref 0–20)
MICROALBUMIN/CREAT 24H UR: 151 MG/G CREATININE (ref 0–30)
RIGHT EYE DIABETIC RETINOPATHY: NORMAL
RIGHT EYE IMAGE QUALITY: NORMAL
RIGHT EYE MACULAR EDEMA: NORMAL
RIGHT EYE OTHER RETINOPATHY: NORMAL
SEVERITY (EYE EXAM): NORMAL
SL AMB POCT HEMOGLOBIN AIC: 6.5 (ref ?–6.5)

## 2022-11-15 RX ORDER — NICOTINE 21 MG/24HR
1 PATCH, TRANSDERMAL 24 HOURS TRANSDERMAL EVERY 24 HOURS
Qty: 28 PATCH | Refills: 12 | Status: SHIPPED | OUTPATIENT
Start: 2022-11-15

## 2022-11-15 RX ORDER — ALBUTEROL SULFATE 90 UG/1
2 AEROSOL, METERED RESPIRATORY (INHALATION) EVERY 6 HOURS PRN
Qty: 18 G | Refills: 5 | Status: SHIPPED | OUTPATIENT
Start: 2022-11-15

## 2022-11-15 RX ORDER — AMLODIPINE BESYLATE 5 MG/1
5 TABLET ORAL DAILY
Qty: 90 TABLET | Refills: 2 | Status: SHIPPED | OUTPATIENT
Start: 2022-11-15

## 2022-11-15 RX ORDER — HYDROCHLOROTHIAZIDE 25 MG/1
25 TABLET ORAL DAILY
Qty: 90 TABLET | Refills: 2 | Status: SHIPPED | OUTPATIENT
Start: 2022-11-15

## 2022-11-15 RX ORDER — POLYETHYLENE GLYCOL 3350 17 G
2 POWDER IN PACKET (EA) ORAL AS NEEDED
Qty: 100 EACH | Refills: 0 | Status: SHIPPED | OUTPATIENT
Start: 2022-11-15

## 2022-11-15 NOTE — ASSESSMENT & PLAN NOTE
Tobacco Cessation Counseling: Tobacco cessation counseling and education was provided  The patient is sincerely urged to quit consumption of tobacco  She is ready to quit tobacco  The numerous health risks of tobacco consumption were discussed  Prescribed the following medications: nicotine patch and nicotine lozenge

## 2022-11-15 NOTE — ASSESSMENT & PLAN NOTE
BP Readings from Last 3 Encounters:   11/15/22 142/84   08/05/22 136/84   03/24/22 126/82     -reviewed eating a low salt diet (such as the DASH diet), limiting alcohol, exercising, and wt loss    -Continue amlodipine 5 mg & hctz 25 mg daily  -Recommend checking home BPs   -Consider adjusting meds if BP remains above 140/90 at next OV

## 2022-11-15 NOTE — ASSESSMENT & PLAN NOTE
BMI Counseling: Body mass index is 40 62 kg/m²  The BMI is above normal  Nutrition recommendations include reducing portion sizes, decreasing overall calorie intake, 3-5 servings of fruits/vegetables daily, reducing fast food intake, consuming healthier snacks, decreasing soda and/or juice intake, moderation in carbohydrate intake, increasing intake of lean protein, reducing intake of saturated fat and trans fat and reducing intake of cholesterol  Exercise recommendations include moderate aerobic physical activity for 150 minutes/week

## 2022-11-15 NOTE — PROGRESS NOTES
Name: Stephany Shay      : 1965      MRN: [id number]  Encounter Provider: Daryl Bumpers, CRNP  Encounter Date: 11/15/2022   Encounter department: [address] [place] [address]     1  Type 2 diabetes mellitus without complication, without long-term current use of insulin [facility])  Assessment & Plan:    Lab Results   Component Value Date    HGBA1C 6 5 11/15/2022   -Continue with metformin 500 mg bid    Orders:  -     POCT hemoglobin A1c  -     Ambulatory Referral to Podiatry; Future  -     IRIS Diabetic eye exam  -     metFORMIN (GLUCOPHAGE) 500 mg tablet; Take 1 tablet (500 mg total) by mouth 2 (two) times a day with meals  -     Microalbumin / creatinine urine ratio ([facility])    2  Screening-pulmonary TB  -     Quantiferon TB Gold Plus; Future    3  Mild intermittent asthma, unspecified whether complicated  -     albuterol (Ventolin HFA) 90 mcg/act inhaler; Inhale 2 puffs every 6 (six) hours as needed for wheezing    4  Cigarette nicotine dependence with nicotine-induced disorder  -     nicotine (NICODERM CQ) 21 mg/24 hr TD 24 hr patch; Place 1 patch on the skin every 24 hours  -     nicotine polacrilex (COMMIT) 2 MG lozenge; Apply 1 lozenge (2 mg total) to the mouth or throat as needed for smoking cessation    5  Encounter for immunization  -     Pneumococcal Conjugate Vaccine 20-valent (PCV20)  -     Age 15 y+, BOOSTER (BIVALENT): Scottu 78 vac bivalent lizbeth-sucr  -     influenza vaccine, quadrivalent, recombinant, PF, 0 5 mL, for patients 18 yr+ (FLUBLOK)    6  Encounter for screening mammogram for breast cancer  -     Mammo screening bilateral w 3d & cad; Future; Expected date: 11/15/2022    7  Essential hypertension  Assessment & Plan:  BP Readings from Last 3 Encounters:   11/15/22 142/84   22 136/84   22 126/82     -reviewed eating a low salt diet (such as the DASH diet), limiting alcohol, exercising, and wt loss    -Continue amlodipine 5 mg & hctz 25 mg daily  -Recommend checking home BPs   -Consider adjusting meds if BP remains above 140/90 at next OV    Orders:  -     amLODIPine (NORVASC) 5 mg tablet; Take 1 tablet (5 mg total) by mouth daily  -     hydrochlorothiazide (HYDRODIURIL) 25 mg tablet; Take 1 tablet (25 mg total) by mouth daily    8  Class 3 severe obesity due to excess calories without serious comorbidity with body mass index (BMI) of 40 0 to 44 9 in adult [facility])  Assessment & Plan:  BMI Counseling: Body mass index is 40 62 kg/m²  The BMI is above normal  Nutrition recommendations include reducing portion sizes, decreasing overall calorie intake, 3-5 servings of fruits/vegetables daily, reducing fast food intake, consuming healthier snacks, decreasing soda and/or juice intake, moderation in carbohydrate intake, increasing intake of lean protein, reducing intake of saturated fat and trans fat and reducing intake of cholesterol  Exercise recommendations include moderate aerobic physical activity for 150 minutes/week  9  Tobacco dependence  Assessment & Plan:  Tobacco Cessation Counseling: Tobacco cessation counseling and education was provided  The patient is sincerely urged to quit consumption of tobacco  She is ready to quit tobacco  The numerous health risks of tobacco consumption were discussed  Prescribed the following medications: nicotine patch and nicotine lozenge  Pipo Kee is a 62 y o  female  has a past medical history of Asthma, Hypertension, and Psychiatric disorder  has a past surgical history that includes Cholecystectomy and  section  She presents today to follow-up up on HTN/HLD/DM  She denies any acute complaints but she is requesting a TB test for her job  Review of Systems   Constitutional: Negative for chills and fever  HENT: Negative for ear pain and sore throat  Eyes: Negative for pain and visual disturbance     Respiratory: Negative for cough and shortness of breath  Cardiovascular: Negative for chest pain and palpitations  Gastrointestinal: Negative for abdominal pain and vomiting  Genitourinary: Negative for dysuria and hematuria  Musculoskeletal: Negative for arthralgias and back pain  Skin: Negative for color change and rash  Neurological: Negative for seizures and syncope  All other systems reviewed and are negative  Current Outpatient Medications on File Prior to Visit   Medication Sig   • docusate sodium (COLACE) 100 mg capsule Take 1 capsule (100 mg total) by mouth 2 (two) times a day as needed for constipation   • gabapentin (NEURONTIN) 100 mg capsule Take 1 capsule (100 mg total) by mouth daily at bedtime   • naproxen (NAPROSYN) 500 mg tablet Take 1 tablet (500 mg total) by mouth 2 (two) times a day with meals   • polyethylene glycol (GLYCOLAX) 17 GM/SCOOP powder Take 17 g by mouth daily   • [DISCONTINUED] amLODIPine (NORVASC) 5 mg tablet Take 1 tablet (5 mg total) by mouth daily   • [DISCONTINUED] hydrochlorothiazide (HYDRODIURIL) 25 mg tablet Take 1 tablet (25 mg total) by mouth daily   • [DISCONTINUED] metFORMIN (GLUCOPHAGE) 500 mg tablet Take 1 tablet (500 mg total) by mouth 2 (two) times a day with meals   • [DISCONTINUED] sertraline (ZOLOFT) 50 mg tablet Take 1 tablet (50 mg total) by mouth daily       Objective     /84 (BP Location: Left arm, Patient Position: Sitting, Cuff Size: Adult)   Pulse 75   Temp (!) 96 6 °F (35 9 °C) (Temporal)   Resp 18   Ht 5' 1" (1 549 m)   Wt 97 5 kg (215 lb)   LMP 06/27/2019 (Approximate)   SpO2 98%   BMI 40 62 kg/m²     Physical Exam  Vitals and nursing note reviewed  Constitutional:       Appearance: She is obese  HENT:      Head: Normocephalic and atraumatic  Right Ear: External ear normal       Left Ear: External ear normal       Nose: Nose normal    Eyes:      Extraocular Movements: Extraocular movements intact        Conjunctiva/sclera: Conjunctivae normal  Pupils: Pupils are equal, round, and reactive to light  Cardiovascular:      Rate and Rhythm: Normal rate and regular rhythm  Pulses: Normal pulses  Heart sounds: Normal heart sounds  Pulmonary:      Effort: Pulmonary effort is normal       Breath sounds: Normal breath sounds  Abdominal:      General: Bowel sounds are normal       Palpations: Abdomen is soft  Tenderness: There is no abdominal tenderness  Musculoskeletal:         General: Normal range of motion  Cervical back: Normal range of motion  Skin:     General: Skin is warm and dry  Neurological:      General: No focal deficit present  Mental Status: She is alert and oriented to person, place, and time  Mental status is at baseline  Psychiatric:         Mood and Affect: Mood normal          Behavior: Behavior normal          Thought Content:  Thought content normal        JULIO Jolley

## 2022-11-29 ENCOUNTER — APPOINTMENT (OUTPATIENT)
Dept: LAB | Facility: HOSPITAL | Age: 57
End: 2022-11-29

## 2022-11-29 DIAGNOSIS — Z11.1 SCREENING-PULMONARY TB: ICD-10-CM

## 2022-11-30 LAB
GAMMA INTERFERON BACKGROUND BLD IA-ACNC: 0.05 IU/ML
M TB IFN-G BLD-IMP: NEGATIVE
M TB IFN-G CD4+ BCKGRND COR BLD-ACNC: 0 IU/ML
M TB IFN-G CD4+ BCKGRND COR BLD-ACNC: 0 IU/ML
MITOGEN IGNF BCKGRD COR BLD-ACNC: >10 IU/ML

## 2022-12-02 ENCOUNTER — VBI (OUTPATIENT)
Dept: ADMINISTRATIVE | Facility: OTHER | Age: 57
End: 2022-12-02

## 2023-02-15 ENCOUNTER — HOSPITAL ENCOUNTER (OUTPATIENT)
Dept: RADIOLOGY | Facility: HOSPITAL | Age: 58
Discharge: HOME | End: 2023-02-15

## 2023-02-15 ENCOUNTER — OFFICE VISIT (OUTPATIENT)
Dept: FAMILY MEDICINE CLINIC | Facility: CLINIC | Age: 58
End: 2023-02-15

## 2023-02-15 ENCOUNTER — APPOINTMENT (OUTPATIENT)
Dept: LAB | Facility: HOSPITAL | Age: 58
End: 2023-02-15

## 2023-02-15 VITALS
BODY MASS INDEX: 39.65 KG/M2 | HEART RATE: 98 BPM | DIASTOLIC BLOOD PRESSURE: 80 MMHG | WEIGHT: 210 LBS | SYSTOLIC BLOOD PRESSURE: 120 MMHG | OXYGEN SATURATION: 94 % | TEMPERATURE: 98.7 F | HEIGHT: 61 IN | RESPIRATION RATE: 16 BRPM
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
## 2023-02-15 DIAGNOSIS — E11.9 TYPE 2 DIABETES MELLITUS WITHOUT COMPLICATION, WITHOUT LONG-TERM CURRENT USE OF INSULIN (HCC): ICD-10-CM

## 2023-02-15 DIAGNOSIS — M79.605 PAIN OF LEFT LOWER EXTREMITY: ICD-10-CM
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
## 2023-02-15 DIAGNOSIS — E66.01 CLASS 3 SEVERE OBESITY DUE TO EXCESS CALORIES WITHOUT SERIOUS COMORBIDITY WITH BODY MASS INDEX (BMI) OF 40.0 TO 44.9 IN ADULT (HCC): ICD-10-CM

## 2023-02-15 DIAGNOSIS — Z23 ENCOUNTER FOR IMMUNIZATION: ICD-10-CM

## 2023-02-15 DIAGNOSIS — I10 ESSENTIAL HYPERTENSION: ICD-10-CM

## 2023-02-15 DIAGNOSIS — Z12.11 COLON CANCER SCREENING: ICD-10-CM

## 2023-02-15 LAB
ANION GAP SERPL CALCULATED.3IONS-SCNC: 8 MMOL/L (ref 5–14)
BUN SERPL-MCNC: 15 MG/DL (ref 5–25)
CALCIUM SERPL-MCNC: 9.1 MG/DL (ref 8.4–10.2)
CHLORIDE SERPL-SCNC: 101 MMOL/L (ref 96–108)
CHOLEST SERPL-MCNC: 171 MG/DL
CO2 SERPL-SCNC: 34 MMOL/L (ref 21–32)
CREAT SERPL-MCNC: 0.74 MG/DL (ref 0.6–1.2)
EST. AVERAGE GLUCOSE BLD GHB EST-MCNC: 140 MG/DL
GFR SERPL CREATININE-BSD FRML MDRD: 90 ML/MIN/1.73SQ M
GLUCOSE P FAST SERPL-MCNC: 82 MG/DL (ref 70–99)
HBA1C MFR BLD: 6.5 %
HDLC SERPL-MCNC: 31 MG/DL
LDLC SERPL CALC-MCNC: 103 MG/DL
POTASSIUM SERPL-SCNC: 4.1 MMOL/L (ref 3.5–5.3)
SODIUM SERPL-SCNC: 143 MMOL/L (ref 135–147)
TRIGL SERPL-MCNC: 185 MG/DL

## 2023-02-15 RX ORDER — ACETAMINOPHEN 500 MG
500 TABLET ORAL EVERY 6 HOURS PRN
Qty: 60 TABLET | Refills: 1 | Status: SHIPPED | OUTPATIENT
Start: 2023-02-15

## 2023-02-15 NOTE — ASSESSMENT & PLAN NOTE
BP Readings from Last 3 Encounters:   02/15/23 120/80   11/15/22 142/84   08/05/22 136/84     - At goal  Continue amlodipine 5 mg & hctz 25 mg daily  -reviewed eating a low salt diet (such as the DASH diet), limiting alcohol, exercising, and wt loss

## 2023-02-15 NOTE — ASSESSMENT & PLAN NOTE
Lab Results   Component Value Date    HGBA1C 6 5 11/15/2022    - Advised patient to complete blood work the week prior to next OV   - Continue metformin 500 mg bid

## 2023-02-15 NOTE — ASSESSMENT & PLAN NOTE
- Will do an XR given hx of fall  - Advised patient to discontinue using slides and advised her to wear comfortable, closed toe shoes to prevent future falls   PT would also help strengthening

## 2023-02-15 NOTE — ASSESSMENT & PLAN NOTE
BMI Counseling: Body mass index is 39 68 kg/m²  The BMI is above normal  Nutrition recommendations include reducing portion sizes, decreasing overall calorie intake, 3-5 servings of fruits/vegetables daily, reducing fast food intake, consuming healthier snacks, decreasing soda and/or juice intake, moderation in carbohydrate intake, increasing intake of lean protein, reducing intake of saturated fat and trans fat and reducing intake of cholesterol  Exercise recommendations include moderate aerobic physical activity for 150 minutes/week

## 2023-02-15 NOTE — PROGRESS NOTES
Name: Дмитрий De Los Santos      : 1965      MRN: [id number]  Encounter Provider: JULIO Young  Encounter Date: 2/15/2023   Encounter department: [address] [place] [address]     1  Fall, initial encounter  -     XR tibia fibula 2 vw left; Future; Expected date: 02/15/2023  -     Ambulatory Referral to Physical Therapy; Future    2  Pain of left lower extremity  Assessment & Plan:  - Will do an XR given hx of fall  - Advised patient to discontinue using slides and advised her to wear comfortable, closed toe shoes to prevent future falls  PT would also help strengthening     Orders:  -     acetaminophen (TYLENOL) 500 mg tablet; Take 1 tablet (500 mg total) by mouth every 6 (six) hours as needed for mild pain  -     Ambulatory Referral to Physical Therapy; Future    3  Type 2 diabetes mellitus without complication, without long-term current use of insulin [facility])  Assessment & Plan:    Lab Results   Component Value Date    HGBA1C 6 5 11/15/2022    - Advised patient to complete blood work the week prior to next OV   - Continue metformin 500 mg bid    Orders:  -     Hemoglobin A1C; Future  -     Basic metabolic panel; Future  -     Lipid Panel with Direct LDL reflex; Future    4  Colon cancer screening  -     Ambulatory referral for colonoscopy; Future    5  Encounter for immunization  -     Age 15 y+, BOOSTER (BIVALENT): Cris Thomas vac bivalent lizbeth-sucr    6  Essential hypertension  Assessment & Plan:  BP Readings from Last 3 Encounters:   02/15/23 120/80   11/15/22 142/84   22 136/84     - At goal  Continue amlodipine 5 mg & hctz 25 mg daily  -reviewed eating a low salt diet (such as the DASH diet), limiting alcohol, exercising, and wt loss  7  Class 3 severe obesity due to excess calories without serious comorbidity with body mass index (BMI) of 40 0 to 44 9 in adult [facility])  Assessment & Plan:  BMI Counseling: Body mass index is 39 68 kg/m²   The BMI is above normal  Nutrition recommendations include reducing portion sizes, decreasing overall calorie intake, 3-5 servings of fruits/vegetables daily, reducing fast food intake, consuming healthier snacks, decreasing soda and/or juice intake, moderation in carbohydrate intake, increasing intake of lean protein, reducing intake of saturated fat and trans fat and reducing intake of cholesterol  Exercise recommendations include moderate aerobic physical activity for 150 minutes/week  Depression Screening and Follow-up Plan: Patient was screened for depression during today's encounter  They screened negative with a PHQ-2 score of 1  Subjective      Sindy Valdez is a 62 y o  female  has a past medical history of Asthma, Drug overdose, History of hepatitis C, Hypertension, Methadone adverse reaction, initial encounter, Psychiatric disorder, and Substance abuse ([address] )  has a past surgical history that includes Cholecystectomy and  section  She presents today for HTN/HLD/DM follow-up  She reports that she has tripped and fallen 3x over the last week  She wears slides and it appears as though she has tripped over her slides  She has not hit her head any of the times that she has fallen  However, since, she last fell about 3 days ago, she has had a painful and slightly swollen left lower leg which is tender to touch  She did fall onto the tibia portion of her leg  She has not tried anything for this  She is able to ambulate without difficulty but not without pain  Review of Systems   Constitutional: Negative for chills and fever  HENT: Negative for ear pain and sore throat  Eyes: Negative for pain and visual disturbance  Respiratory: Negative for cough and shortness of breath  Cardiovascular: Positive for leg swelling  Negative for chest pain and palpitations  Gastrointestinal: Negative for abdominal pain and vomiting  Genitourinary: Negative for dysuria and hematuria  Musculoskeletal: Positive for myalgias  Negative for arthralgias and back pain  Skin: Negative for color change and rash  Neurological: Negative for seizures and syncope  All other systems reviewed and are negative  Current Outpatient Medications on File Prior to Visit   Medication Sig   • albuterol (Ventolin HFA) 90 mcg/act inhaler Inhale 2 puffs every 6 (six) hours as needed for wheezing   • amLODIPine (NORVASC) 5 mg tablet Take 1 tablet (5 mg total) by mouth daily   • docusate sodium (COLACE) 100 mg capsule Take 1 capsule (100 mg total) by mouth 2 (two) times a day as needed for constipation   • gabapentin (NEURONTIN) 100 mg capsule Take 1 capsule (100 mg total) by mouth daily at bedtime   • hydrochlorothiazide (HYDRODIURIL) 25 mg tablet Take 1 tablet (25 mg total) by mouth daily   • metFORMIN (GLUCOPHAGE) 500 mg tablet Take 1 tablet (500 mg total) by mouth 2 (two) times a day with meals   • naproxen (NAPROSYN) 500 mg tablet Take 1 tablet (500 mg total) by mouth 2 (two) times a day with meals   • nicotine (NICODERM CQ) 21 mg/24 hr TD 24 hr patch Place 1 patch on the skin every 24 hours   • nicotine polacrilex (COMMIT) 2 MG lozenge Apply 1 lozenge (2 mg total) to the mouth or throat as needed for smoking cessation   • polyethylene glycol (GLYCOLAX) 17 GM/SCOOP powder Take 17 g by mouth daily       Objective     /80 (BP Location: Left arm, Patient Position: Sitting, Cuff Size: Standard)   Pulse 98   Temp 98 7 °F (37 1 °C) (Temporal)   Resp 16   Ht 5' 1" (1 549 m)   Wt 95 3 kg (210 lb)   LMP 06/27/2019 (Approximate)   SpO2 94%   Breastfeeding No   BMI 39 68 kg/m²     Physical Exam  Vitals and nursing note reviewed  Constitutional:       Appearance: She is obese  HENT:      Head: Normocephalic and atraumatic        Right Ear: External ear normal       Left Ear: External ear normal       Nose: Nose normal       Mouth/Throat:      Mouth: Mucous membranes are moist       Pharynx: Oropharynx is clear  Eyes:      Extraocular Movements: Extraocular movements intact  Conjunctiva/sclera: Conjunctivae normal       Pupils: Pupils are equal, round, and reactive to light  Cardiovascular:      Rate and Rhythm: Normal rate and regular rhythm  Pulses: Normal pulses  Heart sounds: Normal heart sounds  Pulmonary:      Effort: Pulmonary effort is normal       Breath sounds: Normal breath sounds  Abdominal:      General: Bowel sounds are normal       Palpations: Abdomen is soft  Tenderness: There is no abdominal tenderness  Musculoskeletal:         General: Normal range of motion  Cervical back: Normal range of motion  Right lower leg: Normal       Left lower leg: Swelling and tenderness present  No deformity or lacerations  Skin:     General: Skin is warm and dry  Neurological:      General: No focal deficit present  Mental Status: She is alert and oriented to person, place, and time  Mental status is at baseline  Psychiatric:         Mood and Affect: Mood normal          Behavior: Behavior normal          Thought Content:  Thought content normal          Judgment: Judgment normal        Arnulfo Ryan

## 2023-02-22 ENCOUNTER — VBI (OUTPATIENT)
Dept: ADMINISTRATIVE | Facility: OTHER | Age: 58
End: 2023-02-22

## 2023-07-06 ENCOUNTER — HOSPITAL ENCOUNTER (EMERGENCY)
Facility: HOSPITAL | Age: 58
Discharge: HOME | End: 2023-07-06
Attending: EMERGENCY MEDICINE
Payer: COMMERCIAL

## 2023-07-06 ENCOUNTER — APPOINTMENT (EMERGENCY)
Dept: RADIOLOGY | Facility: HOSPITAL | Age: 58
End: 2023-07-06
Payer: COMMERCIAL

## 2023-07-06 VITALS
RESPIRATION RATE: 16 BRPM | TEMPERATURE: 97.3 F | SYSTOLIC BLOOD PRESSURE: 160 MMHG | DIASTOLIC BLOOD PRESSURE: 93 MMHG | OXYGEN SATURATION: 97 % | HEART RATE: 83 BPM

## 2023-07-06 DIAGNOSIS — M79.601 RIGHT ARM PAIN: Primary | ICD-10-CM

## 2023-07-06 LAB
ANION GAP SERPL CALCULATED.3IONS-SCNC: 2 MMOL/L
BASOPHILS # BLD AUTO: 0.02 THOUSANDS/ÂΜL (ref 0–0.1)
BASOPHILS NFR BLD AUTO: 0 % (ref 0–1)
BUN SERPL-MCNC: 16 MG/DL (ref 5–25)
CALCIUM SERPL-MCNC: 8.9 MG/DL (ref 8.3–10.1)
CHLORIDE SERPL-SCNC: 111 MMOL/L (ref 96–108)
CO2 SERPL-SCNC: 22 MMOL/L (ref 21–32)
CREAT SERPL-MCNC: 0.81 MG/DL (ref 0.6–1.3)
D DIMER PPP FEU-MCNC: 0.43 UG/ML FEU
EOSINOPHIL # BLD AUTO: 0.33 THOUSAND/ÂΜL (ref 0–0.61)
EOSINOPHIL NFR BLD AUTO: 6 % (ref 0–6)
ERYTHROCYTE [DISTWIDTH] IN BLOOD BY AUTOMATED COUNT: 13.4 % (ref 11.6–15.1)
GFR SERPL CREATININE-BSD FRML MDRD: 80 ML/MIN/1.73SQ M
GLUCOSE SERPL-MCNC: 153 MG/DL (ref 65–140)
GLUCOSE SERPL-MCNC: 164 MG/DL (ref 65–140)
HCT VFR BLD AUTO: 41 % (ref 34.8–46.1)
HGB BLD-MCNC: 12.5 G/DL (ref 11.5–15.4)
IMM GRANULOCYTES # BLD AUTO: 0.02 THOUSAND/UL (ref 0–0.2)
IMM GRANULOCYTES NFR BLD AUTO: 0 % (ref 0–2)
LYMPHOCYTES # BLD AUTO: 1.97 THOUSANDS/ÂΜL (ref 0.6–4.47)
LYMPHOCYTES NFR BLD AUTO: 33 % (ref 14–44)
MCH RBC QN AUTO: 25.8 PG (ref 26.8–34.3)
MCHC RBC AUTO-ENTMCNC: 30.5 G/DL (ref 31.4–37.4)
MCV RBC AUTO: 85 FL (ref 82–98)
MONOCYTES # BLD AUTO: 0.46 THOUSAND/ÂΜL (ref 0.17–1.22)
MONOCYTES NFR BLD AUTO: 8 % (ref 4–12)
NEUTROPHILS # BLD AUTO: 3.17 THOUSANDS/ÂΜL (ref 1.85–7.62)
NEUTS SEG NFR BLD AUTO: 53 % (ref 43–75)
NRBC BLD AUTO-RTO: 0 /100 WBCS
PLATELET # BLD AUTO: 197 THOUSANDS/UL (ref 149–390)
PMV BLD AUTO: 10 FL (ref 8.9–12.7)
POTASSIUM SERPL-SCNC: 4.6 MMOL/L (ref 3.5–5.3)
RBC # BLD AUTO: 4.84 MILLION/UL (ref 3.81–5.12)
SODIUM SERPL-SCNC: 135 MMOL/L (ref 135–147)
WBC # BLD AUTO: 5.97 THOUSAND/UL (ref 4.31–10.16)

## 2023-07-06 PROCEDURE — 73090 X-RAY EXAM OF FOREARM: CPT

## 2023-07-06 PROCEDURE — 82948 REAGENT STRIP/BLOOD GLUCOSE: CPT

## 2023-07-06 PROCEDURE — 80048 BASIC METABOLIC PNL TOTAL CA: CPT

## 2023-07-06 PROCEDURE — 73110 X-RAY EXAM OF WRIST: CPT

## 2023-07-06 PROCEDURE — 36415 COLL VENOUS BLD VENIPUNCTURE: CPT

## 2023-07-06 PROCEDURE — 85379 FIBRIN DEGRADATION QUANT: CPT

## 2023-07-06 PROCEDURE — 85025 COMPLETE CBC W/AUTO DIFF WBC: CPT

## 2023-07-06 PROCEDURE — 73080 X-RAY EXAM OF ELBOW: CPT

## 2023-07-06 RX ORDER — KETOROLAC TROMETHAMINE 10 MG/1
10 TABLET, FILM COATED ORAL ONCE
Status: COMPLETED | OUTPATIENT
Start: 2023-07-06 | End: 2023-07-06

## 2023-07-06 RX ORDER — ACETAMINOPHEN 325 MG/1
650 TABLET ORAL ONCE
Status: COMPLETED | OUTPATIENT
Start: 2023-07-06 | End: 2023-07-06

## 2023-07-06 RX ADMIN — KETOROLAC TROMETHAMINE 10 MG: 10 TABLET, FILM COATED ORAL at 19:41

## 2023-07-06 RX ADMIN — ACETAMINOPHEN 650 MG: 325 TABLET ORAL at 17:26

## 2023-07-06 NOTE — ED PROVIDER NOTES
History  Chief Complaint   Patient presents with   • Arm Pain     Pt reports R forearm pain and swelling, pt reports pain started yesterday, unknown duration of swelling; denies fall/injury     HPI  49-year-old woman comes in with pain in her right arm after moving to a new home and lifting boxes on Saturday. She has a past medical history of diabetes mellitus and states that she has not checked her blood glucose in a long time. Prior to Admission Medications   Prescriptions Last Dose Informant Patient Reported?  Taking?   acetaminophen (TYLENOL) 500 mg tablet   No No   Sig: Take 1 tablet (500 mg total) by mouth every 6 (six) hours as needed for mild pain   albuterol (Ventolin HFA) 90 mcg/act inhaler   No No   Sig: Inhale 2 puffs every 6 (six) hours as needed for wheezing   amLODIPine (NORVASC) 5 mg tablet   No No   Sig: Take 1 tablet (5 mg total) by mouth daily   docusate sodium (COLACE) 100 mg capsule   No No   Sig: Take 1 capsule (100 mg total) by mouth 2 (two) times a day as needed for constipation   gabapentin (NEURONTIN) 100 mg capsule   No No   Sig: Take 1 capsule (100 mg total) by mouth daily at bedtime   hydrochlorothiazide (HYDRODIURIL) 25 mg tablet   No No   Sig: Take 1 tablet (25 mg total) by mouth daily   metFORMIN (GLUCOPHAGE) 500 mg tablet   No No   Sig: Take 1 tablet (500 mg total) by mouth 2 (two) times a day with meals   naproxen (NAPROSYN) 500 mg tablet   No No   Sig: Take 1 tablet (500 mg total) by mouth 2 (two) times a day with meals   nicotine (NICODERM CQ) 21 mg/24 hr TD 24 hr patch   No No   Sig: Place 1 patch on the skin every 24 hours   nicotine polacrilex (COMMIT) 2 MG lozenge   No No   Sig: Apply 1 lozenge (2 mg total) to the mouth or throat as needed for smoking cessation   polyethylene glycol (GLYCOLAX) 17 GM/SCOOP powder   No No   Sig: Take 17 g by mouth daily      Facility-Administered Medications: None       Past Medical History:   Diagnosis Date   • Asthma    • Drug overdose 2020   • History of hepatitis C 2020   • Hypertension    • Methadone adverse reaction, initial encounter    • Psychiatric disorder    • Substance abuse [facility])        Past Surgical History:   Procedure Laterality Date   •  SECTION     • CHOLECYSTECTOMY         History reviewed. No pertinent family history. I have reviewed and agree with the history as documented. E-Cigarette/Vaping   • E-Cigarette Use Never User      E-Cigarette/Vaping Substances     Social History     Tobacco Use   • Smoking status: Every Day     Packs/day: 0.50     Types: Cigarettes   • Smokeless tobacco: Never   Vaping Use   • Vaping Use: Never used   Substance Use Topics   • Alcohol use: Not Currently   • Drug use: Not Currently     Types: Cocaine, Heroin        Review of Systems   Musculoskeletal: Positive for arthralgias, joint swelling and myalgias. All other systems reviewed and are negative. Physical Exam  ED Triage Vitals [23 1703]   Temperature Pulse Respirations Blood Pressure SpO2   (!) 97.3 °F (36.3 °C) 83 16 160/93 97 %      Temp Source Heart Rate Source Patient Position - Orthostatic VS BP Location FiO2 (%)   Temporal Monitor Sitting Left arm --      Pain Score       --             Orthostatic Vital Signs  Vitals:    23 1703   BP: 160/93   Pulse: 83   Patient Position - Orthostatic VS: Sitting       Physical Exam  Vitals and nursing note reviewed. Exam conducted with a chaperone present. Constitutional:       Appearance: Normal appearance. She is obese. HENT:      Head: Normocephalic. Eyes:      Extraocular Movements: Extraocular movements intact. Pupils: Pupils are equal, round, and reactive to light. Cardiovascular:      Rate and Rhythm: Normal rate and regular rhythm. Pulses: Normal pulses. Heart sounds: Normal heart sounds. Pulmonary:      Effort: Pulmonary effort is normal.      Breath sounds: Normal breath sounds.    Musculoskeletal:         General: Swelling and tenderness present. Skin:     General: Skin is warm. Neurological:      Mental Status: She is alert. ED Medications  Medications - No data to display    Diagnostic Studies  Results Reviewed     None                 No orders to display         Procedures  Procedures      ED Course  ED Course as of 07/06/23 1937   Thu Jul 06, 2023   1745 POC Glucose(!): 153   1928 D-Dimer, Quant: 0.43                                       Medical Decision Making  Amount and/or Complexity of Data Reviewed  Labs: ordered. Decision-making details documented in ED Course. Radiology: ordered. Risk  OTC drugs. Prescription drug management. 59-year-old woman with arm pain after moving to a new home on Saturday. Her forearm is swollen. D-dimer came back negative ruling out DVT. Infection was ruled out with CBC with normal lymphocyte count. Patient states that the pain was medicated with Toradol injection    Disposition  Final diagnoses:   None     ED Disposition     None      Follow-up Information    None         Patient's Medications   Discharge Prescriptions    No medications on file     No discharge procedures on file. PDMP Review     None           ED Provider  Attending physically available and evaluated Fatoumata Monreal. I managed the patient along with the ED Attending.     Electronically Signed by         Melissa Smith MD  07/06/23 5932

## 2023-07-06 NOTE — ED ATTENDING ATTESTATION
7/6/2023  ICisco MD, saw and evaluated the patient. I have discussed the patient with the resident/non-physician practitioner and agree with the resident's/non-physician practitioner's findings, Plan of Care, and MDM as documented in the resident's/non-physician practitioner's note, except where noted. All available labs and Radiology studies were reviewed. I was present for key portions of any procedure(s) performed by the resident/non-physician practitioner and I was immediately available to provide assistance. At this point I agree with the current assessment done in the Emergency Department. I have conducted an independent evaluation of this patient a history and physical is as follows:    62 y.o. right hand dominant female presenting with right forearm pain. Symptoms started yesterday. No trauma or falls. Patient has not had any weakness or numbness, however, gripping anything in right hand or moving right wrist makes pain worse. Nothing makes pain better. History of DM, no history of DVT. /93 (BP Location: Left arm)   Pulse 83   Temp (!) 97.3 °F (36.3 °C) (Temporal)   Resp 16   LMP 06/27/2019 (Approximate)   SpO2 97%    Constitutional:  Awake, alert, oriented. No acute distress. HEENT:  Normocephalic, atraumatic. Sclera anicteric, conjunctiva not injected. Moist oral mucosa. Cardiac:  Appears well-perfused  Respiratory:  Breathing comfortably on room air  Abdomen:  Nondistended  Extremities: Examination of right upper extremity reveals no edema or erythema or ecchymosis. Right elbow is non-tender with palpation and patient is able to range it without much difficulty. Palpation of both the volar and the dorsal aspect of the right forearm is painful and right wrist AROM is limited by pain. Similarly, patient has pain with flexion and extension of all fingers. SILT in median, radial and ulnar nn distributions. No skin discoloration such as erythema or pallor or cyanosis.  2+ radial pulse on the right. No pain out of proportion to exam.  Integument:  No rashes over exposed areas, cap refill less than 2 seconds  Neurologic:  Awake, alert, and oriented x3. Nonfocal exam.  Psychiatric:  Normal affect    Labs Reviewed   CBC AND DIFFERENTIAL - Abnormal       Result Value Ref Range Status    WBC 5.97  4.31 - 10.16 Thousand/uL Final    RBC 4.84  3.81 - 5.12 Million/uL Final    Hemoglobin 12.5  11.5 - 15.4 g/dL Final    Hematocrit 41.0  34.8 - 46.1 % Final    MCV 85  82 - 98 fL Final    MCH 25.8 (*) 26.8 - 34.3 pg Final    MCHC 30.5 (*) 31.4 - 37.4 g/dL Final    RDW 13.4  11.6 - 15.1 % Final    MPV 10.0  8.9 - 12.7 fL Final    Platelets 543  127 - 390 Thousands/uL Final    nRBC 0  /100 WBCs Final    Neutrophils Relative 53  43 - 75 % Final    Immat GRANS % 0  0 - 2 % Final    Lymphocytes Relative 33  14 - 44 % Final    Monocytes Relative 8  4 - 12 % Final    Eosinophils Relative 6  0 - 6 % Final    Basophils Relative 0  0 - 1 % Final    Neutrophils Absolute 3.17  1.85 - 7.62 Thousands/µL Final    Immature Grans Absolute 0.02  0.00 - 0.20 Thousand/uL Final    Lymphocytes Absolute 1.97  0.60 - 4.47 Thousands/µL Final    Monocytes Absolute 0.46  0.17 - 1.22 Thousand/µL Final    Eosinophils Absolute 0.33  0.00 - 0.61 Thousand/µL Final    Basophils Absolute 0.02  0.00 - 0.10 Thousands/µL Final   POCT GLUCOSE - Abnormal    POC Glucose 153 (*) 65 - 140 mg/dl Final   D-DIMER, QUANTITATIVE - Normal    D-Dimer, Quant 0.43  <0.50 ug/ml FEU Final    Comment: Reference and upper limits to exclude DVT and PE are the same. Do not use to exclude if clinical symptoms are present. Pregnant women:  1st trimester:  <0.22 - 1.06 ug/ml FEU  2nd trimester:  <0.22 - 1.88 ug/ml FEU  3rd trimester:   0.24 - 3.28 ug/ml FEU    Note: Normal ranges may not apply to patients who are transgender, non-binary, or whose legal sex, sex at birth, and gender identity differ. Narrative:      In the evaluation for possible pulmonary embolism, in the appropriate (Well's Score of 4 or less) patient, the age adjusted d-dimer cutoff for this patient can be calculated as:    Age x 0.01 (in ug/mL) for Age-adjusted D-dimer exclusion threshold for a patient over 50 years. XR elbow 3+ views RIGHT   Final Result      No acute osseous abnormality. Workstation performed: [id number]         XR forearm 2 views RIGHT   Final Result      No acute osseous abnormality. Workstation performed: [id number]         XR wrist 3+ views RIGHT   Final Result      No acute osseous abnormality. Workstation performed: [id number]               ED Course     Medications   acetaminophen (TYLENOL) tablet 650 mg (650 mg Oral Given 7/6/23 1726)   ketorolac (TORADOL) tablet 10 mg (10 mg Oral Given 7/6/23 1941)     62 y.o. female presenting with right forearm pain. VS reviewed, hypertensive, WNL otherwise. DDx includes overuse injury, tenosynovitis, occult trauma, soft tissue crystallopathy such as gout (somewhat less likely given absence of history of gout), RUE DVT, infection including deep space infection, versus another etiology. X-ray of right elbow, forearm and wrist to my review without osseous abnormality or gas in tissues. Toradol and tylenol administered for pain. Labs reveal essentially unremarkable BMP and CBC, making infection somewhat less likely. D-dimer is 0.43, making DVT less likely. Recommend monitoring symptoms for any progression. Recommend rest and NSAIDs. May use ice packs PRN. Follow up with PCP and/or orthopedics. Patient discharged to home with recommendations for symptom control, return precautions, and plan for follow up.

## 2023-07-06 NOTE — DISCHARGE INSTRUCTIONS
You have been worked up for arm pain. We have not found any severe cause of the pain. We suggest taking nsaids and following up with your primary care physician.      If you have any new or concerning symptoms please return to the hospital.

## 2023-09-28 ENCOUNTER — VBI (OUTPATIENT)
Dept: ADMINISTRATIVE | Facility: OTHER | Age: 58
End: 2023-09-28

## 2023-10-02 ENCOUNTER — VBI (OUTPATIENT)
Dept: ADMINISTRATIVE | Facility: OTHER | Age: 58
End: 2023-10-02

## 2023-10-04 ENCOUNTER — VBI (OUTPATIENT)
Dept: ADMINISTRATIVE | Facility: OTHER | Age: 58
End: 2023-10-04

## 2024-04-15 ENCOUNTER — APPOINTMENT (OUTPATIENT)
Dept: LAB | Facility: HOSPITAL | Age: 59
End: 2024-04-15
Payer: COMMERCIAL

## 2024-04-15 ENCOUNTER — OFFICE VISIT (OUTPATIENT)
Dept: FAMILY MEDICINE CLINIC | Facility: CLINIC | Age: 59
End: 2024-04-15

## 2024-04-15 VITALS
DIASTOLIC BLOOD PRESSURE: 85 MMHG | TEMPERATURE: 96.2 F | HEART RATE: 80 BPM | RESPIRATION RATE: 16 BRPM | SYSTOLIC BLOOD PRESSURE: 128 MMHG | HEIGHT: 61 IN | WEIGHT: 207 LBS | BODY MASS INDEX: 39.08 KG/M2 | OXYGEN SATURATION: 95 %

## 2024-04-15 DIAGNOSIS — Z00.00 ANNUAL PHYSICAL EXAM: ICD-10-CM

## 2024-04-15 DIAGNOSIS — F17.219 CIGARETTE NICOTINE DEPENDENCE WITH NICOTINE-INDUCED DISORDER: ICD-10-CM

## 2024-04-15 DIAGNOSIS — I10 ESSENTIAL HYPERTENSION: ICD-10-CM

## 2024-04-15 DIAGNOSIS — Z12.31 ENCOUNTER FOR SCREENING MAMMOGRAM FOR MALIGNANT NEOPLASM OF BREAST: ICD-10-CM

## 2024-04-15 DIAGNOSIS — B35.1 ONYCHOMYCOSIS: ICD-10-CM

## 2024-04-15 DIAGNOSIS — F17.200 TOBACCO DEPENDENCE: ICD-10-CM

## 2024-04-15 DIAGNOSIS — M21.611 BUNION, RIGHT FOOT: ICD-10-CM

## 2024-04-15 DIAGNOSIS — Z23 ENCOUNTER FOR IMMUNIZATION: ICD-10-CM

## 2024-04-15 DIAGNOSIS — E11.9 TYPE 2 DIABETES MELLITUS WITHOUT COMPLICATION, WITHOUT LONG-TERM CURRENT USE OF INSULIN (HCC): ICD-10-CM

## 2024-04-15 DIAGNOSIS — L84 CALLUS: ICD-10-CM

## 2024-04-15 DIAGNOSIS — E78.2 MIXED HYPERLIPIDEMIA: ICD-10-CM

## 2024-04-15 DIAGNOSIS — E66.9 OBESITY (BMI 30-39.9): ICD-10-CM

## 2024-04-15 DIAGNOSIS — K59.03 DRUG-INDUCED CONSTIPATION: ICD-10-CM

## 2024-04-15 DIAGNOSIS — F19.11 HX OF SUBSTANCE ABUSE (HCC): ICD-10-CM

## 2024-04-15 DIAGNOSIS — M21.612 BUNION, LEFT FOOT: ICD-10-CM

## 2024-04-15 DIAGNOSIS — E11.9 TYPE 2 DIABETES MELLITUS WITHOUT COMPLICATION, WITHOUT LONG-TERM CURRENT USE OF INSULIN (HCC): Primary | ICD-10-CM

## 2024-04-15 DIAGNOSIS — Z12.11 COLON CANCER SCREENING: ICD-10-CM

## 2024-04-15 LAB
CHOLEST SERPL-MCNC: 158 MG/DL
CREAT UR-MCNC: 120.6 MG/DL
EST. AVERAGE GLUCOSE BLD GHB EST-MCNC: 154 MG/DL
HBA1C MFR BLD: 7 %
HDLC SERPL-MCNC: 31 MG/DL
LDLC SERPL CALC-MCNC: 66 MG/DL (ref 0–100)
MICROALBUMIN UR-MCNC: 59.7 MG/L
MICROALBUMIN/CREAT 24H UR: 50 MG/G CREATININE (ref 0–30)
SL AMB POCT HEMOGLOBIN AIC: 6.6 (ref ?–6.5)
TRIGL SERPL-MCNC: 305 MG/DL

## 2024-04-15 PROCEDURE — 90750 HZV VACC RECOMBINANT IM: CPT

## 2024-04-15 PROCEDURE — 36415 COLL VENOUS BLD VENIPUNCTURE: CPT

## 2024-04-15 PROCEDURE — 3079F DIAST BP 80-89 MM HG: CPT

## 2024-04-15 PROCEDURE — 99214 OFFICE O/P EST MOD 30 MIN: CPT

## 2024-04-15 PROCEDURE — 80061 LIPID PANEL: CPT

## 2024-04-15 PROCEDURE — 83036 HEMOGLOBIN GLYCOSYLATED A1C: CPT

## 2024-04-15 PROCEDURE — 82043 UR ALBUMIN QUANTITATIVE: CPT

## 2024-04-15 PROCEDURE — 90471 IMMUNIZATION ADMIN: CPT

## 2024-04-15 PROCEDURE — 99396 PREV VISIT EST AGE 40-64: CPT

## 2024-04-15 PROCEDURE — 3074F SYST BP LT 130 MM HG: CPT

## 2024-04-15 PROCEDURE — 82570 ASSAY OF URINE CREATININE: CPT

## 2024-04-15 RX ORDER — AMLODIPINE BESYLATE 5 MG/1
5 TABLET ORAL DAILY
Qty: 90 TABLET | Refills: 2 | Status: SHIPPED | OUTPATIENT
Start: 2024-04-15

## 2024-04-15 RX ORDER — DOCUSATE SODIUM 100 MG/1
100 CAPSULE, LIQUID FILLED ORAL 2 TIMES DAILY PRN
Qty: 30 CAPSULE | Refills: 1 | Status: SHIPPED | OUTPATIENT
Start: 2024-04-15

## 2024-04-15 RX ORDER — BLOOD SUGAR DIAGNOSTIC
STRIP MISCELLANEOUS
Qty: 300 EACH | Refills: 3 | Status: SHIPPED | OUTPATIENT
Start: 2024-04-15

## 2024-04-15 RX ORDER — HYDROCHLOROTHIAZIDE 25 MG/1
25 TABLET ORAL DAILY
Qty: 90 TABLET | Refills: 2 | Status: SHIPPED | OUTPATIENT
Start: 2024-04-15

## 2024-04-15 RX ORDER — POLYETHYLENE GLYCOL 3350 17 G
2 POWDER IN PACKET (EA) ORAL AS NEEDED
Qty: 100 EACH | Refills: 0 | Status: SHIPPED | OUTPATIENT
Start: 2024-04-15

## 2024-04-15 RX ORDER — ATORVASTATIN CALCIUM 40 MG/1
40 TABLET, FILM COATED ORAL EVERY EVENING
Qty: 90 TABLET | Refills: 1 | Status: SHIPPED | OUTPATIENT
Start: 2024-04-15 | End: 2024-10-12

## 2024-04-15 RX ORDER — BLOOD-GLUCOSE METER
KIT MISCELLANEOUS
Qty: 1 KIT | Refills: 0 | Status: SHIPPED | OUTPATIENT
Start: 2024-04-15

## 2024-04-15 RX ORDER — LANCETS 33 GAUGE
EACH MISCELLANEOUS
Qty: 300 EACH | Refills: 3 | Status: SHIPPED | OUTPATIENT
Start: 2024-04-15

## 2024-04-15 RX ORDER — POLYETHYLENE GLYCOL 3350 17 G/17G
17 POWDER, FOR SOLUTION ORAL DAILY
Qty: 850 G | Refills: 1 | Status: SHIPPED | OUTPATIENT
Start: 2024-04-15

## 2024-04-15 NOTE — ASSESSMENT & PLAN NOTE
BMI Counseling: Body mass index is 39.11 kg/m². The BMI is above normal. Nutrition recommendations include reducing portion sizes, decreasing overall calorie intake, 3-5 servings of fruits/vegetables daily, reducing fast food intake, consuming healthier snacks, decreasing soda and/or juice intake, moderation in carbohydrate intake, increasing intake of lean protein, reducing intake of saturated fat and trans fat, and reducing intake of cholesterol. Exercise recommendations include moderate aerobic physical activity for 150 minutes/week.

## 2024-04-15 NOTE — PATIENT INSTRUCTIONS
[phone]: mammografia  [phone]: colonoscopia.     Wellness Visit for Adults   AMBULATORY CARE:   A wellness visit  is when you see your healthcare provider to get screened for health problems. Your healthcare provider will also give you advice on how to stay healthy. Write down your questions so you remember to ask them. Ask your healthcare provider how often you should have a wellness visit.  What happens at a wellness visit:  Your healthcare provider will ask about your health, and your family history of health problems. This includes high blood pressure, heart disease, and cancer. He or she will ask if you have symptoms that concern you, if you smoke, and about your mood. You may also be asked about your intake of medicines, supplements, food, and alcohol. Any of the following may be done:  Your weight  will be checked. Your height may also be checked so your body mass index (BMI) can be calculated. Your BMI shows if you are at a healthy weight.    Your blood pressure  and heart rate will be checked. Your temperature may also be checked.    Blood and urine tests  may be done. Blood tests may be done to check your cholesterol levels. Abnormal cholesterol levels increase your risk for heart disease and stroke. You may also need a blood or urine test to check for diabetes if you are at increased risk. Urine tests may be done to look for signs of an infection or kidney disease.    A physical exam  includes checking your heartbeat and lungs with a stethoscope. Your healthcare provider may also check your skin to look for sun damage.    Screening tests  may be recommended. A screening test is done to check for diseases that may not cause symptoms. The screening tests you may need depend on your age, gender, family history, and lifestyle habits. For example, colorectal screening may be recommended if you are 50 years old or older.    Screening tests you need if you are a woman:   A Pap smear  is used to screen  for cervical cancer. Pap smears are usually done every 3 to 5 years depending on your age. You may need them more often if you have had abnormal Pap smear test results in the past. Ask your healthcare provider how often you should have a Pap smear.    A mammogram  is an x-ray of your breasts to screen for breast cancer. Experts recommend mammograms every 2 years starting at age 50 years. You may need a mammogram at age 49 years or younger if you have an increased risk for breast cancer. Talk to your healthcare provider about when you should start having mammograms and how often you need them.    Vaccines you may need:   Get an influenza vaccine  every year. The influenza vaccine protects you from the flu. Several types of viruses cause the flu. The viruses change over time, so new vaccines are made each year.    Get a tetanus-diphtheria (Td) booster vaccine  every 10 years. This vaccine protects you against tetanus and diphtheria. Tetanus is a severe infection that may cause painful muscle spasms and lockjaw. Diphtheria is a severe bacterial infection that causes a thick covering in the back of your mouth and throat.    Get a human papillomavirus (HPV) vaccine  if you are female and aged 19 to 26 or male 19 to 21 and never received it. This vaccine protects you from HPV infection. HPV is the most common infection spread by sexual contact. HPV may also cause vaginal, penile, and anal cancers.    Get a pneumococcal vaccine  if you are aged 65 years or older. The pneumococcal vaccine is an injection given to protect you from pneumococcal disease. Pneumococcal disease is an infection caused by pneumococcal bacteria. The infection may cause pneumonia, meningitis, or an ear infection.    Get a shingles vaccine  if you are 60 or older, even if you have had shingles before. The shingles vaccine is an injection to protect you from the varicella-zoster virus. This is the same virus that causes chickenpox. Shingles is a  painful rash that develops in people who had chickenpox or have been exposed to the virus.    How to eat healthy:  My Plate is a model for planning healthy meals. It shows the types and amounts of foods that should go on your plate. Fruits and vegetables make up about half of your plate, and grains and protein make up the other half. A serving of dairy is included on the side of your plate. The amount of calories and serving sizes you need depends on your age, gender, weight, and height. Examples of healthy foods are listed below:  Eat a variety of vegetables  such as dark green, red, and orange vegetables. You can also include canned vegetables low in sodium (salt) and frozen vegetables without added butter or sauces.    Eat a variety of fresh fruits , canned fruit in 100% juice, frozen fruit, and dried fruit.    Include whole grains.  At least half of the grains you eat should be whole grains. Examples include whole-wheat bread, wheat pasta, brown rice, and whole-grain cereals such as oatmeal.    Eat a variety of protein foods such as seafood (fish and shellfish), lean meat, and poultry without skin (turkey and chicken). Examples of lean meats include pork leg, shoulder, or tenderloin, and beef round, sirloin, tenderloin, and extra lean ground beef. Other protein foods include eggs and egg substitutes, beans, peas, soy products, nuts, and seeds.    Choose low-fat dairy products such as skim or 1% milk or low-fat yogurt, cheese, and cottage cheese.    Limit unhealthy fats  such as butter, hard margarine, and shortening.       Exercise:  Exercise at least 30 minutes per day on most days of the week. Some examples of exercise include walking, biking, dancing, and swimming. You can also fit in more physical activity by taking the stairs instead of the elevator or parking farther away from stores. Include muscle strengthening activities 2 days each week. Regular exercise provides many health benefits. It helps you  manage your weight, and decreases your risk for type 2 diabetes, heart disease, stroke, and high blood pressure. Exercise can also help improve your mood. Ask your healthcare provider about the best exercise plan for you.       General health and safety guidelines:   Do not smoke.  Nicotine and other chemicals in cigarettes and cigars can cause lung damage. Ask your healthcare provider for information if you currently smoke and need help to quit. E-cigarettes or smokeless tobacco still contain nicotine. Talk to your healthcare provider before you use these products.    Limit alcohol.  A drink of alcohol is 12 ounces of beer, 5 ounces of wine, or 1½ ounces of liquor.    Lose weight, if needed.  Being overweight increases your risk of certain health conditions. These include heart disease, high blood pressure, type 2 diabetes, and certain types of cancer.    Protect your skin.  Do not sunbathe or use tanning beds. Use sunscreen with a SPF 15 or higher. Apply sunscreen at least 15 minutes before you go outside. Reapply sunscreen every 2 hours. Wear protective clothing, hats, and sunglasses when you are outside.    Drive safely.  Always wear your seatbelt. Make sure everyone in your car wears a seatbelt. A seatbelt can save your life if you are in an accident. Do not use your cell phone when you are driving. This could distract you and cause an accident. Pull over if you need to make a call or send a text message.    Practice safe sex.  Use latex condoms if are sexually active and have more than one partner. Your healthcare provider may recommend screening tests for sexually transmitted infections (STIs).    Wear helmets, lifejackets, and protective gear.  Always wear a helmet when you ride a bike or motorcycle, go skiing, or play sports that could cause a head injury. Wear protective equipment when you play sports. Wear a lifejacket when you are on a boat or doing water sports.    © Copyright [employer] 2023 Information  is for End User's use only and may not be sold, redistributed or otherwise used for commercial purposes.  The above information is an  only. It is not intended as medical advice for individual conditions or treatments. Talk to your doctor, nurse or pharmacist before following any medical regimen to see if it is safe and effective for you.

## 2024-04-15 NOTE — ASSESSMENT & PLAN NOTE
Lab Results   Component Value Date    HGBA1C 6.6 (A) 04/15/2024       - At goal. Continue   Metformin 500 mg BID

## 2024-04-15 NOTE — ASSESSMENT & PLAN NOTE
BP Readings from Last 3 Encounters:   04/15/24 128/85   07/06/23 160/93   02/15/23 120/80      - At goal. Continue amlodipine 5 mg & hctz 25 mg daily.  -reviewed eating a low salt diet (such as the DASH diet), limiting alcohol, exercising, and wt loss.

## 2024-04-15 NOTE — PROGRESS NOTES
ADULT ANNUAL PHYSICAL  [facility]    NAME: Jeri Chandler  AGE: 58 y.o. SEX: female  : 1965     DATE: 4/15/2024     Assessment and Plan:     Problem List Items Addressed This Visit       Essential hypertension     BP Readings from Last 3 Encounters:   04/15/24 128/85   23 160/93   02/15/23 120/80      - At goal. Continue amlodipine 5 mg & hctz 25 mg daily.  -reviewed eating a low salt diet (such as the DASH diet), limiting alcohol, exercising, and wt loss.         Relevant Medications    amLODIPine (NORVASC) 5 mg tablet    hydroCHLOROthiazide 25 mg tablet    Drug-induced constipation    Relevant Medications    docusate sodium (COLACE) 100 mg capsule    polyethylene glycol (GLYCOLAX) 17 GM/SCOOP powder    Tobacco dependence     Currently smokes 3 cigarettes per day.   Tobacco Cessation Counseling: Tobacco cessation counseling and education was provided. The patient is sincerely urged to quit consumption of tobacco. She is ready to quit tobacco. The numerous health risks of tobacco consumption were discussed. Prescribed the following medications: nicotine patch and nicotine lozenge.           Relevant Medications    nicotine (NICODERM CQ) 7 mg/24hr TD 24 hr patch    nicotine polacrilex (COMMIT) 2 MG lozenge    Obesity (BMI 30-39.9)     BMI Counseling: Body mass index is 39.11 kg/m². The BMI is above normal. Nutrition recommendations include reducing portion sizes, decreasing overall calorie intake, 3-5 servings of fruits/vegetables daily, reducing fast food intake, consuming healthier snacks, decreasing soda and/or juice intake, moderation in carbohydrate intake, increasing intake of lean protein, reducing intake of saturated fat and trans fat, and reducing intake of cholesterol. Exercise recommendations include moderate aerobic physical activity for 150 minutes/week.           Type 2 diabetes mellitus without complication (HCC) -  Primary     Lab Results   Component Value Date    HGBA1C 6.6 (A) 04/15/2024       - At goal. Continue   Metformin 500 mg BID         Relevant Medications    atorvastatin (LIPITOR) 40 mg tablet    Blood Glucose Monitoring Suppl (OneTouch Verio Reflect) w/Device KIT    glucose blood (OneTouch Verio) test strip    OneTouch Delica Lancets 33G MISC    metFORMIN (GLUCOPHAGE) 500 mg tablet    Other Relevant Orders    Diabetic foot exam    POCT hemoglobin A1c (Completed)    Albumin / creatinine urine ratio    Lipid Panel with Direct LDL reflex    Hemoglobin A1C    Hx of substance abuse (HCC)     Currently on methadone, doing well          Other Visit Diagnoses       Encounter for immunization        Relevant Orders    Zoster Vaccine Recombinant IM (Completed)    Encounter for screening mammogram for malignant neoplasm of breast        Relevant Orders    Mammo screening bilateral w 3d & cad    Annual physical exam        Callus        Relevant Medications    ammonium lactate (LAC-HYDRIN) 5 % lotion    Other Relevant Orders    Ambulatory Referral to Podiatry    Cigarette nicotine dependence with nicotine-induced disorder        Relevant Medications    nicotine (NICODERM CQ) 7 mg/24hr TD 24 hr patch    nicotine polacrilex (COMMIT) 2 MG lozenge    Colon cancer screening        Relevant Orders    Cologuard    Ambulatory Referral to Gastroenterology    Mixed hyperlipidemia        Relevant Medications    atorvastatin (LIPITOR) 40 mg tablet    Bunion, left foot        Relevant Orders    Ambulatory Referral to Podiatry    Bunion, right foot        Relevant Orders    Ambulatory Referral to Podiatry    Onychomycosis        Relevant Medications    ammonium lactate (LAC-HYDRIN) 5 % lotion    Other Relevant Orders    Ambulatory Referral to Podiatry            Immunizations and preventive care screenings were discussed with patient today. Appropriate education was printed on patient's after visit summary.    Counseling:  Dental Health:  discussed importance of regular tooth brushing, flossing, and dental visits.  Exercise: the importance of regular exercise/physical activity was discussed. Recommend exercise 3-5 times per week for at least 30 minutes.     BMI Counseling: Body mass index is 39.11 kg/m². The BMI is above normal. Nutrition recommendations include decreasing portion sizes, encouraging healthy choices of fruits and vegetables, decreasing fast food intake, consuming healthier snacks, limiting drinks that contain sugar, moderation in carbohydrate intake, increasing intake of lean protein, reducing intake of saturated and trans fat and reducing intake of cholesterol. Exercise recommendations include moderate physical activity 150 minutes/week. No pharmacotherapy was ordered. Rationale for BMI follow-up plan is due to patient being overweight or obese.     Depression Screening and Follow-up Plan: Patient was screened for depression during today's encounter. They screened negative with a PHQ-2 score of 0.    Tobacco Cessation Counseling: Tobacco cessation counseling was provided. The patient is sincerely urged to quit consumption of tobacco. She is ready to quit tobacco. Medication options and side effects of medication discussed. Patient agreed to medication. Nicotine patch and nicotine gum was prescribed.         Return in about 5 months (around 9/15/2024), or if symptoms worsen or fail to improve, for htn, dm, shingrix.     Chief Complaint:     Chief Complaint   Patient presents with    Annual Exam      History of Present Illness:     Adult Annual Physical   Patient here for a comprehensive physical exam. The patient reports problems - calluses and bunions on bilateral feet which is causing discomfort .    Diet and Physical Activity  Diet/Nutrition: poor diet.   Exercise: no formal exercise.      Depression Screening  PHQ-2/9 Depression Screening    Little interest or pleasure in doing things: 0 - not at all  Feeling down, depressed, or  hopeless: 0 - not at all  PHQ-2 Score: 0  PHQ-2 Interpretation: Negative depression screen       General Health  Sleep: sleeps well and sleeps 6 hours a night .   Hearing: normal - bilateral.  Vision: vision problems: right eyesight is blurry at times and most recent eye exam >1 year ago.   Dental: no dental visits for >1 year, brushes teeth twice daily, and flosses teeth occasionally.       /GYN Health  Follows with gynecology? no   Patient is: postmenopausal       Review of Systems:     Review of Systems   Constitutional:  Negative for chills and fever.   HENT:  Negative for ear pain and sore throat.    Eyes:  Negative for pain and visual disturbance.   Respiratory:  Negative for cough and shortness of breath.    Cardiovascular:  Negative for chest pain and palpitations.   Gastrointestinal:  Positive for constipation. Negative for abdominal pain and vomiting.   Genitourinary:  Negative for dysuria and hematuria.   Musculoskeletal:  Positive for myalgias. Negative for arthralgias and back pain.   Skin:  Negative for color change and rash.   Neurological:  Negative for seizures and syncope.   All other systems reviewed and are negative.     Past Medical History:     Past Medical History:   Diagnosis Date    Asthma     Drug overdose 2020    History of hepatitis C 2020    Hypertension     Methadone adverse reaction, initial encounter     Psychiatric disorder     Substance abuse (HCC)       Past Surgical History:     Past Surgical History:   Procedure Laterality Date     SECTION      CHOLECYSTECTOMY        Social History:     Social History     Socioeconomic History    Marital status: Single     Spouse name: None    Number of children: None    Years of education: None    Highest education level: None   Occupational History    None   Tobacco Use    Smoking status: Every Day     Current packs/day: 0.50     Types: Cigarettes    Smokeless tobacco: Never   Vaping Use    Vaping status: Never Used    Substance and Sexual Activity    Alcohol use: Not Currently    Drug use: Not Currently     Types: Cocaine, Heroin    Sexual activity: Not Currently   Other Topics Concern    None   Social History Narrative    None     Social Determinants of Health     Financial Resource Strain: Low Risk  (4/15/2024)    Overall Financial Resource Strain (CARDIA)     Difficulty of Paying Living Expenses: Not hard at all   Food Insecurity: No Food Insecurity (4/15/2024)    Hunger Vital Sign     Worried About Running Out of Food in the Last Year: Never true     Ran Out of Food in the Last Year: Never true   Transportation Needs: No Transportation Needs (4/15/2024)    PRAPARE - Transportation     Lack of Transportation (Medical): No     Lack of Transportation (Non-Medical): No   Physical Activity: Not on file   Stress: Not on file   Social Connections: Not on file   Intimate Partner Violence: Not on file   Housing Stability: Low Risk  (4/15/2024)    Housing Stability Vital Sign     Unable to Pay for Housing in the Last Year: No     Number of Places Lived in the Last Year: 1     Unstable Housing in the Last Year: No      Family History:     History reviewed. No pertinent family history.   Current Medications:     Current Outpatient Medications   Medication Sig Dispense Refill    amLODIPine (NORVASC) 5 mg tablet Take 1 tablet (5 mg total) by mouth daily 90 tablet 2    ammonium lactate (LAC-HYDRIN) 5 % lotion Apply topically 2 (two) times a day 226 g 1    atorvastatin (LIPITOR) 40 mg tablet Take 1 tablet (40 mg total) by mouth every evening 90 tablet 1    Blood Glucose Monitoring Suppl (OneTouch Verio Reflect) w/Device KIT Check blood sugars three times daily. Please substitute with appropriate alternative as covered by patient's insurance. Dx: E11.65 1 kit 0    docusate sodium (COLACE) 100 mg capsule Take 1 capsule (100 mg total) by mouth 2 (two) times a day as needed for constipation 30 capsule 1    glucose blood (OneTouch Verio) test  "strip Check blood sugars three times daily. Please substitute with appropriate alternative as covered by patient's insurance. Dx: E11.65 300 each 3    hydroCHLOROthiazide 25 mg tablet Take 1 tablet (25 mg total) by mouth daily 90 tablet 2    metFORMIN (GLUCOPHAGE) 500 mg tablet Take 1 tablet (500 mg total) by mouth 2 (two) times a day with meals 180 tablet 1    nicotine (NICODERM CQ) 7 mg/24hr TD 24 hr patch Place 1 patch on the skin over 24 hours every 24 hours 28 patch 0    nicotine polacrilex (COMMIT) 2 MG lozenge Apply 1 lozenge (2 mg total) to the mouth or throat as needed for smoking cessation 100 each 0    OneTouch Delica Lancets 33G MISC Check blood sugars three times daily. Please substitute with appropriate alternative as covered by patient's insurance. Dx: E11.65 300 each 3    polyethylene glycol (GLYCOLAX) 17 GM/SCOOP powder Take 17 g by mouth daily 850 g 1    acetaminophen (TYLENOL) 500 mg tablet Take 1 tablet (500 mg total) by mouth every 6 (six) hours as needed for mild pain 60 tablet 1    albuterol (Ventolin HFA) 90 mcg/act inhaler Inhale 2 puffs every 6 (six) hours as needed for wheezing 18 g 5    gabapentin (NEURONTIN) 100 mg capsule Take 1 capsule (100 mg total) by mouth daily at bedtime 30 capsule 1    naproxen (NAPROSYN) 500 mg tablet Take 1 tablet (500 mg total) by mouth 2 (two) times a day with meals 60 tablet 1     No current facility-administered medications for this visit.      Allergies:     Allergies   Allergen Reactions    Penicillins       Physical Exam:     /85 (BP Location: Right arm, Patient Position: Sitting, Cuff Size: Large)   Pulse 80   Temp (!) 96.2 °F (35.7 °C) (Temporal)   Resp 16   Ht 5' 1\" (1.549 m)   Wt 93.9 kg (207 lb)   LMP 06/27/2019 (Approximate)   SpO2 95%   BMI 39.11 kg/m²     Physical Exam  Vitals and nursing note reviewed.   Constitutional:       General: She is not in acute distress.     Appearance: Normal appearance. She is well-developed. She is " obese.   HENT:      Head: Normocephalic and atraumatic.      Right Ear: External ear normal.      Left Ear: External ear normal.      Nose: Nose normal.   Eyes:      Conjunctiva/sclera: Conjunctivae normal.   Cardiovascular:      Rate and Rhythm: Normal rate and regular rhythm.      Pulses: Normal pulses.      Heart sounds: Normal heart sounds. No murmur heard.  Pulmonary:      Effort: Pulmonary effort is normal. No respiratory distress.      Breath sounds: Normal breath sounds.   Abdominal:      Palpations: Abdomen is soft.      Tenderness: There is no abdominal tenderness.   Musculoskeletal:         General: Normal range of motion.      Cervical back: Normal range of motion and neck supple.      Right foot: Bunion present.      Left foot: Bunion present.   Feet:      Right foot:      Skin integrity: Callus and dry skin present.      Toenail Condition: Fungal disease present.     Left foot:      Skin integrity: Callus and dry skin present.      Toenail Condition: Fungal disease present.  Skin:     General: Skin is warm and dry.   Neurological:      General: No focal deficit present.      Mental Status: She is alert and oriented to person, place, and time. Mental status is at baseline.   Psychiatric:         Mood and Affect: Mood normal.         Behavior: Behavior normal.         Thought Content: Thought content normal.         Judgment: Judgment normal.          JULIO Sarmiento  [facility]

## 2024-04-15 NOTE — ASSESSMENT & PLAN NOTE
Currently smokes 3 cigarettes per day.   Tobacco Cessation Counseling: Tobacco cessation counseling and education was provided. The patient is sincerely urged to quit consumption of tobacco. She is ready to quit tobacco. The numerous health risks of tobacco consumption were discussed. Prescribed the following medications: nicotine patch and nicotine lozenge.

## 2024-04-16 DIAGNOSIS — R80.9 MICROALBUMINURIA: ICD-10-CM

## 2024-04-16 DIAGNOSIS — E11.9 TYPE 2 DIABETES MELLITUS WITHOUT COMPLICATION, WITHOUT LONG-TERM CURRENT USE OF INSULIN (HCC): Primary | ICD-10-CM

## 2024-04-19 ENCOUNTER — HOSPITAL ENCOUNTER (OUTPATIENT)
Dept: MAMMOGRAPHY | Facility: CLINIC | Age: 59
Discharge: HOME | End: 2024-04-19
Payer: COMMERCIAL

## 2024-04-19 VITALS — BODY MASS INDEX: 39.08 KG/M2 | WEIGHT: 207 LBS | HEIGHT: 61 IN

## 2024-04-19 DIAGNOSIS — Z12.31 ENCOUNTER FOR SCREENING MAMMOGRAM FOR MALIGNANT NEOPLASM OF BREAST: ICD-10-CM

## 2024-04-19 PROCEDURE — 77067 SCR MAMMO BI INCL CAD: CPT

## 2024-04-19 PROCEDURE — 77063 BREAST TOMOSYNTHESIS BI: CPT

## 2024-04-22 ENCOUNTER — TELEPHONE (OUTPATIENT)
Dept: FAMILY MEDICINE CLINIC | Facility: CLINIC | Age: 59
End: 2024-04-22

## 2024-04-22 DIAGNOSIS — R80.9 MICROALBUMINURIA: ICD-10-CM

## 2024-04-22 DIAGNOSIS — E11.9 TYPE 2 DIABETES MELLITUS WITHOUT COMPLICATION, WITHOUT LONG-TERM CURRENT USE OF INSULIN (HCC): ICD-10-CM

## 2024-06-03 ENCOUNTER — TELEPHONE (OUTPATIENT)
Dept: GASTROENTEROLOGY | Facility: CLINIC | Age: 59
End: 2024-06-03

## 2024-06-03 ENCOUNTER — OFFICE VISIT (OUTPATIENT)
Dept: GASTROENTEROLOGY | Facility: CLINIC | Age: 59
End: 2024-06-03
Payer: COMMERCIAL

## 2024-06-03 VITALS
WEIGHT: 208 LBS | BODY MASS INDEX: 39.27 KG/M2 | SYSTOLIC BLOOD PRESSURE: 121 MMHG | DIASTOLIC BLOOD PRESSURE: 78 MMHG | HEIGHT: 61 IN | TEMPERATURE: 96.9 F | HEART RATE: 73 BPM

## 2024-06-03 DIAGNOSIS — Z86.19 HISTORY OF HEPATITIS C: ICD-10-CM

## 2024-06-03 DIAGNOSIS — R79.89 ELEVATED LFTS: ICD-10-CM

## 2024-06-03 DIAGNOSIS — Z12.11 COLON CANCER SCREENING: Primary | ICD-10-CM

## 2024-06-03 PROCEDURE — 99244 OFF/OP CNSLTJ NEW/EST MOD 40: CPT | Performed by: INTERNAL MEDICINE

## 2024-06-03 RX ORDER — POLYETHYLENE GLYCOL 3350 17 G/17G
238 POWDER, FOR SOLUTION ORAL ONCE
Qty: 238 G | Refills: 0 | Status: SHIPPED | OUTPATIENT
Start: 2024-06-03 | End: 2024-06-03

## 2024-06-03 RX ORDER — POLYETHYLENE GLYCOL 3350, SODIUM SULFATE ANHYDROUS, SODIUM BICARBONATE, SODIUM CHLORIDE, POTASSIUM CHLORIDE 236; 22.74; 6.74; 5.86; 2.97 G/4L; G/4L; G/4L; G/4L; G/4L
4000 POWDER, FOR SOLUTION ORAL ONCE
Qty: 4000 ML | Refills: 0 | Status: SHIPPED | OUTPATIENT
Start: 2024-06-03 | End: 2024-06-03

## 2024-06-03 NOTE — PROGRESS NOTES
[facility]  Outpatient Follow-up  Encounter: [id number]    PATIENT INFO     Name: Jeri Chandler  YOB: 1965   Age: 58 y.o.   Sex: female   MRN: [id number]    ASSESSMENT & PLAN     Jeri Chandler is a 58 y.o. female with PMH hypertension, constipation, T2DM, B/L back pain, tobacco use, hx substance use d/o who presents to GI office to discuss colon cancer screening and follow up of hepatitis C.    Diagnoses and all orders for this visit:    Colon cancer screening  Patient is average risk for colon cancer with no family history that she is aware of.  Discussed multiple options for colon cancer screening and patient agreeable to pursue colonoscopy  Will plan for colonoscopy with 2-day bowel preparation given her underlying constipation  Discussed importance of fiber supplement and MiraLAX daily starting now, but at least starting 7 to 10 days prior to procedure  If bowels are not improving over the week prior to procedure patient advised to call office for further recommendation as colonoscopy may need to be postponed  Discussed risks and benefits of procedure as well as need for transportation for which patient is understanding  -     Ambulatory Referral to Gastroenterology  -     Colonoscopy; Future  -     polyethylene glycol (Golytely) 4000 mL solution; Take 4,000 mL by mouth once for 1 dose Take 4000 mL by mouth once for 1 dose. Use as directed  -     polyethylene glycol (MiraLax) 17 GM/SCOOP powder; Take 238 g by mouth once for 1 dose Take 238 g my mouth. Use as directed    History of hepatitis C  History of Elevated LFTs  HCV RNA 8/18/20 [id number].  Patient took 1 month of Mavyret but was unable to obtain, second month of medication.  She never completed SVR labs because she did not complete therapy.  At the time of diagnosis, LFTs noted to be elevated and serologic workup was done significant for Neg AMA, ASMA, PIERRE, ceruloplasmin, iron stuides, and celiac panel.  Most  recent labs completed 8/2022 with normalization of LFTs.  Patient agreeable to repeat hep C RNA, CMP, INR for repeat evaluation  Discussed that if viral load remains positive we can refer for repeat treatment, but she may have cleared after 1 month of therapy alone  -     Hepatitis C RNA, Quantitative PCR, [facility]; Future  -     Comprehensive metabolic panel; Future  -     CBC and differential; Future  -     Protime-INR; Future    Constipation  Discussed patient's baseline constipation for which she uses MiraLAX once weekly.  Recommend addition of fiber supplement daily as well as utilizing MiraLAX at least once daily.  Emphasized the importance of utilizing MiraLAX for 7 to 10 days prior to colonoscopy to ensure adequate bowel prep.  Will also proceed with 2-day bowel prep given underlying constipation.    Opioid use disorder  Managed on methadone.  Possible contributing factor to underlying constipation.    HISTORY OF PRESENT ILLNESS       Jeri Chandler is a 58 y.o. female with PMH hypertension, constipation, T2DM, B/L back pain, tobacco use, hx substance use d/o who presents to GI office to discuss colon cancer screening.  Patient reports no prior colonoscopy.  She denies family history of any colon cancer.  No prior Cologuard testing. Denies nausea, vomiting, dysphagia, odynophagia.  Does endorse constipation with difficulty moving her bowels.  She does take MiraLAX once weekly to help with bowel movements, but does not take anything more regularly.    Patient with history of hepatitis C  HCV RNA 8/18/20 [id number] - took 1 month of Mavyret.  Patient reports she was unable to obtain the second month.  Because she was unable to complete treatment she never completed SVR labs  Previously seen by Dr. Aguilar in 2020 for elevated LFT's and hep C  Neg AMA, ASMA, PIERRE, ceruloplasmin, iron stuides, and celiac panel  Last CMP checked 8/2022 with normalization of LFT's, no iNR checked  Cologuard ordered but not done    Of  note, patient reports sobriety for over 1 year.  She is maintained on methadone for her opioid use disorder.     ENDOSCOPIC HISTORY     UPPER ENDOSCOPY: None prior  COLONOSCOPY: None prior    REVIEW OF SYSTEMS     CONSTITUTIONAL: Denies any fever, chills, rigors, and weight loss  HEENT: No earache or tinnitus, denies hearing loss or visual disturbances  CARDIOVASCULAR: No chest pain or palpitations  RESPIRATORY: Denies any cough, hemoptysis, shortness of breath or dyspnea on exertion  GASTROINTESTINAL: As noted in the History of Present Illness  GENITOURINARY: No problems with urination, denies any hematuria or dysuria  NEUROLOGIC: No dizziness or vertigo, denies headaches   MUSCULOSKELETAL: Denies any muscle or joint pain   SKIN: Denies skin rashes or itching  ENDOCRINE: Denies excessive thirst, denies intolerance to heat or cold  PSYCHOSOCIAL: Denies depression or anxiety, denies any recent memory loss     Historical Information   Past Medical History:   Diagnosis Date    Asthma     Drug overdose 2020    History of hepatitis C 2020    Hypertension     Methadone adverse reaction, initial encounter     Psychiatric disorder     Substance abuse (HCC)      Past Surgical History:   Procedure Laterality Date     SECTION      CHOLECYSTECTOMY       Social History   Social History     Substance and Sexual Activity   Alcohol Use Not Currently     Social History     Substance and Sexual Activity   Drug Use Not Currently    Types: Cocaine, Heroin     Social History     Tobacco Use   Smoking Status Every Day    Current packs/day: 0.50    Types: Cigarettes   Smokeless Tobacco Never     Family History   Problem Relation Age of Onset    No Known Problems Mother     Throat cancer Father     No Known Problems Sister     No Known Problems Daughter     No Known Problems Maternal Grandmother     No Known Problems Maternal Grandfather     No Known Problems Paternal Grandmother     No Known Problems Paternal Grandfather           MEDICATIONS AND ALLERGIES     Current Outpatient Medications   Medication Instructions    acetaminophen (TYLENOL) 500 mg, Oral, Every 6 hours PRN    albuterol (Ventolin HFA) 90 mcg/act inhaler 2 puffs, Inhalation, Every 6 hours PRN    amLODIPine (NORVASC) 5 mg, Oral, Daily    ammonium lactate (LAC-HYDRIN) 5 % lotion Topical, 2 times daily    atorvastatin (LIPITOR) 40 mg, Oral, Every evening    Blood Glucose Monitoring Suppl (OneTouch Verio Reflect) w/Device KIT Check blood sugars three times daily. Please substitute with appropriate alternative as covered by patient's insurance. Dx: E11.65    docusate sodium (COLACE) 100 mg, Oral, 2 times daily PRN    Empagliflozin (JARDIANCE) 10 mg, Oral, Daily    gabapentin (NEURONTIN) 100 mg, Oral, Daily at bedtime    glucose blood (OneTouch Verio) test strip Check blood sugars three times daily. Please substitute with appropriate alternative as covered by patient's insurance. Dx: E11.65    hydroCHLOROthiazide 25 mg, Oral, Daily    metFORMIN (GLUCOPHAGE) 1000 MG tablet     metFORMIN (GLUCOPHAGE) 500 mg, Oral, 2 times daily with meals    naproxen (NAPROSYN) 500 mg, Oral, 2 times daily with meals    nicotine (NICODERM CQ) 7 mg/24hr TD 24 hr patch 1 patch, Transdermal, Every 24 hours    nicotine polacrilex (COMMIT) 2 mg, Mouth/Throat, As needed    OneTouch Delica Lancets 33G MISC Check blood sugars three times daily. Please substitute with appropriate alternative as covered by patient's insurance. Dx: E11.65    polyethylene glycol (GLYCOLAX) 17 g, Oral, Daily     Allergies   Allergen Reactions    Penicillins        PHYSICAL EXAM      Objective   Last menstrual period 06/27/2019, not currently breastfeeding. There is no height or weight on file to calculate BMI.    General Appearance:   Alert, cooperative, no distress   HEENT:   Normocephalic, atraumatic, anicteric     Neck:   Supple, symmetrical, trachea midline   Lungs:   Equal chest rise, respirations unlabored    Heart:    Regular rate and rhythm   Abdomen:   Soft, non-tender, non-distended; normal bowel sounds; no masses, no organomegaly    Rectal:   Deferred    Extremities:   No cyanosis, clubbing or edema    Neuro:   Moves all 4 extremities    Skin:   No jaundice, rashes, or lesions      LABORATORY RESULTS     No visits with results within 1 Day(s) from this visit.   Latest known visit with results is:   Appointment on 04/15/2024   Component Date Value    Creatinine, Ur 04/15/2024 120.6     Albumin,U,Random 04/15/2024 59.7 (H)     Albumin Creat Ratio 04/15/2024 50 (H)     Cholesterol 04/15/2024 158     Triglycerides 04/15/2024 305 (H)     HDL, Direct 04/15/2024 31 (L)     LDL Calculated 04/15/2024 66     Hemoglobin A1C 04/15/2024 7.0 (H)     EAG 04/15/2024 154      No results found.    RADIOLOGY RESULTS: I have personally reviewed pertinent imaging studies.      Wendy Lan D.O.  Gastroenterology Fellow  [facility]  Division of Gastroenterology & Hepatology  Available of [employer]    ** Please Note: This note is constructed using a voice recognition dictation system. **

## 2024-06-03 NOTE — TELEPHONE ENCOUNTER
Procedure: Colonoscopy  Date: 08/07/2024  Physician performing: Dr. Potts  Location of procedure:    Instructions given to patient: Yes  Diabetic: Yes  Clearances: N/A

## 2024-07-26 ENCOUNTER — TELEPHONE (OUTPATIENT)
Dept: GASTROENTEROLOGY | Facility: MEDICAL CENTER | Age: 59
End: 2024-07-26

## 2024-07-26 NOTE — TELEPHONE ENCOUNTER
Confirming Upcoming Procedure: Colonoscopy on 08/07/2024  Physician performing: Dr. Potts  Location of procedure:    Prep: Golytely 2 days.

## 2024-08-07 ENCOUNTER — TELEPHONE (OUTPATIENT)
Age: 59
End: 2024-08-07

## 2024-08-07 RX ORDER — SODIUM CHLORIDE, SODIUM LACTATE, POTASSIUM CHLORIDE, CALCIUM CHLORIDE 600; 310; 30; 20 MG/100ML; MG/100ML; MG/100ML; MG/100ML
125 INJECTION, SOLUTION INTRAVENOUS CONTINUOUS
OUTPATIENT
Start: 2024-08-07

## 2024-08-07 NOTE — TELEPHONE ENCOUNTER
Scheduled date of colonoscopy (as of today):09/19/2024  Physician performing colonoscopy:Dr Donnelly  Location of colonoscopy:[facility]  Bowel prep reviewed with patient:2 day golytely   Instructions reviewed with patient by: pt has instructions   Clearances: n/a

## 2024-09-10 ENCOUNTER — APPOINTMENT (EMERGENCY)
Dept: RADIOLOGY | Facility: HOSPITAL | Age: 59
End: 2024-09-10

## 2024-09-10 ENCOUNTER — APPOINTMENT (EMERGENCY)
Dept: CT IMAGING | Facility: HOSPITAL | Age: 59
End: 2024-09-10

## 2024-09-10 ENCOUNTER — HOSPITAL ENCOUNTER (EMERGENCY)
Facility: HOSPITAL | Age: 59
Discharge: HOME | End: 2024-09-10
Attending: INTERNAL MEDICINE | Admitting: INTERNAL MEDICINE
Payer: COMMERCIAL

## 2024-09-10 ENCOUNTER — APPOINTMENT (EMERGENCY)
Dept: NON INVASIVE DIAGNOSTICS | Facility: HOSPITAL | Age: 59
End: 2024-09-10
Payer: COMMERCIAL

## 2024-09-10 VITALS
WEIGHT: 207.7 LBS | DIASTOLIC BLOOD PRESSURE: 100 MMHG | TEMPERATURE: 98.3 F | BODY MASS INDEX: 39.24 KG/M2 | SYSTOLIC BLOOD PRESSURE: 157 MMHG | HEART RATE: 81 BPM | OXYGEN SATURATION: 90 % | RESPIRATION RATE: 20 BRPM

## 2024-09-10 DIAGNOSIS — R19.5 CHANGE IN STOOL: ICD-10-CM

## 2024-09-10 DIAGNOSIS — M79.604 RIGHT LEG PAIN: ICD-10-CM

## 2024-09-10 DIAGNOSIS — R10.9 ABDOMINAL PAIN: Primary | ICD-10-CM

## 2024-09-10 LAB
ALBUMIN SERPL BCG-MCNC: 4.2 G/DL (ref 3.5–5)
ALP SERPL-CCNC: 80 U/L (ref 34–104)
ALT SERPL W P-5'-P-CCNC: 13 U/L (ref 7–52)
ANION GAP SERPL CALCULATED.3IONS-SCNC: 7 MMOL/L (ref 4–13)
AST SERPL W P-5'-P-CCNC: 16 U/L (ref 13–39)
BACTERIA UR QL AUTO: ABNORMAL /HPF
BASOPHILS # BLD AUTO: 0.02 THOUSANDS/ÂΜL (ref 0–0.1)
BASOPHILS NFR BLD AUTO: 0 % (ref 0–1)
BILIRUB SERPL-MCNC: 0.29 MG/DL (ref 0.2–1)
BILIRUB UR QL STRIP: NEGATIVE
BUN SERPL-MCNC: 13 MG/DL (ref 5–25)
CALCIUM SERPL-MCNC: 9.6 MG/DL (ref 8.4–10.2)
CHLORIDE SERPL-SCNC: 101 MMOL/L (ref 96–108)
CLARITY UR: CLEAR
CO2 SERPL-SCNC: 30 MMOL/L (ref 21–32)
COLOR UR: ABNORMAL
CREAT SERPL-MCNC: 0.79 MG/DL (ref 0.6–1.3)
EOSINOPHIL # BLD AUTO: 0.16 THOUSAND/ÂΜL (ref 0–0.61)
EOSINOPHIL NFR BLD AUTO: 3 % (ref 0–6)
ERYTHROCYTE [DISTWIDTH] IN BLOOD BY AUTOMATED COUNT: 13.2 % (ref 11.6–15.1)
GFR SERPL CREATININE-BSD FRML MDRD: 82 ML/MIN/1.73SQ M
GLUCOSE SERPL-MCNC: 168 MG/DL (ref 65–140)
GLUCOSE UR STRIP-MCNC: NEGATIVE MG/DL
HCT VFR BLD AUTO: 40.4 % (ref 34.8–46.1)
HGB BLD-MCNC: 12.9 G/DL (ref 11.5–15.4)
HGB UR QL STRIP.AUTO: NEGATIVE
HYALINE CASTS #/AREA URNS LPF: ABNORMAL /LPF
IMM GRANULOCYTES # BLD AUTO: 0.01 THOUSAND/UL (ref 0–0.2)
IMM GRANULOCYTES NFR BLD AUTO: 0 % (ref 0–2)
KETONES UR STRIP-MCNC: NEGATIVE MG/DL
LEUKOCYTE ESTERASE UR QL STRIP: 25
LYMPHOCYTES # BLD AUTO: 2.07 THOUSANDS/ÂΜL (ref 0.6–4.47)
LYMPHOCYTES NFR BLD AUTO: 36 % (ref 14–44)
MCH RBC QN AUTO: 26.4 PG (ref 26.8–34.3)
MCHC RBC AUTO-ENTMCNC: 31.9 G/DL (ref 31.4–37.4)
MCV RBC AUTO: 83 FL (ref 82–98)
MONOCYTES # BLD AUTO: 0.5 THOUSAND/ÂΜL (ref 0.17–1.22)
MONOCYTES NFR BLD AUTO: 9 % (ref 4–12)
NEUTROPHILS # BLD AUTO: 2.99 THOUSANDS/ÂΜL (ref 1.85–7.62)
NEUTS SEG NFR BLD AUTO: 52 % (ref 43–75)
NITRITE UR QL STRIP: NEGATIVE
NON-SQ EPI CELLS URNS QL MICRO: ABNORMAL /HPF
NRBC BLD AUTO-RTO: 0 /100 WBCS
PH UR STRIP.AUTO: 6 [PH]
PLATELET # BLD AUTO: 186 THOUSANDS/UL (ref 149–390)
PMV BLD AUTO: 9 FL (ref 8.9–12.7)
POTASSIUM SERPL-SCNC: 3.8 MMOL/L (ref 3.5–5.3)
PROT SERPL-MCNC: 8.2 G/DL (ref 6.4–8.4)
PROT UR STRIP-MCNC: ABNORMAL MG/DL
RBC # BLD AUTO: 4.88 MILLION/UL (ref 3.81–5.12)
RBC #/AREA URNS AUTO: ABNORMAL /HPF
SODIUM SERPL-SCNC: 138 MMOL/L (ref 135–147)
SP GR UR STRIP.AUTO: 1.02 (ref 1–1.04)
UROBILINOGEN UA: NEGATIVE MG/DL
WBC # BLD AUTO: 5.75 THOUSAND/UL (ref 4.31–10.16)
WBC #/AREA URNS AUTO: ABNORMAL /HPF

## 2024-09-10 PROCEDURE — 81003 URINALYSIS AUTO W/O SCOPE: CPT | Performed by: INTERNAL MEDICINE

## 2024-09-10 PROCEDURE — 36415 COLL VENOUS BLD VENIPUNCTURE: CPT | Performed by: INTERNAL MEDICINE

## 2024-09-10 PROCEDURE — 85025 COMPLETE CBC W/AUTO DIFF WBC: CPT | Performed by: INTERNAL MEDICINE

## 2024-09-10 PROCEDURE — 73564 X-RAY EXAM KNEE 4 OR MORE: CPT

## 2024-09-10 PROCEDURE — 74177 CT ABD & PELVIS W/CONTRAST: CPT

## 2024-09-10 PROCEDURE — 73610 X-RAY EXAM OF ANKLE: CPT

## 2024-09-10 PROCEDURE — 80053 COMPREHEN METABOLIC PANEL: CPT | Performed by: INTERNAL MEDICINE

## 2024-09-10 PROCEDURE — 93971 EXTREMITY STUDY: CPT | Performed by: SURGERY

## 2024-09-10 PROCEDURE — 99284 EMERGENCY DEPT VISIT MOD MDM: CPT

## 2024-09-10 PROCEDURE — 81001 URINALYSIS AUTO W/SCOPE: CPT | Performed by: INTERNAL MEDICINE

## 2024-09-10 PROCEDURE — 99285 EMERGENCY DEPT VISIT HI MDM: CPT | Performed by: INTERNAL MEDICINE

## 2024-09-10 PROCEDURE — 93971 EXTREMITY STUDY: CPT

## 2024-09-10 RX ADMIN — IOHEXOL 100 ML: 350 INJECTION, SOLUTION INTRAVENOUS at 15:30

## 2024-09-10 NOTE — Clinical Note
Jeri Chandler was seen and treated in our emergency department on 9/10/2024.                Diagnosis:     Jeri  .    She may return on this date: 09/13/2024         If you have any questions or concerns, please don't hesitate to call.      Shaista Augustine MD    ______________________________           _______________          _______________  Hospital Representative                              Date                                Time

## 2024-09-10 NOTE — DISCHARGE INSTRUCTIONS
CT abdomen pelvis with contrast    Result Date: 9/10/2024  Impression: Nodular hepatic contour suspicious for cirrhosis in this patient with a history of hepatitis C. No acute findings in the abdomen or pelvis. Workstation performed: [id number]     XR knee 4+ vw right injury    Result Date: 9/10/2024  Impression: No acute osseous abnormality. Degenerative changes as described. Computerized Assisted Algorithm (CAA) may have been used to analyze all applicable images. Workstation performed: [id number]     XR ankle 3+ views RIGHT    Result Date: 9/10/2024  Impression: No acute osseous abnormality. Computerized Assisted Algorithm (CAA) may have been used to analyze all applicable images. Workstation performed: [id number]

## 2024-09-10 NOTE — ED PROVIDER NOTES
"1. Abdominal pain    2. Change in stool    3. Right leg pain      ED Disposition       ED Disposition   Discharge    Condition   Stable    Date/Time   Tue Sep 10, 2024  4:25 PM    Comment   Jeri Chandler discharge to home/self care.                   Assessment & Plan       Medical Decision Making  CBC as marker of infectivity, hemoconcentration for hydration status  CMP to check for electrolytes, renal function, liver function   Lipase to check for pancreatitis  UA to check for UTI  CT abdomen pelvis to rule out surgical abdominal pathology  X-rays to rule out osseous abnormalities of the right leg  Right duplex study to rule out DVT of the right leg.    All imaging and/or lab testing discussed with patient, strict return to ED precautions discussed.  CT was negative for acute findings in the ED, but I encouraged patient to follow-up with GI as soon as possible for EGD and colonoscopy as she may have GERD, gastritis, peptic ulcer disease, malignancy, polyp or other disease that was not visualized on CT. Patient recommended to follow up promptly with appropriate outpatient provider and patient was handed GI's referral information, she does have care established with them already.  I discussed the importance of returning to the ER if she has another episodes of dark stool or bloody stool patient and her family members verbalizes understanding and agrees with plan.     In regards with her right leg pain, we discussed the results.  I discussed the importance of following up with orthopedics if her pain does not resolve.  Ideally she has follow-up in 1 week.  This was communicated to patient.  Ambulatory referral to orthopedics placed.  Patient is stable for discharge.     Portions of the record may have been created with voice recognition software. Occasional wrong word or \"sound a like\" substitutions may have occurred due to the inherent limitations of voice recognition software. Read the chart carefully and " recognize, using context, where substitutions have occurred.        Problems Addressed:  Abdominal pain: acute illness or injury  Change in stool: acute illness or injury  Right leg pain: acute illness or injury    Amount and/or Complexity of Data Reviewed  Labs: ordered. Decision-making details documented in ED Course.  Radiology: ordered.    Risk  Prescription drug management.                  ED Course as of 09/10/24 1704   Tue Sep 10, 2024   1256 WBC: 5.75   1256 Hemoglobin: 12.9   1256 Hematocrit: 40.4   1304 Leukocytes, UA(!): 25.0   1304 Nitrite, UA: Negative   1406 WBC, UA: 2-4   1406 CBC and CMP showed elevated glucose, otherwise no significant abnormalities   1420 X-ray of right ankle and right knee with no acute osseous abnormalities       Medications   iohexol (OMNIPAQUE) 350 MG/ML injection (MULTI-DOSE) 100 mL (100 mL Intravenous Given 9/10/24 1530)       History of Present Illness         Medical Problem  Leg Pain      Jeri Chandler is a 58 y.o. female who identifies as a female presenting to the Emergency Department for evaluation of right leg pain and 1 episode of dark stool yesterday.  Patient reports she had right leg pain, worse over her right knee.  Denies any trauma or falls.  States she has not tried any medications or therapies for the pain.  No previous DVT.  No warmth or swelling.  She reports she had 1 dark stool yesterday.  Did not see any visible blood in it.  She reports normal bowel movements today.  She has a colonoscopy scheduled with GI.  She reports no abdominal discomfort or pain. Patient denies headache, visual changes, fevers, chills, chest pain, palpitations, diarrhea, hematochezia, dysuria, new skin rashes or numbness or tingling of the extremities.      Review of Systems   All other systems reviewed and are negative.            Objective     ED Triage Vitals [09/10/24 1201]   Temperature Pulse Blood Pressure Respirations SpO2 Patient Position - Orthostatic VS   98.3 °F (36.8  °C) 81 157/100 20 90 % Sitting      Temp Source Heart Rate Source BP Location FiO2 (%) Pain Score    Oral Monitor Right arm -- --        Physical Exam  PHYSICAL EXAM    Constitutional:  Well developed, no acute distress  HEENT:  Conjunctiva normal. Oropharynx moist  Respiratory:  No respiratory distress  Cardiovascular:  Normal rate  GI:  Soft, nondistended, nontender  :  No costovertebral angle tenderness   Musculoskeletal:  No edema, no tenderness, no deformities.  Bilateral extremities with no erythema, edema, cellulitis, skin streaking, crepitus.  Compartments soft in bilateral lower extremities.  Right knee with no tenderness to palpation, full range of motion  Integument:  Well hydrated, no rash   Lymphatic:  No lymphadenopathy noted   Neurologic:  Alert & oriented x 3, normal motor function, no focal deficits noted   Psychiatric:  Speech and behavior appropriate       Labs Reviewed   CBC AND DIFFERENTIAL - Abnormal; Notable for the following components:       Result Value    MCH 26.4 (*)     All other components within normal limits   COMPREHENSIVE METABOLIC PANEL - Abnormal; Notable for the following components:    Glucose 168 (*)     All other components within normal limits    Narrative:     National Kidney Disease Foundation guidelines for Chronic Kidney Disease (CKD):     Stage 1 with normal or high GFR (GFR > 90 mL/min/1.73 square meters)    Stage 2 Mild CKD (GFR = 60-89 mL/min/1.73 square meters)    Stage 3A Moderate CKD (GFR = 45-59 mL/min/1.73 square meters)    Stage 3B Moderate CKD (GFR = 30-44 mL/min/1.73 square meters)    Stage 4 Severe CKD (GFR = 15-29 mL/min/1.73 square meters)    Stage 5 End Stage CKD (GFR <15 mL/min/1.73 square meters)  Note: GFR calculation is accurate only with a steady state creatinine   UA W REFLEX TO MICROSCOPIC WITH REFLEX TO CULTURE - Abnormal; Notable for the following components:    Color, UA [place] (*)     Leukocytes, UA 25.0 (*)     Protein, UA 30 (1+) (*)     All  other components within normal limits   URINE MICROSCOPIC - Abnormal; Notable for the following components:    Hyaline Casts, UA 1-2 (*)     All other components within normal limits     CT abdomen pelvis with contrast   Final Interpretation by Eduardo Graham MD (09/10 7367)      Nodular hepatic contour suspicious for cirrhosis in this patient with a history of hepatitis C.      No acute findings in the abdomen or pelvis.         Workstation performed: [id number]         VAS lower limb venous duplex study, unilateral/limited   Final Interpretation by Giuseppe Ruiz DO (09/10 1451)      XR ankle 3+ views RIGHT   Final Interpretation by Juwan Devi MD (09/10 3428)      No acute osseous abnormality.         Computerized Assisted Algorithm (CAA) may have been used to analyze all applicable images.               Workstation performed: [id number]         XR knee 4+ vw right injury   Final Interpretation by Simeon Winkler MD (09/10 2109)      No acute osseous abnormality.      Degenerative changes as described.         Computerized Assisted Algorithm (CAA) may have been used to analyze all applicable images.         Workstation performed: [id number]             Procedures       Shaista Augustine MD  09/10/24 3736

## 2024-09-11 ENCOUNTER — VBI (OUTPATIENT)
Dept: ADMINISTRATIVE | Facility: OTHER | Age: 59
End: 2024-09-11

## 2024-09-11 ENCOUNTER — TELEPHONE (OUTPATIENT)
Dept: GASTROENTEROLOGY | Facility: MEDICAL CENTER | Age: 59
End: 2024-09-11

## 2024-09-11 NOTE — TELEPHONE ENCOUNTER
09/11/24 1:07 PM     Chart reviewed for Pap Smear (HPV) aka Cervical Cancer Screening ; nothing is submitted to the patient's insurance at this time.     Taylor Carmona MA   [facility]

## 2024-09-13 ENCOUNTER — TELEPHONE (OUTPATIENT)
Dept: FAMILY MEDICINE CLINIC | Facility: CLINIC | Age: 59
End: 2024-09-13

## 2024-09-13 NOTE — TELEPHONE ENCOUNTER
hEALTH SUSTAINING MEDICATION ASSESSMENT FORM received on 9/13/2024  to be completed by PCP. Copy made and placed in PCP folder.    Forms to be delivered to PCP mailbox at assigned time.    Pt is in need to have form completed before 9/18/24. Pt just received information in by mail. Also pt is in need of a copy of problem list and medication list.

## 2024-09-14 NOTE — TELEPHONE ENCOUNTER
Patient notified form is ready for . Form scanned into patient chart Form placed in  bin under letter O.

## 2024-09-18 ENCOUNTER — TELEPHONE (OUTPATIENT)
Age: 59
End: 2024-09-18

## 2024-09-18 NOTE — TELEPHONE ENCOUNTER
Patient contacted office to reschedule her colonoscopy. Colonoscopy has been rescheduled for 10/31/24 with  at [place]. Patient has prep instructions. States pharmacy did not have a prescription for the prep. Informed patient that prescriptions were sent on 6/3/24, pharmacy confirmed. Did inform patient that a message can be sent to have prescriptions re sent to pharmacy. Patient declined and states she will go to the pharmacy.

## 2024-10-18 ENCOUNTER — PATIENT MESSAGE (OUTPATIENT)
Dept: GASTROENTEROLOGY | Facility: CLINIC | Age: 59
End: 2024-10-18

## 2024-10-28 ENCOUNTER — TELEPHONE (OUTPATIENT)
Dept: FAMILY MEDICINE CLINIC | Facility: CLINIC | Age: 59
End: 2024-10-28

## 2024-10-28 NOTE — TELEPHONE ENCOUNTER
Received [facility] request from  Jeremy COOPER received on 10/28/24. Request was scanned into chart and faxed to [facility].

## 2024-10-31 ENCOUNTER — ANESTHESIA (OUTPATIENT)
Dept: GASTROENTEROLOGY | Facility: HOSPITAL | Age: 59
End: 2024-10-31
Payer: COMMERCIAL

## 2024-10-31 ENCOUNTER — HOSPITAL ENCOUNTER (OUTPATIENT)
Dept: GASTROENTEROLOGY | Facility: HOSPITAL | Age: 59
Setting detail: OUTPATIENT SURGERY
End: 2024-10-31
Attending: INTERNAL MEDICINE
Payer: COMMERCIAL

## 2024-10-31 ENCOUNTER — ANESTHESIA EVENT (OUTPATIENT)
Dept: GASTROENTEROLOGY | Facility: HOSPITAL | Age: 59
End: 2024-10-31
Payer: COMMERCIAL

## 2024-10-31 VITALS
TEMPERATURE: 96.1 F | DIASTOLIC BLOOD PRESSURE: 77 MMHG | SYSTOLIC BLOOD PRESSURE: 129 MMHG | OXYGEN SATURATION: 99 % | RESPIRATION RATE: 20 BRPM | HEART RATE: 52 BPM

## 2024-10-31 DIAGNOSIS — Z12.11 COLON CANCER SCREENING: ICD-10-CM

## 2024-10-31 LAB — GLUCOSE SERPL-MCNC: 100 MG/DL (ref 65–140)

## 2024-10-31 PROCEDURE — 82948 REAGENT STRIP/BLOOD GLUCOSE: CPT

## 2024-10-31 PROCEDURE — 45385 COLONOSCOPY W/LESION REMOVAL: CPT | Performed by: INTERNAL MEDICINE

## 2024-10-31 PROCEDURE — 88305 TISSUE EXAM BY PATHOLOGIST: CPT | Performed by: PATHOLOGY

## 2024-10-31 RX ORDER — PROPOFOL 10 MG/ML
INJECTION, EMULSION INTRAVENOUS AS NEEDED
Status: DISCONTINUED | OUTPATIENT
Start: 2024-10-31 | End: 2024-10-31

## 2024-10-31 RX ORDER — LIDOCAINE HYDROCHLORIDE 10 MG/ML
INJECTION, SOLUTION EPIDURAL; INFILTRATION; INTRACAUDAL; PERINEURAL AS NEEDED
Status: DISCONTINUED | OUTPATIENT
Start: 2024-10-31 | End: 2024-10-31

## 2024-10-31 RX ORDER — SODIUM CHLORIDE 9 MG/ML
INJECTION, SOLUTION INTRAVENOUS CONTINUOUS PRN
Status: DISCONTINUED | OUTPATIENT
Start: 2024-10-31 | End: 2024-10-31

## 2024-10-31 RX ADMIN — PROPOFOL 30 MG: 10 INJECTION, EMULSION INTRAVENOUS at 09:43

## 2024-10-31 RX ADMIN — PROPOFOL 30 MG: 10 INJECTION, EMULSION INTRAVENOUS at 09:57

## 2024-10-31 RX ADMIN — LIDOCAINE HYDROCHLORIDE 50 MG: 10 INJECTION, SOLUTION EPIDURAL; INFILTRATION; INTRACAUDAL; PERINEURAL at 09:38

## 2024-10-31 RX ADMIN — PROPOFOL 40 MG: 10 INJECTION, EMULSION INTRAVENOUS at 10:06

## 2024-10-31 RX ADMIN — PROPOFOL 20 MG: 10 INJECTION, EMULSION INTRAVENOUS at 09:52

## 2024-10-31 RX ADMIN — PROPOFOL 30 MG: 10 INJECTION, EMULSION INTRAVENOUS at 10:04

## 2024-10-31 RX ADMIN — PROPOFOL 80 MG: 10 INJECTION, EMULSION INTRAVENOUS at 09:38

## 2024-10-31 RX ADMIN — SODIUM CHLORIDE: 0.9 INJECTION, SOLUTION INTRAVENOUS at 09:32

## 2024-10-31 RX ADMIN — PROPOFOL 30 MG: 10 INJECTION, EMULSION INTRAVENOUS at 09:49

## 2024-10-31 RX ADMIN — PROPOFOL 30 MG: 10 INJECTION, EMULSION INTRAVENOUS at 09:45

## 2024-10-31 RX ADMIN — PROPOFOL 30 MG: 10 INJECTION, EMULSION INTRAVENOUS at 09:40

## 2024-10-31 RX ADMIN — PROPOFOL 30 MG: 10 INJECTION, EMULSION INTRAVENOUS at 10:01

## 2024-10-31 NOTE — ANESTHESIA POSTPROCEDURE EVALUATION
Post-Op Assessment Note    CV Status:  Stable  Pain Score: 0    Pain management: adequate       Mental Status:  Alert and awake   Hydration Status:  Euvolemic   PONV Controlled:  Controlled   Airway Patency:  Patent     Post Op Vitals Reviewed: Yes    No anethesia notable event occurred.    Staff: Anesthesiologist, CRNA         Last Filed PACU Vitals:  Vitals Value Taken Time   Temp 96.1 °F (35.6 °C) 10/31/24 1022   Pulse 59 10/31/24 1022   /68 10/31/24 1022   Resp 18 10/31/24 1022   SpO2 94 % 10/31/24 1022       Modified Barrett:  Activity: 2 (10/31/2024 10:45 AM)  Respiration: 2 (10/31/2024 10:45 AM)  Circulation: 2 (10/31/2024 10:45 AM)  Consciousness: 2 (10/31/2024 10:45 AM)  Oxygen Saturation: 2 (10/31/2024 10:45 AM)  Modified Barrett Score: 10 (10/31/2024 10:45 AM)

## 2024-10-31 NOTE — H&P
H&P - Gastroenterology   Name: Jeri Chandler 58 y.o. female I MRN: [id number]  Unit/Bed#:  I Date of Admission: 10/31/2024   Date of Service: 10/31/2024 I Hospital Day: 0     Assessment & Plan   This is a 58 y.o. year old female here for colonoscopy, and she is stable and optimized for her procedure.    History of Present Illness    Jeri Chandler is a 58 y.o. year old female who presents for screening for colon cancer as well as constipation.    REVIEW OF SYSTEMS: Per the HPI, and otherwise unremarkable.    Historical Information   Past Medical History:   Diagnosis Date    Asthma     Drug overdose 2020    History of hepatitis C 2020    Hypertension     Methadone adverse reaction, initial encounter     Psychiatric disorder     Substance abuse (HCC)      Past Surgical History:   Procedure Laterality Date     SECTION      CHOLECYSTECTOMY       Social History     Tobacco Use    Smoking status: Every Day     Current packs/day: 0.50     Types: Cigarettes    Smokeless tobacco: Never   Vaping Use    Vaping status: Never Used   Substance and Sexual Activity    Alcohol use: Not Currently    Drug use: Not Currently     Types: Cocaine, Heroin    Sexual activity: Not Currently     E-Cigarette/Vaping    E-Cigarette Use Never User      E-Cigarette/Vaping Substances     Meds/Allergies     Current Outpatient Medications:     amLODIPine (NORVASC) 5 mg tablet    metFORMIN (GLUCOPHAGE) 1000 MG tablet    metFORMIN (GLUCOPHAGE) 500 mg tablet    acetaminophen (TYLENOL) 500 mg tablet    albuterol (Ventolin HFA) 90 mcg/act inhaler    ammonium lactate (LAC-HYDRIN) 5 % lotion    atorvastatin (LIPITOR) 40 mg tablet    Blood Glucose Monitoring Suppl (OneTouch Verio Reflect) w/Device KIT    docusate sodium (COLACE) 100 mg capsule    Empagliflozin (JARDIANCE) 10 MG TABS tablet    gabapentin (NEURONTIN) 100 mg capsule    glucose blood (OneTouch Verio) test strip    hydroCHLOROthiazide 25 mg tablet    naproxen (NAPROSYN) 500 mg  tablet    nicotine (NICODERM CQ) 7 mg/24hr TD 24 hr patch    nicotine polacrilex (COMMIT) 2 MG lozenge    OneTouch Delica Lancets 33G MISC    polyethylene glycol (GLYCOLAX) 17 GM/SCOOP powder    polyethylene glycol (Golytely) 4000 mL solution    polyethylene glycol (MiraLax) 17 GM/SCOOP powder  Allergies   Allergen Reactions    Penicillins        Objective :       Physical Exam  Gen: NAD  Head: NCAT  CV: RRR  CHEST: Clear  ABD: soft, NT/ND  EXT: no edema

## 2024-10-31 NOTE — ANESTHESIA PREPROCEDURE EVALUATION
Procedure:  COLONOSCOPY    Relevant Problems   CARDIO   (+) Essential hypertension   (+) Murmur      ENDO   (+) Type 2 diabetes mellitus without complication (HCC)      MUSCULOSKELETAL   (+) Chronic bilateral low back pain with bilateral sciatica      NEURO/PSYCH   (+) Chronic bilateral low back pain with bilateral sciatica        Physical Exam    Airway    Mallampati score: II  TM Distance: >3 FB  Neck ROM: full     Dental    upper dentures and lower dentures,     Cardiovascular      Pulmonary      Other Findings  post-pubertal.      Anesthesia Plan  ASA Score- 3     Anesthesia Type- IV sedation with anesthesia with ASA Monitors.         Additional Monitors:     Airway Plan:            Plan Factors-Exercise tolerance (METS): >4 METS.    Chart reviewed. EKG reviewed.  Existing labs reviewed. Patient summary reviewed.                  Induction-     Postoperative Plan-     Perioperative Resuscitation Plan - Level 1 - Full Code.       Informed Consent- Anesthetic plan and risks discussed with patient.  I personally reviewed this patient with the CRNA. Discussed and agreed on the Anesthesia Plan with the CRNA..    Discussion done with assistance of  services [place] 52047.

## 2024-10-31 NOTE — ANESTHESIA POSTPROCEDURE EVALUATION
Post-Op Assessment Note    CV Status:  Stable  Pain Score: 0    Pain management: adequate       Mental Status:  Alert and awake   Hydration Status:  Euvolemic   PONV Controlled:  Controlled   Airway Patency:  Patent     Post Op Vitals Reviewed: Yes    No anethesia notable event occurred.    Staff: Anesthesiologist, CRNA       Last Filed PACU Vitals:  Vitals Value Taken Time   Temp 96.1 °F (35.6 °C) 10/31/24 1022   Pulse 59 10/31/24 1022   /68 10/31/24 1022   Resp 18 10/31/24 1022   SpO2 94 % 10/31/24 1022       Modified Barrett:  Activity: 2 (10/31/2024  8:43 AM)  Respiration: 2 (10/31/2024  8:43 AM)  Circulation: 2 (10/31/2024  8:43 AM)  Consciousness: 2 (10/31/2024  8:43 AM)  Oxygen Saturation: 2 (10/31/2024  8:43 AM)  Modified Barrett Score: 10 (10/31/2024  8:43 AM)

## 2024-11-06 PROCEDURE — 88305 TISSUE EXAM BY PATHOLOGIST: CPT | Performed by: PATHOLOGY

## 2024-11-11 ENCOUNTER — TELEPHONE (OUTPATIENT)
Dept: GASTROENTEROLOGY | Facility: CLINIC | Age: 59
End: 2024-11-11

## 2024-11-11 NOTE — TELEPHONE ENCOUNTER
----- Message from Chacha Heck DO sent at 11/9/2024  7:49 PM EST -----  Patient notified via [employer]. Transverse colon polyp was a SSL and sigmoid polyp was hyperplastic. Plan for repeat in 1 year due to inadequate prep. Thank you.

## 2024-11-11 NOTE — TELEPHONE ENCOUNTER
I tried to call back to go over results it just kept ringing with no voice mail then sounded like hung up, I will attempt again later, thank you

## 2024-11-13 ENCOUNTER — RESULTS FOLLOW-UP (OUTPATIENT)
Dept: GASTROENTEROLOGY | Facility: CLINIC | Age: 59
End: 2024-11-13

## 2024-12-10 ENCOUNTER — OFFICE VISIT (OUTPATIENT)
Dept: FAMILY MEDICINE CLINIC | Facility: CLINIC | Age: 59
End: 2024-12-10

## 2024-12-10 VITALS
BODY MASS INDEX: 38.89 KG/M2 | SYSTOLIC BLOOD PRESSURE: 110 MMHG | HEART RATE: 89 BPM | OXYGEN SATURATION: 97 % | HEIGHT: 61 IN | TEMPERATURE: 98 F | RESPIRATION RATE: 16 BRPM | WEIGHT: 206 LBS | DIASTOLIC BLOOD PRESSURE: 78 MMHG

## 2024-12-10 DIAGNOSIS — E11.9 TYPE 2 DIABETES MELLITUS WITHOUT COMPLICATION, WITHOUT LONG-TERM CURRENT USE OF INSULIN (HCC): Primary | ICD-10-CM

## 2024-12-10 DIAGNOSIS — R80.9 MICROALBUMINURIA: ICD-10-CM

## 2024-12-10 DIAGNOSIS — I10 ESSENTIAL HYPERTENSION: ICD-10-CM

## 2024-12-10 DIAGNOSIS — K59.03 DRUG-INDUCED CONSTIPATION: ICD-10-CM

## 2024-12-10 DIAGNOSIS — L84 CALLUS OF FOOT: ICD-10-CM

## 2024-12-10 DIAGNOSIS — J45.20 MILD INTERMITTENT ASTHMA, UNSPECIFIED WHETHER COMPLICATED: ICD-10-CM

## 2024-12-10 DIAGNOSIS — Z23 ENCOUNTER FOR IMMUNIZATION: ICD-10-CM

## 2024-12-10 DIAGNOSIS — F17.200 TOBACCO DEPENDENCE: ICD-10-CM

## 2024-12-10 DIAGNOSIS — Z12.4 SCREENING FOR CERVICAL CANCER: ICD-10-CM

## 2024-12-10 LAB — SL AMB POCT HEMOGLOBIN AIC: 6.7 (ref ?–6.5)

## 2024-12-10 PROCEDURE — 90673 RIV3 VACCINE NO PRESERV IM: CPT

## 2024-12-10 PROCEDURE — 3078F DIAST BP <80 MM HG: CPT

## 2024-12-10 PROCEDURE — 99214 OFFICE O/P EST MOD 30 MIN: CPT

## 2024-12-10 PROCEDURE — 90472 IMMUNIZATION ADMIN EACH ADD: CPT

## 2024-12-10 PROCEDURE — 90750 HZV VACC RECOMBINANT IM: CPT

## 2024-12-10 PROCEDURE — 90471 IMMUNIZATION ADMIN: CPT

## 2024-12-10 PROCEDURE — 3074F SYST BP LT 130 MM HG: CPT

## 2024-12-10 PROCEDURE — 83036 HEMOGLOBIN GLYCOSYLATED A1C: CPT

## 2024-12-10 RX ORDER — POLYETHYLENE GLYCOL 3350 17 G
2 POWDER IN PACKET (EA) ORAL AS NEEDED
Qty: 100 EACH | Refills: 0 | Status: SHIPPED | OUTPATIENT
Start: 2024-12-10

## 2024-12-10 RX ORDER — POLYETHYLENE GLYCOL 3350 17 G/17G
17 POWDER, FOR SOLUTION ORAL DAILY
Qty: 850 G | Refills: 1 | Status: SHIPPED | OUTPATIENT
Start: 2024-12-10

## 2024-12-10 RX ORDER — HYDROCHLOROTHIAZIDE 25 MG/1
25 TABLET ORAL DAILY
Qty: 90 TABLET | Refills: 2 | Status: SHIPPED | OUTPATIENT
Start: 2024-12-10

## 2024-12-10 RX ORDER — AMLODIPINE BESYLATE 5 MG/1
5 TABLET ORAL DAILY
Qty: 90 TABLET | Refills: 2 | Status: SHIPPED | OUTPATIENT
Start: 2024-12-10

## 2024-12-10 RX ORDER — ALBUTEROL SULFATE 90 UG/1
2 INHALANT RESPIRATORY (INHALATION) EVERY 6 HOURS PRN
Qty: 18 G | Refills: 5 | Status: SHIPPED | OUTPATIENT
Start: 2024-12-10

## 2024-12-10 RX ORDER — LANCETS 33 GAUGE
EACH MISCELLANEOUS
Qty: 300 EACH | Refills: 3 | Status: SHIPPED | OUTPATIENT
Start: 2024-12-10

## 2024-12-10 RX ORDER — BLOOD SUGAR DIAGNOSTIC
STRIP MISCELLANEOUS
Qty: 300 EACH | Refills: 3 | Status: SHIPPED | OUTPATIENT
Start: 2024-12-10

## 2024-12-10 RX ORDER — ATORVASTATIN CALCIUM 40 MG/1
40 TABLET, FILM COATED ORAL EVERY EVENING
Qty: 90 TABLET | Refills: 1 | Status: SHIPPED | OUTPATIENT
Start: 2024-12-10 | End: 2025-06-08

## 2024-12-10 NOTE — ASSESSMENT & PLAN NOTE
Lab Results   Component Value Date    HGBA1C 6.7 (A) 12/10/2024   She has not been checking home BGs at home because she needs lancets & test strips. Sent today.   - At goal. Continue Jardiance 10 mg daily & metformin 500 mg BID.     Orders:    POCT hemoglobin A1c    Empagliflozin (JARDIANCE) 10 MG TABS tablet; Take 1 tablet (10 mg total) by mouth daily    metFORMIN (GLUCOPHAGE) 500 mg tablet; Take 1 tablet (500 mg total) by mouth 2 (two) times a day with meals    atorvastatin (LIPITOR) 40 mg tablet; Take 1 tablet (40 mg total) by mouth every evening    glucose blood (OneTouch Verio) test strip; Check blood sugars three times daily. Please substitute with appropriate alternative as covered by patient's insurance. Dx: E11.65    OneTouch Delica Lancets 33G MISC; Check blood sugars three times daily. Please substitute with appropriate alternative as covered by patient's insurance. Dx: E11.65    Ambulatory Referral to Ophthalmology; Future

## 2024-12-10 NOTE — ASSESSMENT & PLAN NOTE
BP Readings from Last 3 Encounters:   12/10/24 110/78   10/31/24 129/77   09/10/24 157/100    - At goal. Continue amlodipine 5 mg & hctz 25 mg daily.  -reviewed eating a low salt diet (such as the DASH diet), limiting alcohol, exercising, and wt loss    Orders:    amLODIPine (NORVASC) 5 mg tablet; Take 1 tablet (5 mg total) by mouth daily    hydroCHLOROthiazide 25 mg tablet; Take 1 tablet (25 mg total) by mouth daily    Ambulatory Referral to Ophthalmology; Future

## 2024-12-10 NOTE — PROGRESS NOTES
Name: eJri Chandler      : 1965      MRN: [id number]  Encounter Provider: JULIO Sarmiento  Encounter Date: 12/10/2024   Encounter department: [facility]  :  Assessment & Plan  Type 2 diabetes mellitus without complication, without long-term current use of insulin ([facility])    Lab Results   Component Value Date    HGBA1C 6.7 (A) 12/10/2024   She has not been checking home BGs at home because she needs lancets & test strips. Sent today.   - At goal. Continue Jardiance 10 mg daily & metformin 500 mg BID.     Orders:    POCT hemoglobin A1c    Empagliflozin (JARDIANCE) 10 MG TABS tablet; Take 1 tablet (10 mg total) by mouth daily    metFORMIN (GLUCOPHAGE) 500 mg tablet; Take 1 tablet (500 mg total) by mouth 2 (two) times a day with meals    atorvastatin (LIPITOR) 40 mg tablet; Take 1 tablet (40 mg total) by mouth every evening    glucose blood (OneTouch Verio) test strip; Check blood sugars three times daily. Please substitute with appropriate alternative as covered by patient's insurance. Dx: E11.65    OneTouch Delica Lancets 33G MISC; Check blood sugars three times daily. Please substitute with appropriate alternative as covered by patient's insurance. Dx: E11.65    Ambulatory Referral to Ophthalmology; Future    Screening for cervical cancer    Orders:    Ambulatory referral to Obstetrics / Gynecology; Future    Essential hypertension  BP Readings from Last 3 Encounters:   12/10/24 110/78   10/31/24 129/77   09/10/24 157/100    - At goal. Continue amlodipine 5 mg & hctz 25 mg daily.  -reviewed eating a low salt diet (such as the DASH diet), limiting alcohol, exercising, and wt loss    Orders:    amLODIPine (NORVASC) 5 mg tablet; Take 1 tablet (5 mg total) by mouth daily    hydroCHLOROthiazide 25 mg tablet; Take 1 tablet (25 mg total) by mouth daily    Ambulatory Referral to Ophthalmology; Future    Tobacco dependence  Currently smoking 1 cigarette per day, down from  "9-10 cigarettes per day. Reports patches and lozenges helped to curb cravings but she needs more.   Orders:    nicotine (NICODERM CQ) 7 mg/24hr TD 24 hr patch; Place 1 patch on the skin over 24 hours every 24 hours    nicotine polacrilex (COMMIT) 2 MG lozenge; Apply 1 lozenge (2 mg total) to the mouth or throat as needed for smoking cessation    Microalbuminuria    Orders:    Empagliflozin (JARDIANCE) 10 MG TABS tablet; Take 1 tablet (10 mg total) by mouth daily    Mild intermittent asthma, unspecified whether complicated  - Rare VERN use  - Continue with prn albuterol     Orders:    albuterol (Ventolin HFA) 90 mcg/act inhaler; Inhale 2 puffs every 6 (six) hours as needed for wheezing    Drug-induced constipation    Orders:    polyethylene glycol (GLYCOLAX) 17 GM/SCOOP powder; Take 17 g by mouth daily    Encounter for immunization    Orders:    Zoster Vaccine Recombinant IM    influenza vaccine, recombinant, PF, 0.5 mL IM (Flublok)    Callus of foot    Orders:    salicylic acid 17 % gel; Apply topically daily           History of Present Illness     Jeri Chandler is a 59 y.o. female  has a past medical history of Asthma, Drug overdose, History of hepatitis C, Hypertension, Methadone adverse reaction, initial encounter, Psychiatric disorder, and Substance abuse (HCC).  has a past surgical history that includes Cholecystectomy and  section.    She presents today for calluses on her feet that are causing significant discomfort. She was using ammonium cream which was not helping. She was referred to podiatry previously but has not seen them yet.       Review of Systems  As per HPI      Objective   /78 (BP Location: Right arm, Patient Position: Sitting, Cuff Size: Standard)   Pulse 89   Temp 98 °F (36.7 °C) (Temporal)   Resp 16   Ht 5' 1\" (1.549 m)   Wt 93.4 kg (206 lb)   LMP 2019 (Approximate)   SpO2 97%   BMI 38.92 kg/m²      Physical Exam  Vitals and nursing note reviewed.   Constitutional: "       General: She is not in acute distress.     Appearance: Normal appearance. She is well-developed. She is obese.   HENT:      Head: Normocephalic and atraumatic.      Right Ear: External ear normal.      Left Ear: External ear normal.      Nose: Nose normal.   Eyes:      Conjunctiva/sclera: Conjunctivae normal.   Cardiovascular:      Rate and Rhythm: Normal rate and regular rhythm.      Pulses: Normal pulses.      Heart sounds: Normal heart sounds. No murmur heard.  Pulmonary:      Effort: Pulmonary effort is normal. No respiratory distress.      Breath sounds: Normal breath sounds.   Abdominal:      Palpations: Abdomen is soft.      Tenderness: There is no abdominal tenderness.   Musculoskeletal:         General: No swelling. Normal range of motion.      Cervical back: Normal range of motion and neck supple.   Feet:      Right foot:      Skin integrity: Callus, dry skin and fissure present.      Left foot:      Skin integrity: Callus, dry skin and fissure present.   Skin:     General: Skin is warm and dry.      Capillary Refill: Capillary refill takes less than 2 seconds.   Neurological:      General: No focal deficit present.      Mental Status: She is alert and oriented to person, place, and time. Mental status is at baseline.   Psychiatric:         Mood and Affect: Mood normal.         Behavior: Behavior normal.         Thought Content: Thought content normal.         Judgment: Judgment normal.

## 2024-12-10 NOTE — ASSESSMENT & PLAN NOTE
Currently smoking 1 cigarette per day, down from 9-10 cigarettes per day. Reports patches and lozenges helped to curb cravings but she needs more.   Orders:    nicotine (NICODERM CQ) 7 mg/24hr TD 24 hr patch; Place 1 patch on the skin over 24 hours every 24 hours    nicotine polacrilex (COMMIT) 2 MG lozenge; Apply 1 lozenge (2 mg total) to the mouth or throat as needed for smoking cessation

## 2025-01-28 ENCOUNTER — ANNUAL EXAM (OUTPATIENT)
Dept: OBGYN CLINIC | Facility: CLINIC | Age: 60
End: 2025-01-28

## 2025-01-28 VITALS — BODY MASS INDEX: 39.11 KG/M2 | DIASTOLIC BLOOD PRESSURE: 98 MMHG | WEIGHT: 207 LBS | SYSTOLIC BLOOD PRESSURE: 138 MMHG

## 2025-01-28 DIAGNOSIS — Z12.31 ENCOUNTER FOR SCREENING MAMMOGRAM FOR MALIGNANT NEOPLASM OF BREAST: ICD-10-CM

## 2025-01-28 DIAGNOSIS — Z01.419 WELL WOMAN EXAM: Primary | ICD-10-CM

## 2025-01-28 DIAGNOSIS — E66.9 OBESITY (BMI 30-39.9): ICD-10-CM

## 2025-01-28 DIAGNOSIS — Z12.4 SCREENING FOR CERVICAL CANCER: ICD-10-CM

## 2025-01-28 DIAGNOSIS — Z12.11 COLON CANCER SCREENING: ICD-10-CM

## 2025-01-28 DIAGNOSIS — F17.200 TOBACCO DEPENDENCE: ICD-10-CM

## 2025-01-28 PROCEDURE — G0145 SCR C/V CYTO,THINLAYER,RESCR: HCPCS

## 2025-01-28 PROCEDURE — G0476 HPV COMBO ASSAY CA SCREEN: HCPCS

## 2025-01-28 NOTE — PROGRESS NOTES
Name: Jeri Chandler      : 1965      MRN: [id number]  Encounter Provider: Mariel Dominguez MD  Encounter Date: 2025   Encounter department: [facility]  :  Assessment & Plan  Well woman exam  No GYN concerns today  Birth control: postmenopausal  Cervical cancer screening: patient had never had a pap smear or speculum exam until today, although she did not tolerate speculum exam and she opted to do blind pap smear testing with only cytobrush. Patient is aware that the results may not be as accurate as visualized pap smear but hope to at least obtain HPV status as primary HPV testing also acceptable  Breast Cancer screening: Next due 25, order placed today  Colon cancer Screening: Next due 10/31/25, order placed today  STD screening: declines  Reviewed healthy lifestyle and safe sex practices  RTO for annual exam or PRN  Screening for cervical cancer  See above for details  Orders:    Ambulatory referral to Obstetrics / Gynecology    Liquid-based pap, screening    HPV High Risk    Tobacco dependence  Patient previously smoked 0.5 packs per day  Reports she is using a nicotine patch and now she smokes much less  Congratulated patient on her quitting process  Smoking cessation materials provided  Encounter for screening mammogram for malignant neoplasm of breast  Next due 25, order placed today  Orders:    Mammo screening bilateral w 3d and cad; Future    Colon cancer screening  Next due 10/31/25, referral placed to GI  Orders:    Ambulatory Referral to Gastroenterology; Future    Obesity (BMI 30-39.9)  Patient aware she has an elevated BMI  She is interested in starting GLP-1 medication  Encouraged her to discuss with her PCP      History of Present Illness     Jeri Chandler is a 59 y.o.  female who presents for annual exam. Patient is postmenopausal since age 55yo and denies hot flashes or complaints. She denies any vaginal discharge or urinary symptoms  "including incontinence, frequency, urgency, dysuria. Patient is not currently sexually active and states she is \"okay with that\".    Depression: Not at risk (1/28/2025)    PHQ-2     PHQ-2 Score: 0   The patient was screened for a depression and was negative with a PHQ-2 of 0.    History obtained from: patient    Review of Systems   Constitutional:  Negative for chills and fever.   HENT: Negative.     Respiratory:  Negative for cough and shortness of breath.    Cardiovascular:  Negative for chest pain.   Gastrointestinal:  Negative for abdominal pain, nausea and vomiting.   Genitourinary: Negative.  Negative for vaginal bleeding.   Musculoskeletal: Negative.    Neurological:  Negative for headaches.   Psychiatric/Behavioral: Negative.       Current Outpatient Medications on File Prior to Visit   Medication Sig Dispense Refill    albuterol (Ventolin HFA) 90 mcg/act inhaler Inhale 2 puffs every 6 (six) hours as needed for wheezing 18 g 5    amLODIPine (NORVASC) 5 mg tablet Take 1 tablet (5 mg total) by mouth daily 90 tablet 2    atorvastatin (LIPITOR) 40 mg tablet Take 1 tablet (40 mg total) by mouth every evening 90 tablet 1    Blood Glucose Monitoring Suppl (OneTouch Verio Reflect) w/Device KIT Check blood sugars three times daily. Please substitute with appropriate alternative as covered by patient's insurance. Dx: E11.65 1 kit 0    Empagliflozin (JARDIANCE) 10 MG TABS tablet Take 1 tablet (10 mg total) by mouth daily 90 tablet 1    glucose blood (OneTouch Verio) test strip Check blood sugars three times daily. Please substitute with appropriate alternative as covered by patient's insurance. Dx: E11.65 300 each 3    hydroCHLOROthiazide 25 mg tablet Take 1 tablet (25 mg total) by mouth daily 90 tablet 2    metFORMIN (GLUCOPHAGE) 500 mg tablet Take 1 tablet (500 mg total) by mouth 2 (two) times a day with meals 180 tablet 1    nicotine (NICODERM CQ) 7 mg/24hr TD 24 hr patch Place 1 patch on the skin over 24 hours " every 24 hours 28 patch 1    nicotine polacrilex (COMMIT) 2 MG lozenge Apply 1 lozenge (2 mg total) to the mouth or throat as needed for smoking cessation 100 each 0    OneTouch Delica Lancets 33G MISC Check blood sugars three times daily. Please substitute with appropriate alternative as covered by patient's insurance. Dx: E11.65 300 each 3    polyethylene glycol (GLYCOLAX) 17 GM/SCOOP powder Take 17 g by mouth daily 850 g 1    salicylic acid 17 % gel Apply topically daily 28 g 1     No current facility-administered medications on file prior to visit.      Social History     Tobacco Use    Smoking status: Every Day     Current packs/day: 0.50     Types: Cigarettes    Smokeless tobacco: Never   Vaping Use    Vaping status: Never Used   Substance and Sexual Activity    Alcohol use: Not Currently    Drug use: Not Currently     Types: Cocaine, Heroin    Sexual activity: Yes     Partners: Female     Birth control/protection: Post-menopausal        Objective   /98 (BP Location: Left arm, Patient Position: Sitting, Cuff Size: Standard)   Wt 93.9 kg (207 lb)   LMP 06/27/2019 (Approximate)   BMI 39.11 kg/m²      Physical Exam  Vitals reviewed.   Constitutional:       General: She is not in acute distress.  HENT:      Head: Normocephalic and atraumatic.   Eyes:      Extraocular Movements: Extraocular movements intact.   Cardiovascular:      Rate and Rhythm: Normal rate and regular rhythm.      Heart sounds: Normal heart sounds.   Pulmonary:      Effort: Pulmonary effort is normal.      Breath sounds: Normal breath sounds.   Abdominal:      General: There is no distension.      Palpations: Abdomen is soft.      Tenderness: There is no abdominal tenderness.   Genitourinary:     Comments: : Normal appearing external female genitalia, normal appearing urethral meatus. On speculum exam, normal appearing vaginal epithelium, no vaginal discharge, no bleeding. Cervix unable to be be visualized due to patient intolerance  to speculum to speculum  Musculoskeletal:         General: No tenderness.      Right lower leg: No edema.      Left lower leg: No edema.   Skin:     General: Skin is warm and dry.   Neurological:      General: No focal deficit present.      Mental Status: She is alert and oriented to person, place, and time.   Psychiatric:         Mood and Affect: Mood normal.

## 2025-01-29 LAB
HPV HR 12 DNA CVX QL NAA+PROBE: NEGATIVE
HPV16 DNA CVX QL NAA+PROBE: NEGATIVE
HPV18 DNA CVX QL NAA+PROBE: NEGATIVE

## 2025-01-29 NOTE — ASSESSMENT & PLAN NOTE
Patient aware she has an elevated BMI  She is interested in starting GLP-1 medication  Encouraged her to discuss with her PCP

## 2025-01-31 LAB
LAB AP GYN PRIMARY INTERPRETATION: NORMAL
Lab: NORMAL

## 2025-02-05 ENCOUNTER — TELEPHONE (OUTPATIENT)
Age: 60
End: 2025-02-05

## 2025-02-05 ENCOUNTER — PREP FOR PROCEDURE (OUTPATIENT)
Age: 60
End: 2025-02-05

## 2025-02-05 DIAGNOSIS — Z12.11 SCREENING FOR COLON CANCER: Primary | ICD-10-CM

## 2025-02-05 NOTE — TELEPHONE ENCOUNTER
"02/05/25  Screened by: Leander Dexter    Referring Provider Dr. Robbins    Pre- Screening:     There is no height or weight on file to calculate BMI.  H 4'11\" Wt 205 lbs  BMI 41.4    Has patient been referred for a routine screening Colonoscopy? yes  Is the patient between 45-75 years old? yes      Previous Colonoscopy yes   If yes:    Date: 10/31/24    Facility:     Reason:     Does the patient want to see a Gastroenterologist prior to their procedure OR are they having any GI symptoms? no    Has the patient been hospitalized or had abdominal surgery in the past 6 months? no    Does the patient use supplemental oxygen? no    Does the patient take Coumadin, Lovenox, Plavix, Elliquis, Xarelto, or other blood thinning medication? no    Has the patient had a stroke, cardiac event, or stent placed in the past year? no    If patient is between 45yrs - 49yrs, please advise patient that we will have to confirm benefits & coverage with their insurance company for a routine screening colonoscopy.    "

## 2025-02-05 NOTE — TELEPHONE ENCOUNTER
Scheduled date of colonoscopy (as of today): 11/11/25  Physician performing colonoscopy: Dr. Robbins  Location of colonoscopy: [facility]  Bowel prep reviewed with patient: Golytely 2 day prep, Diabetic instructions, Screening vs diagnostic sent via email she provided today pt does not have the access for my chart.  Instructions reviewed with patient by: Leander  Clearances: N/A    DIABETIC    Lulu from [facility] [phone] called to schedule with her daughter Faith Clark on the  to schedule Colonoscopy.      Advised to bring her insurance ID cards and any copayment required by her insurance and to contact insurance co for any insurance or coverage related questions.

## 2025-04-10 ENCOUNTER — APPOINTMENT (OUTPATIENT)
Dept: LAB | Facility: CLINIC | Age: 60
End: 2025-04-10
Payer: COMMERCIAL

## 2025-04-10 ENCOUNTER — OFFICE VISIT (OUTPATIENT)
Dept: FAMILY MEDICINE CLINIC | Facility: CLINIC | Age: 60
End: 2025-04-10

## 2025-04-10 VITALS
DIASTOLIC BLOOD PRESSURE: 80 MMHG | HEIGHT: 61 IN | OXYGEN SATURATION: 97 % | SYSTOLIC BLOOD PRESSURE: 120 MMHG | TEMPERATURE: 98 F | WEIGHT: 209 LBS | HEART RATE: 68 BPM | BODY MASS INDEX: 39.46 KG/M2 | RESPIRATION RATE: 18 BRPM

## 2025-04-10 DIAGNOSIS — Z86.19 HISTORY OF HEPATITIS C: ICD-10-CM

## 2025-04-10 DIAGNOSIS — E11.9 TYPE 2 DIABETES MELLITUS WITHOUT COMPLICATION, WITHOUT LONG-TERM CURRENT USE OF INSULIN (HCC): Primary | ICD-10-CM

## 2025-04-10 DIAGNOSIS — R79.89 ELEVATED LFTS: ICD-10-CM

## 2025-04-10 DIAGNOSIS — Z23 NEED FOR COVID-19 VACCINE: ICD-10-CM

## 2025-04-10 DIAGNOSIS — I10 ESSENTIAL HYPERTENSION: ICD-10-CM

## 2025-04-10 DIAGNOSIS — L84 CALLUS OF FOOT: ICD-10-CM

## 2025-04-10 DIAGNOSIS — E11.9 TYPE 2 DIABETES MELLITUS WITHOUT COMPLICATION, WITHOUT LONG-TERM CURRENT USE OF INSULIN (HCC): ICD-10-CM

## 2025-04-10 LAB
ALBUMIN SERPL BCG-MCNC: 4.2 G/DL (ref 3.5–5)
ALP SERPL-CCNC: 83 U/L (ref 34–104)
ALT SERPL W P-5'-P-CCNC: 15 U/L (ref 7–52)
ANION GAP SERPL CALCULATED.3IONS-SCNC: 6 MMOL/L (ref 4–13)
AST SERPL W P-5'-P-CCNC: 16 U/L (ref 13–39)
BASOPHILS # BLD AUTO: 0.02 THOUSANDS/ÂΜL (ref 0–0.1)
BASOPHILS NFR BLD AUTO: 0 % (ref 0–1)
BILIRUB SERPL-MCNC: 0.31 MG/DL (ref 0.2–1)
BUN SERPL-MCNC: 17 MG/DL (ref 5–25)
CALCIUM SERPL-MCNC: 9.3 MG/DL (ref 8.4–10.2)
CHLORIDE SERPL-SCNC: 102 MMOL/L (ref 96–108)
CHOLEST SERPL-MCNC: 157 MG/DL (ref ?–200)
CO2 SERPL-SCNC: 30 MMOL/L (ref 21–32)
CREAT SERPL-MCNC: 0.71 MG/DL (ref 0.6–1.3)
EOSINOPHIL # BLD AUTO: 0.12 THOUSAND/ÂΜL (ref 0–0.61)
EOSINOPHIL NFR BLD AUTO: 2 % (ref 0–6)
ERYTHROCYTE [DISTWIDTH] IN BLOOD BY AUTOMATED COUNT: 13.2 % (ref 11.6–15.1)
GFR SERPL CREATININE-BSD FRML MDRD: 93 ML/MIN/1.73SQ M
GLUCOSE P FAST SERPL-MCNC: 123 MG/DL (ref 65–99)
HCT VFR BLD AUTO: 42.8 % (ref 34.8–46.1)
HDLC SERPL-MCNC: 33 MG/DL
HGB BLD-MCNC: 13.2 G/DL (ref 11.5–15.4)
IMM GRANULOCYTES # BLD AUTO: 0.02 THOUSAND/UL (ref 0–0.2)
IMM GRANULOCYTES NFR BLD AUTO: 0 % (ref 0–2)
INR PPP: 0.94 (ref 0.85–1.19)
LDLC SERPL CALC-MCNC: 79 MG/DL (ref 0–100)
LYMPHOCYTES # BLD AUTO: 2.06 THOUSANDS/ÂΜL (ref 0.6–4.47)
LYMPHOCYTES NFR BLD AUTO: 41 % (ref 14–44)
MCH RBC QN AUTO: 25.9 PG (ref 26.8–34.3)
MCHC RBC AUTO-ENTMCNC: 30.8 G/DL (ref 31.4–37.4)
MCV RBC AUTO: 84 FL (ref 82–98)
MONOCYTES # BLD AUTO: 0.35 THOUSAND/ÂΜL (ref 0.17–1.22)
MONOCYTES NFR BLD AUTO: 7 % (ref 4–12)
NEUTROPHILS # BLD AUTO: 2.49 THOUSANDS/ÂΜL (ref 1.85–7.62)
NEUTS SEG NFR BLD AUTO: 50 % (ref 43–75)
NRBC BLD AUTO-RTO: 0 /100 WBCS
PLATELET # BLD AUTO: 178 THOUSANDS/UL (ref 149–390)
PMV BLD AUTO: 9.8 FL (ref 8.9–12.7)
POTASSIUM SERPL-SCNC: 4.1 MMOL/L (ref 3.5–5.3)
PROT SERPL-MCNC: 8.1 G/DL (ref 6.4–8.4)
PROTHROMBIN TIME: 12.9 SECONDS (ref 12.3–15)
RBC # BLD AUTO: 5.1 MILLION/UL (ref 3.81–5.12)
SL AMB POCT HEMOGLOBIN AIC: 6.4 (ref ?–6.5)
SODIUM SERPL-SCNC: 138 MMOL/L (ref 135–147)
TRIGL SERPL-MCNC: 223 MG/DL (ref ?–150)
WBC # BLD AUTO: 5.06 THOUSAND/UL (ref 4.31–10.16)

## 2025-04-10 PROCEDURE — 36415 COLL VENOUS BLD VENIPUNCTURE: CPT

## 2025-04-10 PROCEDURE — 91320 SARSCV2 VAC 30MCG TRS-SUC IM: CPT

## 2025-04-10 PROCEDURE — 90480 ADMN SARSCOV2 VAC 1/ONLY CMP: CPT

## 2025-04-10 PROCEDURE — 85025 COMPLETE CBC W/AUTO DIFF WBC: CPT

## 2025-04-10 PROCEDURE — 83036 HEMOGLOBIN GLYCOSYLATED A1C: CPT

## 2025-04-10 PROCEDURE — 3074F SYST BP LT 130 MM HG: CPT

## 2025-04-10 PROCEDURE — 87522 HEPATITIS C REVRS TRNSCRPJ: CPT

## 2025-04-10 PROCEDURE — 3079F DIAST BP 80-89 MM HG: CPT

## 2025-04-10 PROCEDURE — 85610 PROTHROMBIN TIME: CPT

## 2025-04-10 PROCEDURE — 87521 HEPATITIS C PROBE&RVRS TRNSC: CPT

## 2025-04-10 PROCEDURE — 99214 OFFICE O/P EST MOD 30 MIN: CPT

## 2025-04-10 PROCEDURE — 82043 UR ALBUMIN QUANTITATIVE: CPT

## 2025-04-10 PROCEDURE — 80053 COMPREHEN METABOLIC PANEL: CPT

## 2025-04-10 PROCEDURE — 80061 LIPID PANEL: CPT

## 2025-04-10 PROCEDURE — 82570 ASSAY OF URINE CREATININE: CPT

## 2025-04-10 RX ORDER — PEN NEEDLE, DIABETIC 32GX 5/32"
NEEDLE, DISPOSABLE MISCELLANEOUS
Qty: 100 EACH | Refills: 3 | Status: SHIPPED | OUTPATIENT
Start: 2025-04-10

## 2025-04-10 NOTE — ASSESSMENT & PLAN NOTE
Lab Results   Component Value Date    HGBA1C 6.4 04/10/2025   Continue Jardiance 10 mg daily & metformin 500 mg BID & Lipitor 40 mg daily. Start Ozempic.     Orders:    Diabetic foot exam; Future    POCT hemoglobin A1c    Albumin / creatinine urine ratio    Lipid Panel with Direct LDL reflex; Future    Ambulatory Referral to Podiatry; Future    semaglutide, 0.25 or 0.5 mg/dose, (Ozempic, 0.25 or 0.5 MG/DOSE,) 2 mg/3 mL injection pen; 0.25 mg under the skin every 7 days for 4 doses (28 days), THEN 0.5 mg under the skin every 7 days    Insulin Pen Needle (BD Pen Needle Maribel U/F) 32G X 4 MM MISC; Use daily as directed with insulin pen

## 2025-04-10 NOTE — PROGRESS NOTES
Name: Jeri Chandler      : 1965      MRN: [id number]  Encounter Provider: JULIO Sarmiento  Encounter Date: 4/10/2025   Encounter department: [facility]  :  Assessment & Plan  Type 2 diabetes mellitus without complication, without long-term current use of insulin ([facility])    Lab Results   Component Value Date    HGBA1C 6.4 04/10/2025   Continue Jardiance 10 mg daily & metformin 500 mg BID & Lipitor 40 mg daily. Start Ozempic.     Orders:    Diabetic foot exam; Future    POCT hemoglobin A1c    Albumin / creatinine urine ratio    Lipid Panel with Direct LDL reflex; Future    Ambulatory Referral to Podiatry; Future    semaglutide, 0.25 or 0.5 mg/dose, (Ozempic, 0.25 or 0.5 MG/DOSE,) 2 mg/3 mL injection pen; 0.25 mg under the skin every 7 days for 4 doses (28 days), THEN 0.5 mg under the skin every 7 days    Insulin Pen Needle (BD Pen Needle Maribel U/F) 32G X 4 MM MISC; Use daily as directed with insulin pen    Essential hypertension  BP Readings from Last 3 Encounters:   04/10/25 120/80   25 138/98   12/10/24 110/78    - At goal. Continue amlodipine 5 mg & hctz 25 mg daily.  -reviewed eating a low salt diet (such as the DASH diet), limiting alcohol, exercising, and wt loss           Callus of foot    Orders:    salicylic acid 17 % gel; Apply topically daily    Need for COVID-19 vaccine    Orders:    COVID-19 Pfizer mRNA vaccine 12 yr and older (Comirnaty pre-filled syringe)        BMI Counseling: Body mass index is 39.49 kg/m². The BMI is above normal. Nutrition recommendations include decreasing portion sizes, encouraging healthy choices of fruits and vegetables, decreasing fast food intake, consuming healthier snacks, limiting drinks that contain sugar, moderation in carbohydrate intake, increasing intake of lean protein, reducing intake of saturated and trans fat and reducing intake of cholesterol. Exercise recommendations include moderate physical activity 150  "minutes/week. No pharmacotherapy was ordered. Rationale for BMI follow-up plan is due to patient being overweight or obese.       History of Present Illness   Jeri Chandler is a 59 y.o. female  has a past medical history of Asthma, Drug overdose, History of hepatitis C, Hypertension, Methadone adverse reaction, initial encounter, Psychiatric disorder, and Substance abuse (HCC).  has a past surgical history that includes Cholecystectomy and  section.    Patient is a 59 year old female with history of HTN, DM, Obesity,  chronic bilateral low back pain, tobacco dependence and lower extremity pain who is presenting for her diabetic follow up. Patient reports checking her blood glucose daily, her fasting blood sugar is usually less than 150 though she cannot remember the number  exactly. She also checks her post prandial number however does not remember.  Additionally patient is reporting bilateral foot and leg pain at night which makes it difficult to sleep. She is also requesting ozempic for her diabetes and weight loss.       Review of Systems   Constitutional: Negative.  Negative for chills and fever.   HENT:  Negative for ear pain and sore throat.    Eyes:  Negative for pain and visual disturbance.   Respiratory:  Negative for cough and shortness of breath.    Cardiovascular:  Negative for chest pain and palpitations.   Gastrointestinal:  Negative for abdominal pain and vomiting.   Genitourinary:  Negative for dysuria and hematuria.   Musculoskeletal:  Negative for arthralgias and back pain.   Skin:  Negative for color change and rash.   Neurological:  Negative for seizures and syncope.   All other systems reviewed and are negative.      Objective   /80 (BP Location: Left arm, Patient Position: Sitting, Cuff Size: Standard)   Pulse 68   Temp 98 °F (36.7 °C) (Temporal)   Resp 18   Ht 5' 1\" (1.549 m)   Wt 94.8 kg (209 lb)   LMP 2019 (Approximate)   SpO2 97%   BMI 39.49 kg/m²      Physical " Exam  Vitals and nursing note reviewed.   Constitutional:       General: She is not in acute distress.     Appearance: Normal appearance. She is well-developed. She is obese.   HENT:      Head: Normocephalic and atraumatic.      Right Ear: External ear normal.      Left Ear: External ear normal.      Nose: Nose normal.   Eyes:      Conjunctiva/sclera: Conjunctivae normal.   Cardiovascular:      Rate and Rhythm: Normal rate and regular rhythm.      Pulses: Normal pulses. no weak pulses.           Dorsalis pedis pulses are 2+ on the right side and 2+ on the left side.        Posterior tibial pulses are 2+ on the right side and 2+ on the left side.      Heart sounds: Murmur heard.   Pulmonary:      Effort: Pulmonary effort is normal. No respiratory distress.      Breath sounds: Normal breath sounds.   Abdominal:      Palpations: Abdomen is soft.      Tenderness: There is no abdominal tenderness.   Musculoskeletal:         General: No swelling. Normal range of motion.      Cervical back: Normal range of motion and neck supple.   Feet:      Right foot:      Skin integrity: Callus and dry skin present. No ulcer, skin breakdown, erythema or warmth.      Left foot:      Skin integrity: Callus and dry skin present. No ulcer, skin breakdown, erythema or warmth.   Skin:     General: Skin is warm and dry.      Capillary Refill: Capillary refill takes less than 2 seconds.   Neurological:      General: No focal deficit present.      Mental Status: She is alert and oriented to person, place, and time. Mental status is at baseline.   Psychiatric:         Mood and Affect: Mood normal.         Behavior: Behavior normal.         Thought Content: Thought content normal.         Judgment: Judgment normal.     Patient's shoes and socks removed.    Right Foot/Ankle   Right Foot Inspection  Skin Exam: skin normal, skin intact, dry skin, callus and callus. No warmth, no erythema, no maceration, no abnormal color, no pre-ulcer and no ulcer.      Toe Exam: ROM and strength within normal limits.     Sensory   Proprioception: intact  Monofilament testing: intact    Vascular  Capillary refills: < 3 seconds  The right DP pulse is 2+. The right PT pulse is 2+.     Left Foot/Ankle  Left Foot Inspection  Skin Exam: skin normal, skin intact, dry skin and callus. No warmth, no erythema, no maceration, normal color, no pre-ulcer and no ulcer.     Toe Exam: ROM and strength within normal limits.     Sensory   Proprioception: intact  Monofilament testing: intact    Vascular  Capillary refills: < 3 seconds  The left DP pulse is 2+. The left PT pulse is 2+.     Assign Risk Category  No deformity present  No loss of protective sensation  No weak pulses  Risk: 0

## 2025-04-10 NOTE — ASSESSMENT & PLAN NOTE
BP Readings from Last 3 Encounters:   04/10/25 120/80   01/28/25 138/98   12/10/24 110/78    - At goal. Continue amlodipine 5 mg & hctz 25 mg daily.  -reviewed eating a low salt diet (such as the DASH diet), limiting alcohol, exercising, and wt loss

## 2025-04-11 LAB
CREAT UR-MCNC: 81.3 MG/DL
EST. AVERAGE GLUCOSE BLD GHB EST-MCNC: 143 MG/DL
HBA1C MFR BLD: 6.6 %
HCV RNA SERPL NAA+PROBE-ACNC: NOT DETECTED K[IU]/ML
MICROALBUMIN UR-MCNC: 66.6 MG/L
MICROALBUMIN/CREAT 24H UR: 82 MG/G CREATININE (ref 0–30)

## 2025-04-14 ENCOUNTER — TELEMEDICINE (OUTPATIENT)
Dept: FAMILY MEDICINE CLINIC | Facility: CLINIC | Age: 60
End: 2025-04-14

## 2025-04-14 DIAGNOSIS — E78.2 MIXED HYPERLIPIDEMIA: Primary | ICD-10-CM

## 2025-04-14 PROCEDURE — NC001 PR NO CHARGE

## 2025-04-14 RX ORDER — ATORVASTATIN CALCIUM 80 MG/1
80 TABLET, FILM COATED ORAL DAILY
Qty: 100 TABLET | Refills: 3 | Status: SHIPPED | OUTPATIENT
Start: 2025-04-14

## 2025-04-14 NOTE — PROGRESS NOTES
Virtual Regular VisitName: Jeri Chandler      : 1965      MRN: [id number]  Encounter Provider: JULIO Sarmiento  Encounter Date: 2025   Encounter department: [facility]  :  Assessment & Plan  Mixed hyperlipidemia  Discussed lifestyle management such as healthy snacking, limiting sugary beverages, exercise, eating low-fat food. She is to start ozempic soon which will hopefully help with weight loss. Will increase Lipitor from 40 to 80 mg daily & check labs in 3 months.  Orders:  •  Lipid Panel with Direct LDL reflex; Future  •  atorvastatin (LIPITOR) 80 mg tablet; Take 1 tablet (80 mg total) by mouth daily        History of Present Illness     Jeri Chandler is a 59 y.o. female  has a past medical history of Asthma, Drug overdose, History of hepatitis C, Hypertension, Methadone adverse reaction, initial encounter, Psychiatric disorder, and Substance abuse (HCC).  has a past surgical history that includes Cholecystectomy and  section.    Visit scheduled today to discuss elevated triglyceride levels. Jeri's daughter was part of the visit today. She reports frequent unhealthy snacking and drinking soda in excess. She is compliant with Lipitor 40 mg daily.    Review of Systems   Constitutional:  Negative for chills and fever.   HENT:  Negative for ear pain and sore throat.    Eyes:  Negative for pain and visual disturbance.   Respiratory:  Negative for cough and shortness of breath.    Cardiovascular:  Negative for chest pain and palpitations.   Gastrointestinal:  Negative for abdominal pain and vomiting.   Genitourinary:  Negative for dysuria and hematuria.   Musculoskeletal:  Negative for arthralgias and back pain.   Skin:  Negative for color change and rash.   Neurological:  Negative for seizures and syncope.   All other systems reviewed and are negative.      Objective   LMP 2019 (Approximate)     Physical Exam  Neurological:      General: No  focal deficit present.      Mental Status: She is alert and oriented to person, place, and time. Mental status is at baseline.      Motor: No weakness.   Psychiatric:         Mood and Affect: Mood normal.         Behavior: Behavior normal.         Thought Content: Thought content normal.         Judgment: Judgment normal.       Administrative Statements   Encounter provider JULIO Sarmiento    The Patient is located at Home and in the following state in which I hold an active license [place].    The patient was identified by name and date of birth. Jeri Chandler was informed that this is a telemedicine visit and that the visit is being conducted through Telephone.  My office door was closed. No one else was in the room.  She acknowledged consent and understanding of privacy and security of the video platform. The patient has agreed to participate and understands they can discontinue the visit at any time.    I have spent a total time of 10 minutes in caring for this patient on the day of the visit/encounter including Instructions for management, Patient and family education, Importance of tx compliance, Risk factor reductions, Impressions, Counseling / Coordination of care, Documenting in the medical record, Reviewing/placing orders in the medical record (including tests, medications, and/or procedures), and Obtaining or reviewing history  , not including the time spent for establishing the audio/video connection.

## 2025-04-26 ENCOUNTER — HOSPITAL ENCOUNTER (EMERGENCY)
Facility: HOSPITAL | Age: 60
Discharge: HOME | End: 2025-04-26
Attending: EMERGENCY MEDICINE
Payer: COMMERCIAL

## 2025-04-26 VITALS
OXYGEN SATURATION: 93 % | BODY MASS INDEX: 38.66 KG/M2 | RESPIRATION RATE: 20 BRPM | SYSTOLIC BLOOD PRESSURE: 151 MMHG | TEMPERATURE: 98.3 F | WEIGHT: 204.59 LBS | DIASTOLIC BLOOD PRESSURE: 89 MMHG | HEART RATE: 70 BPM

## 2025-04-26 DIAGNOSIS — K04.7 DENTAL INFECTION: Primary | ICD-10-CM

## 2025-04-26 DIAGNOSIS — K08.89 DENTALGIA: ICD-10-CM

## 2025-04-26 PROCEDURE — 99283 EMERGENCY DEPT VISIT LOW MDM: CPT

## 2025-04-26 PROCEDURE — 99284 EMERGENCY DEPT VISIT MOD MDM: CPT

## 2025-04-26 RX ORDER — IBUPROFEN 400 MG/1
400 TABLET, FILM COATED ORAL EVERY 6 HOURS PRN
Qty: 30 TABLET | Refills: 0 | Status: SHIPPED | OUTPATIENT
Start: 2025-04-26

## 2025-04-26 RX ORDER — LIDOCAINE HYDROCHLORIDE 20 MG/ML
15 SOLUTION OROPHARYNGEAL ONCE
Status: COMPLETED | OUTPATIENT
Start: 2025-04-26 | End: 2025-04-26

## 2025-04-26 RX ORDER — CLINDAMYCIN HYDROCHLORIDE 150 MG/1
450 CAPSULE ORAL EVERY 8 HOURS SCHEDULED
Qty: 63 CAPSULE | Refills: 0 | Status: SHIPPED | OUTPATIENT
Start: 2025-04-26 | End: 2025-05-03

## 2025-04-26 RX ORDER — IBUPROFEN 600 MG/1
600 TABLET, FILM COATED ORAL ONCE
Status: COMPLETED | OUTPATIENT
Start: 2025-04-26 | End: 2025-04-26

## 2025-04-26 RX ORDER — ACETAMINOPHEN 500 MG
500 TABLET ORAL EVERY 6 HOURS PRN
Qty: 30 TABLET | Refills: 0 | Status: SHIPPED | OUTPATIENT
Start: 2025-04-26

## 2025-04-26 RX ADMIN — IBUPROFEN 600 MG: 600 TABLET, FILM COATED ORAL at 13:06

## 2025-04-26 RX ADMIN — LIDOCAINE HYDROCHLORIDE 15 ML: 20 SOLUTION ORAL at 13:06

## 2025-04-26 NOTE — ED PROVIDER NOTES
"Time reflects when diagnosis was documented in both MDM as applicable and the Disposition within this note       Time User Action Codes Description Comment    4/26/2025 12:56 PM Diana Reddy [K04.7] Dental infection     4/26/2025 12:56 PM Diana Reddy [K08.89] Dentalgia           ED Disposition       ED Disposition   Discharge    Condition   Stable    Date/Time   Sat Apr 26, 2025 12:56 PM    Comment   Jeri Villegaso discharge to home/self care.                   Assessment & Plan       Medical Decision Making  No dental abscess to drain today. Consistent with possible dental infection.   Will treat as infection, discussed supportive care and pain medication management.   Information provided for dentist follow up.   Pt stable at time of discharge, vital signs reviewed, questions answered. Strict ER return precautions provided/discussed and were well understood by patient. Patient's vitals, labs and/or imaging results, diagnosis, and treatment plan were discussed with the patient. All new and/or changed medications were discussed - specifically to include route of administration, how often to take, when to take, and the pharmacy they were sent to. Strict return precautions as well as close follow up with PCP was discussed with the patient and the patient was agreeable to my recommendations.  Patient verbally acknowledged understanding. All labs, imaging were reviewed and used in the medical decision making process (if ordered).     Portions of this chart may have been written with voice recognition software.  Occasional grammatical errors, wrong word or \"sound a like\" substitutions may have occurred due to software limitations.  Please read carefully and use context to recognize where substitutions have occurred.      Problems Addressed:  Dental infection: acute illness or injury  Dentalgia: acute illness or injury    Risk  OTC drugs.  Prescription drug management.             Medications   Lidocaine " Viscous HCl (XYLOCAINE) 2 % mucosal solution 15 mL (15 mL Swish & Spit Given 25 1306)   ibuprofen (MOTRIN) tablet 600 mg (600 mg Oral Given 25 1306)       ED Risk Strat Scores                    No data recorded        SBIRT 20yo+      Flowsheet Row Most Recent Value   DAVY: How many times in the past year have you...    Used an illegal drug or used a prescription medication for non-medical reasons? Never Filed at: 2025 1247                            History of Present Illness       Chief Complaint   Patient presents with    Dental Pain     Left sided dental pain for 3 days. Pt reports it feels like it is more painful and swollen. No meds PTA.        Past Medical History:   Diagnosis Date    Asthma     Drug overdose 2020    History of hepatitis C 2020    Hypertension     Methadone adverse reaction, initial encounter     Psychiatric disorder     Substance abuse (HCC)       Past Surgical History:   Procedure Laterality Date     SECTION      CHOLECYSTECTOMY        Family History   Problem Relation Age of Onset    No Known Problems Mother     Throat cancer Father     No Known Problems Sister     No Known Problems Daughter     No Known Problems Maternal Grandmother     No Known Problems Maternal Grandfather     No Known Problems Paternal Grandmother     No Known Problems Paternal Grandfather       Social History     Tobacco Use    Smoking status: Every Day     Current packs/day: 0.50     Types: Cigarettes    Smokeless tobacco: Never   Vaping Use    Vaping status: Never Used   Substance Use Topics    Alcohol use: Not Currently    Drug use: Not Currently     Types: Cocaine, Heroin      E-Cigarette/Vaping    E-Cigarette Use Never User       E-Cigarette/Vaping Substances    Nicotine No     THC No     CBD No     Flavoring No     Other No     Unknown No       I have reviewed and agree with the history as documented.     Jeri is a 59 year old female presenting to the ED for dental pain on the  left lower jaw x three days. Has never had this before. Does not follow up with dentist.         Review of Systems   Constitutional:  Negative for chills and fever.   HENT:  Positive for dental problem.    Eyes:  Negative for photophobia and visual disturbance.   Respiratory:  Negative for cough and shortness of breath.    Cardiovascular:  Negative for chest pain and palpitations.   Neurological:  Negative for light-headedness and headaches.           Objective       ED Triage Vitals   Temperature Pulse Blood Pressure Respirations SpO2 Patient Position - Orthostatic VS   04/26/25 1234 04/26/25 1234 04/26/25 1234 04/26/25 1234 04/26/25 1234 04/26/25 1234   98.3 °F (36.8 °C) 70 151/89 20 93 % Sitting      Temp Source Heart Rate Source BP Location FiO2 (%) Pain Score    04/26/25 1234 04/26/25 1234 04/26/25 1234 -- 04/26/25 1306    Oral Monitor Left arm  8      Vitals      Date and Time Temp Pulse SpO2 Resp BP Pain Score FACES Pain Rating User   04/26/25 1306 -- -- -- -- -- 8 -- TW   04/26/25 1234 98.3 °F (36.8 °C) 70 93 % 20 151/89 -- -- [place]            Physical Exam  Vitals reviewed.   Constitutional:       General: She is not in acute distress.     Appearance: Normal appearance. She is not ill-appearing or toxic-appearing.   HENT:      Head: Normocephalic and atraumatic.      Right Ear: External ear normal.      Left Ear: External ear normal.      Nose: Nose normal.      Mouth/Throat:      Comments: Two broken teeth to the left lower jaw with most of the teeth missing. No dental abscess. There is gingivitis surrounding the affected teeth.   Cardiovascular:      Rate and Rhythm: Normal rate and regular rhythm.      Pulses: Normal pulses.   Pulmonary:      Effort: Pulmonary effort is normal.   Musculoskeletal:         General: Normal range of motion.      Cervical back: Normal range of motion. No rigidity or tenderness.   Lymphadenopathy:      Cervical: No cervical adenopathy.   Skin:     General: Skin is warm and dry.       Capillary Refill: Capillary refill takes less than 2 seconds.   Neurological:      General: No focal deficit present.      Mental Status: She is alert.   Psychiatric:         Mood and Affect: Mood normal.         Behavior: Behavior normal.         Results Reviewed       None            No orders to display       Procedures    ED Medication and Procedure Management   Prior to Admission Medications   Prescriptions Last Dose Informant Patient Reported? Taking?   Blood Glucose Monitoring Suppl (OneTouch Verio Reflect) w/Device KIT  Self No No   Sig: Check blood sugars three times daily. Please substitute with appropriate alternative as covered by patient's insurance. Dx: E11.65   Empagliflozin (JARDIANCE) 10 MG TABS tablet   No No   Sig: Take 1 tablet (10 mg total) by mouth daily   Insulin Pen Needle (BD Pen Needle Maribel U/F) 32G X 4 MM MISC   No No   Sig: Use daily as directed with insulin pen   OneTouch Delica Lancets 33G MISC   No No   Sig: Check blood sugars three times daily. Please substitute with appropriate alternative as covered by patient's insurance. Dx: E11.65   albuterol (Ventolin HFA) 90 mcg/act inhaler   No No   Sig: Inhale 2 puffs every 6 (six) hours as needed for wheezing   amLODIPine (NORVASC) 5 mg tablet   No No   Sig: Take 1 tablet (5 mg total) by mouth daily   atorvastatin (LIPITOR) 80 mg tablet   No No   Sig: Take 1 tablet (80 mg total) by mouth daily   glucose blood (OneTouch Verio) test strip   No No   Sig: Check blood sugars three times daily. Please substitute with appropriate alternative as covered by patient's insurance. Dx: E11.65   hydroCHLOROthiazide 25 mg tablet   No No   Sig: Take 1 tablet (25 mg total) by mouth daily   metFORMIN (GLUCOPHAGE) 500 mg tablet   No No   Sig: Take 1 tablet (500 mg total) by mouth 2 (two) times a day with meals   nicotine (NICODERM CQ) 7 mg/24hr TD 24 hr patch   No No   Sig: Place 1 patch on the skin over 24 hours every 24 hours   nicotine polacrilex  (COMMIT) 2 MG lozenge   No No   Sig: Apply 1 lozenge (2 mg total) to the mouth or throat as needed for smoking cessation   polyethylene glycol (GLYCOLAX) 17 GM/SCOOP powder   No No   Sig: Take 17 g by mouth daily   salicylic acid 17 % gel   No No   Sig: Apply topically daily   semaglutide, 0.25 or 0.5 mg/dose, (Ozempic, 0.25 or 0.5 MG/DOSE,) 2 mg/3 mL injection pen   No No   Si.25 mg under the skin every 7 days for 4 doses (28 days), THEN 0.5 mg under the skin every 7 days      Facility-Administered Medications: None     Discharge Medication List as of 2025  1:00 PM        START taking these medications    Details   acetaminophen (TYLENOL) 500 mg tablet Take 1 tablet (500 mg total) by mouth every 6 (six) hours as needed for mild pain, Starting Sat 2025, Normal      clindamycin (CLEOCIN) 150 mg capsule Take 3 capsules (450 mg total) by mouth every 8 (eight) hours for 7 days, Starting Sat 2025, Until Sat 5/3/2025, Normal      ibuprofen (MOTRIN) 400 mg tablet Take 1 tablet (400 mg total) by mouth every 6 (six) hours as needed for mild pain, Starting Sat 2025, Normal           CONTINUE these medications which have NOT CHANGED    Details   albuterol (Ventolin HFA) 90 mcg/act inhaler Inhale 2 puffs every 6 (six) hours as needed for wheezing, Starting Tue 12/10/2024, Normal      amLODIPine (NORVASC) 5 mg tablet Take 1 tablet (5 mg total) by mouth daily, Starting Tue 12/10/2024, Normal      atorvastatin (LIPITOR) 80 mg tablet Take 1 tablet (80 mg total) by mouth daily, Starting 2025, Normal      Blood Glucose Monitoring Suppl (OneTouch Verio Reflect) w/Device KIT Check blood sugars three times daily. Please substitute with appropriate alternative as covered by patient's insurance. Dx: E11.65, Normal      Empagliflozin (JARDIANCE) 10 MG TABS tablet Take 1 tablet (10 mg total) by mouth daily, Starting Tue 12/10/2024, Until 2025, Normal      glucose blood (OneTouch Verio) test strip  Check blood sugars three times daily. Please substitute with appropriate alternative as covered by patient's insurance. Dx: E11.65, Normal      hydroCHLOROthiazide 25 mg tablet Take 1 tablet (25 mg total) by mouth daily, Starting Tue 12/10/2024, Normal      Insulin Pen Needle (BD Pen Needle Maribel U/F) 32G X 4 MM MISC Use daily as directed with insulin pen, Normal      metFORMIN (GLUCOPHAGE) 500 mg tablet Take 1 tablet (500 mg total) by mouth 2 (two) times a day with meals, Starting Tue 12/10/2024, Normal      nicotine (NICODERM CQ) 7 mg/24hr TD 24 hr patch Place 1 patch on the skin over 24 hours every 24 hours, Starting Tue 12/10/2024, Normal      nicotine polacrilex (COMMIT) 2 MG lozenge Apply 1 lozenge (2 mg total) to the mouth or throat as needed for smoking cessation, Starting Tue 12/10/2024, Normal      OneTouch Delica Lancets 33G MISC Check blood sugars three times daily. Please substitute with appropriate alternative as covered by patient's insurance. Dx: E11.65, Normal      polyethylene glycol (GLYCOLAX) 17 GM/SCOOP powder Take 17 g by mouth daily, Starting Tue 12/10/2024, Normal      salicylic acid 17 % gel Apply topically daily, Starting Thu 4/10/2025, Normal      semaglutide, 0.25 or 0.5 mg/dose, (Ozempic, 0.25 or 0.5 MG/DOSE,) 2 mg/3 mL injection pen 0.25 mg under the skin every 7 days for 4 doses (28 days), THEN 0.5 mg under the skin every 7 days, Normal           No discharge procedures on file.  ED SEPSIS DOCUMENTATION   Time reflects when diagnosis was documented in both MDM as applicable and the Disposition within this note       Time User Action Codes Description Comment    4/26/2025 12:56 PM Diana Reddy [K04.7] Dental infection     4/26/2025 12:56 PM Diana Reddy [K08.89] Dentalgia                  Diana Reddy PA-C  04/26/25 [id number]

## 2025-04-29 ENCOUNTER — HOSPITAL ENCOUNTER (EMERGENCY)
Facility: HOSPITAL | Age: 60
Discharge: HOME | End: 2025-04-29
Attending: EMERGENCY MEDICINE
Payer: COMMERCIAL

## 2025-04-29 ENCOUNTER — OFFICE VISIT (OUTPATIENT)
Dept: DENTISTRY | Facility: CLINIC | Age: 60
End: 2025-04-29

## 2025-04-29 VITALS — HEART RATE: 71 BPM | TEMPERATURE: 97.7 F | DIASTOLIC BLOOD PRESSURE: 108 MMHG | SYSTOLIC BLOOD PRESSURE: 162 MMHG

## 2025-04-29 VITALS
RESPIRATION RATE: 16 BRPM | BODY MASS INDEX: 38.28 KG/M2 | WEIGHT: 202.6 LBS | SYSTOLIC BLOOD PRESSURE: 150 MMHG | OXYGEN SATURATION: 95 % | TEMPERATURE: 98.2 F | DIASTOLIC BLOOD PRESSURE: 102 MMHG | HEART RATE: 85 BPM

## 2025-04-29 DIAGNOSIS — K04.7 DENTAL INFECTION: Primary | ICD-10-CM

## 2025-04-29 DIAGNOSIS — K08.9 EXTRACTION OF TOOTH NEEDED: Primary | ICD-10-CM

## 2025-04-29 PROCEDURE — D0220 INTRAORAL - PERIAPICAL FIRST RADIOGRAPHIC IMAGE: HCPCS

## 2025-04-29 PROCEDURE — 96372 THER/PROPH/DIAG INJ SC/IM: CPT

## 2025-04-29 PROCEDURE — 99282 EMERGENCY DEPT VISIT SF MDM: CPT

## 2025-04-29 PROCEDURE — D0140 LIMITED ORAL EVALUATION - PROBLEM FOCUSED: HCPCS

## 2025-04-29 PROCEDURE — 99284 EMERGENCY DEPT VISIT MOD MDM: CPT | Performed by: EMERGENCY MEDICINE

## 2025-04-29 RX ORDER — KETOROLAC TROMETHAMINE 30 MG/ML
15 INJECTION, SOLUTION INTRAMUSCULAR; INTRAVENOUS ONCE
Status: COMPLETED | OUTPATIENT
Start: 2025-04-29 | End: 2025-04-29

## 2025-04-29 RX ADMIN — KETOROLAC TROMETHAMINE 15 MG: 30 INJECTION, SOLUTION INTRAMUSCULAR at 15:23

## 2025-04-29 NOTE — DENTAL PROCEDURE DETAILS
Dental procedures in this visit     - LIMITED ORAL EVALUATION - PROBLEM FOCUSED (Completed)     Service provider: Surjit Triana DMD     Billing provider: Surjit Triana DMD     - INTRAORAL - PERIAPICAL FIRST RADIOGRAPHIC IMAGE 19,21 (Completed)     Service provider: Surjit Triana DMD     Billing provider: Surjit Triana DMD     Subjective   Patient ID: Jeri Chandler is a 59 y.o. female.  No chief complaint on file.    HPI  The following portions of the chart were reviewed this encounter and updated as appropriate:    Tobacco  Allergies  Meds  Problems  Med Hx  Surg Hx  Fam Hx           Objective   Soft Tissue Exam  No findings documented this visit      Dental Exam    Radiographic Interpretation:   Associated radiographs for today's visit were reviewed and finding(s) were discussed with the patient.   Findings include: PA tooth #19 and #21 tips  Hard Tissue Exam:  Decay noted - see charting and treatment plan  Reference tooth chart for additional findings.    Assessment & Plan   Problem List Items Addressed This Visit    None  Visit Diagnoses         Extraction of tooth needed    -  Primary    Relevant Orders    Ambulatory referral to Oral Maxillofacial Surgery    LIMITED ORAL EVALUATION - PROBLEM FOCUSED (Completed)    INTRAORAL - PERIAPICAL FIRST RADIOGRAPHIC IMAGE (Completed)        60 yo female presents with carious root tips #19, 21 causing pain and facial swelling. An [facility]AP [facility] referral was provided along with x-ray and provider sheet. Patient understood and agreed to the plan.    NV: [facility]AP [facility] referral

## 2025-04-29 NOTE — PROGRESS NOTES
Procedure Details  19,21  - INTRAORAL - PERIAPICAL FIRST RADIOGRAPHIC IMAGE   - LIMITED ORAL EVALUATION - PROBLEM FOCUSED  Dental procedures in this visit     - LIMITED ORAL EVALUATION - PROBLEM FOCUSED (Completed)     Service provider: Surjit Triana DMD     Billing provider: Surjit Triana DMD     - INTRAORAL - PERIAPICAL FIRST RADIOGRAPHIC IMAGE 19,21 (Completed)     Service provider: Surjit Triana DMD     Billing provider: Surjit Triana DMD     Subjective   Patient ID: Jeri Chandler is a 59 y.o. female.  No chief complaint on file.    HPI  The following portions of the chart were reviewed this encounter and updated as appropriate:    Tobacco  Allergies  Meds  Problems  Med Hx  Surg Hx  Fam Hx           Objective   Soft Tissue Exam  No findings documented this visit      Dental Exam    Radiographic Interpretation:   Associated radiographs for today's visit were reviewed and finding(s) were discussed with the patient.   Findings include: PA tooth #19 and #21 tips  Hard Tissue Exam:  Decay noted - see charting and treatment plan  Reference tooth chart for additional findings.    Assessment & Plan   Problem List Items Addressed This Visit    None  Visit Diagnoses         Extraction of tooth needed    -  Primary    Relevant Orders    Ambulatory referral to Oral Maxillofacial Surgery    LIMITED ORAL EVALUATION - PROBLEM FOCUSED (Completed)    INTRAORAL - PERIAPICAL FIRST RADIOGRAPHIC IMAGE (Completed)        58 yo female presents with carious root tips #19, 21 causing pain and facial swelling. An [facility] referral was provided along with x-ray and provider sheet. Patient understood and agreed to the plan.    NV: [facility] referral

## 2025-04-29 NOTE — ED PROVIDER NOTES
Time reflects when diagnosis was documented in both MDM as applicable and the Disposition within this note       Time User Action Codes Description Comment    2025  3:05 PM Morena Daniels Add [K04.7] Dental infection           ED Disposition       ED Disposition   Discharge    Condition   Stable    Date/Time     3:05 PM    Comment   Jeri Chandler discharge to home/self care.                   Assessment & Plan       Medical Decision Making  59-year-old female with left-sided dental pain without signs of abscess.  Patient already on clindamycin for the past day and a half.  Recommend she continue clindamycin until follow-up with dentist.      Risk  Prescription drug management.             Medications   ketorolac (TORADOL) injection 15 mg (15 mg Intramuscular Given 25 1523)       ED Risk Strat Scores                    No data recorded        SBIRT 22yo+      Flowsheet Row Most Recent Value   DAVY: How many times in the past year have you...    Used an illegal drug or used a prescription medication for non-medical reasons? Never Filed at: 2025 [id number]                            History of Present Illness       Chief Complaint   Patient presents with    Dental Pain     PT reports left sided dental pain that is worsening. Pt seen on  for same states she is taking Rx'd medications (clindamycin, tylenol, motrin). Pt states she is scheduled with the dentists on May 14th.        Past Medical History:   Diagnosis Date    Asthma     Drug overdose 2020    History of hepatitis C 2020    Hypertension     Methadone adverse reaction, initial encounter     Psychiatric disorder     Substance abuse (HCC)       Past Surgical History:   Procedure Laterality Date     SECTION      CHOLECYSTECTOMY        Family History   Problem Relation Age of Onset    No Known Problems Mother     Throat cancer Father     No Known Problems Sister     No Known Problems Daughter     No Known Problems Maternal  Grandmother     No Known Problems Maternal Grandfather     No Known Problems Paternal Grandmother     No Known Problems Paternal Grandfather       Social History     Tobacco Use    Smoking status: Every Day     Current packs/day: 0.50     Types: Cigarettes    Smokeless tobacco: Never   Vaping Use    Vaping status: Never Used   Substance Use Topics    Alcohol use: Not Currently    Drug use: Not Currently     Types: Cocaine, Heroin      E-Cigarette/Vaping    E-Cigarette Use Never User       E-Cigarette/Vaping Substances    Nicotine No     THC No     CBD No     Flavoring No     Other No     Unknown No       I have reviewed and agree with the history as documented.     59-year-old female presenting to the emergency department with left-sided dental pain has been ongoing for the past few days.  Has a dentist appointment but is not currently 15.  She is already on antibiotics clindamycin for this.        Review of Systems   Constitutional:  Negative for chills and fever.   HENT:  Positive for dental problem. Negative for ear pain and sore throat.    Eyes:  Negative for pain and visual disturbance.   Respiratory:  Negative for cough and shortness of breath.    Cardiovascular:  Negative for chest pain and palpitations.   Gastrointestinal:  Negative for abdominal pain and vomiting.   Genitourinary:  Negative for dysuria and hematuria.   Musculoskeletal:  Negative for arthralgias and back pain.   Skin:  Negative for color change and rash.   Neurological:  Negative for seizures and syncope.   All other systems reviewed and are negative.          Objective       ED Triage Vitals [04/29/25 1454]   Temperature Pulse Blood Pressure Respirations SpO2 Patient Position - Orthostatic VS   98.2 °F (36.8 °C) 85 (!) 150/102 16 95 % Sitting      Temp Source Heart Rate Source BP Location FiO2 (%) Pain Score    Oral Monitor Left arm -- --      Vitals      Date and Time Temp Pulse SpO2 Resp BP Pain Score FACES Pain Rating User   04/29/25  1454 98.2 °F (36.8 °C) 85 95 % 16 150/102 -- -- CJ            Physical Exam  Vitals and nursing note reviewed.   Constitutional:       General: She is not in acute distress.     Appearance: She is well-developed.   HENT:      Head: Normocephalic and atraumatic.      Mouth/Throat:      Comments: Left lower dental swelling and pain.  Eyes:      Conjunctiva/sclera: Conjunctivae normal.   Cardiovascular:      Rate and Rhythm: Normal rate and regular rhythm.      Heart sounds: No murmur heard.  Pulmonary:      Effort: Pulmonary effort is normal. No respiratory distress.      Breath sounds: Normal breath sounds.   Abdominal:      Palpations: Abdomen is soft.      Tenderness: There is no abdominal tenderness.   Musculoskeletal:         General: No swelling.      Cervical back: Neck supple.   Skin:     General: Skin is warm and dry.      Capillary Refill: Capillary refill takes less than 2 seconds.   Neurological:      Mental Status: She is alert.   Psychiatric:         Mood and Affect: Mood normal.         Results Reviewed       None            No orders to display       Procedures    ED Medication and Procedure Management   Prior to Admission Medications   Prescriptions Last Dose Informant Patient Reported? Taking?   Blood Glucose Monitoring Suppl (OneTouch Verio Reflect) w/Device KIT  Self No No   Sig: Check blood sugars three times daily. Please substitute with appropriate alternative as covered by patient's insurance. Dx: E11.65   Empagliflozin (JARDIANCE) 10 MG TABS tablet   No No   Sig: Take 1 tablet (10 mg total) by mouth daily   Insulin Pen Needle (BD Pen Needle Maribel U/F) 32G X 4 MM MISC   No No   Sig: Use daily as directed with insulin pen   OneTouch Delica Lancets 33G MISC   No No   Sig: Check blood sugars three times daily. Please substitute with appropriate alternative as covered by patient's insurance. Dx: E11.65   acetaminophen (TYLENOL) 500 mg tablet   No No   Sig: Take 1 tablet (500 mg total) by mouth every  6 (six) hours as needed for mild pain   albuterol (Ventolin HFA) 90 mcg/act inhaler   No No   Sig: Inhale 2 puffs every 6 (six) hours as needed for wheezing   amLODIPine (NORVASC) 5 mg tablet   No No   Sig: Take 1 tablet (5 mg total) by mouth daily   atorvastatin (LIPITOR) 80 mg tablet   No No   Sig: Take 1 tablet (80 mg total) by mouth daily   clindamycin (CLEOCIN) 150 mg capsule   No No   Sig: Take 3 capsules (450 mg total) by mouth every 8 (eight) hours for 7 days   glucose blood (OneTouch Verio) test strip   No No   Sig: Check blood sugars three times daily. Please substitute with appropriate alternative as covered by patient's insurance. Dx: E11.65   hydroCHLOROthiazide 25 mg tablet   No No   Sig: Take 1 tablet (25 mg total) by mouth daily   ibuprofen (MOTRIN) 400 mg tablet   No No   Sig: Take 1 tablet (400 mg total) by mouth every 6 (six) hours as needed for mild pain   metFORMIN (GLUCOPHAGE) 500 mg tablet   No No   Sig: Take 1 tablet (500 mg total) by mouth 2 (two) times a day with meals   nicotine (NICODERM CQ) 7 mg/24hr TD 24 hr patch   No No   Sig: Place 1 patch on the skin over 24 hours every 24 hours   nicotine polacrilex (COMMIT) 2 MG lozenge   No No   Sig: Apply 1 lozenge (2 mg total) to the mouth or throat as needed for smoking cessation   polyethylene glycol (GLYCOLAX) 17 GM/SCOOP powder   No No   Sig: Take 17 g by mouth daily   salicylic acid 17 % gel   No No   Sig: Apply topically daily   semaglutide, 0.25 or 0.5 mg/dose, (Ozempic, 0.25 or 0.5 MG/DOSE,) 2 mg/3 mL injection pen   No No   Si.25 mg under the skin every 7 days for 4 doses (28 days), THEN 0.5 mg under the skin every 7 days      Facility-Administered Medications: None     Discharge Medication List as of 2025  3:05 PM        CONTINUE these medications which have NOT CHANGED    Details   acetaminophen (TYLENOL) 500 mg tablet Take 1 tablet (500 mg total) by mouth every 6 (six) hours as needed for mild pain, Starting Sat 2025,  Normal      albuterol (Ventolin HFA) 90 mcg/act inhaler Inhale 2 puffs every 6 (six) hours as needed for wheezing, Starting Tue 12/10/2024, Normal      amLODIPine (NORVASC) 5 mg tablet Take 1 tablet (5 mg total) by mouth daily, Starting Tue 12/10/2024, Normal      atorvastatin (LIPITOR) 80 mg tablet Take 1 tablet (80 mg total) by mouth daily, Starting Mon 4/14/2025, Normal      Blood Glucose Monitoring Suppl (OneTouch Verio Reflect) w/Device KIT Check blood sugars three times daily. Please substitute with appropriate alternative as covered by patient's insurance. Dx: E11.65, Normal      clindamycin (CLEOCIN) 150 mg capsule Take 3 capsules (450 mg total) by mouth every 8 (eight) hours for 7 days, Starting Sat 4/26/2025, Until Sat 5/3/2025, Normal      Empagliflozin (JARDIANCE) 10 MG TABS tablet Take 1 tablet (10 mg total) by mouth daily, Starting Tue 12/10/2024, Until Fri 12/5/2025, Normal      glucose blood (OneTouch Verio) test strip Check blood sugars three times daily. Please substitute with appropriate alternative as covered by patient's insurance. Dx: E11.65, Normal      hydroCHLOROthiazide 25 mg tablet Take 1 tablet (25 mg total) by mouth daily, Starting Tue 12/10/2024, Normal      ibuprofen (MOTRIN) 400 mg tablet Take 1 tablet (400 mg total) by mouth every 6 (six) hours as needed for mild pain, Starting Sat 4/26/2025, Normal      Insulin Pen Needle (BD Pen Needle Maribel U/F) 32G X 4 MM MISC Use daily as directed with insulin pen, Normal      metFORMIN (GLUCOPHAGE) 500 mg tablet Take 1 tablet (500 mg total) by mouth 2 (two) times a day with meals, Starting Tue 12/10/2024, Normal      nicotine (NICODERM CQ) 7 mg/24hr TD 24 hr patch Place 1 patch on the skin over 24 hours every 24 hours, Starting Tue 12/10/2024, Normal      nicotine polacrilex (COMMIT) 2 MG lozenge Apply 1 lozenge (2 mg total) to the mouth or throat as needed for smoking cessation, Starting Tue 12/10/2024, Normal      OneTouch Delica Lancets 33G  MISC Check blood sugars three times daily. Please substitute with appropriate alternative as covered by patient's insurance. Dx: E11.65, Normal      polyethylene glycol (GLYCOLAX) 17 GM/SCOOP powder Take 17 g by mouth daily, Starting Tue 12/10/2024, Normal      salicylic acid 17 % gel Apply topically daily, Starting Thu 4/10/2025, Normal      semaglutide, 0.25 or 0.5 mg/dose, (Ozempic, 0.25 or 0.5 MG/DOSE,) 2 mg/3 mL injection pen 0.25 mg under the skin every 7 days for 4 doses (28 days), THEN 0.5 mg under the skin every 7 days, Normal           No discharge procedures on file.  ED SEPSIS DOCUMENTATION   Time reflects when diagnosis was documented in both MDM as applicable and the Disposition within this note       Time User Action Codes Description Comment    4/29/2025  3:05 PM Morena Daniels Add [K04.7] Dental infection                  Morena Daniels MD  04/30/25 7464

## 2025-05-06 ENCOUNTER — TELEMEDICINE (OUTPATIENT)
Dept: FAMILY MEDICINE CLINIC | Facility: CLINIC | Age: 60
End: 2025-05-06

## 2025-05-06 DIAGNOSIS — Z91.199 NO-SHOW FOR APPOINTMENT: Primary | ICD-10-CM

## 2025-05-06 PROCEDURE — 3080F DIAST BP >= 90 MM HG: CPT

## 2025-05-06 PROCEDURE — 3077F SYST BP >= 140 MM HG: CPT

## 2025-05-06 PROCEDURE — NOSHOW

## 2025-05-07 ENCOUNTER — TELEMEDICINE (OUTPATIENT)
Dept: FAMILY MEDICINE CLINIC | Facility: CLINIC | Age: 60
End: 2025-05-07

## 2025-05-07 DIAGNOSIS — E11.9 TYPE 2 DIABETES MELLITUS WITHOUT COMPLICATION, WITHOUT LONG-TERM CURRENT USE OF INSULIN (HCC): Primary | ICD-10-CM

## 2025-05-07 PROCEDURE — 98016 BRIEF COMUNICAJ TECH-BSD SVC: CPT

## 2025-05-07 NOTE — ASSESSMENT & PLAN NOTE
Lab Results   Component Value Date    HGBA1C 6.6 (H) 04/10/2025   Increase Ozempic from 0.5 mg weekly to 1 mg weekly.    Orders:    semaglutide, 1 mg/dose, (Ozempic) 4 mg/3 mL injection pen; Inject 0.75 mL (1 mg total) under the skin once a week

## 2025-05-07 NOTE — PROGRESS NOTES
Virtual Regular VisitName: Jeri Chandler      : 1965      MRN: [id number]  Encounter Provider: JULIO Sarmiento  Encounter Date: 2025   Encounter department: [facility]  :  Assessment & Plan  Type 2 diabetes mellitus without complication, without long-term current use of insulin (HCC)    Lab Results   Component Value Date    HGBA1C 6.6 (H) 04/10/2025   Increase Ozempic from 0.5 mg weekly to 1 mg weekly.    Orders:    semaglutide, 1 mg/dose, (Ozempic) 4 mg/3 mL injection pen; Inject 0.75 mL (1 mg total) under the skin once a week        History of Present Illness     Visit scheduled today for a DM last OV. She was started on Ozempic about one month ago which she is tolerating well. Denies abdominal pain, nausea, vomiting, diarrhea. She is on the 0.5 mg dose x 3 weeks.    Review of Systems   Constitutional:  Negative for chills and fever.   HENT:  Negative for ear pain and sore throat.    Eyes:  Negative for pain and visual disturbance.   Respiratory:  Negative for cough and shortness of breath.    Cardiovascular:  Negative for chest pain and palpitations.   Gastrointestinal:  Negative for abdominal pain and vomiting.   Genitourinary:  Negative for dysuria and hematuria.   Musculoskeletal:  Negative for arthralgias and back pain.   Skin:  Negative for color change and rash.   Neurological:  Negative for seizures and syncope.   All other systems reviewed and are negative.      Objective   LMP 2019 (Approximate)     Physical Exam  Neurological:      General: No focal deficit present.      Mental Status: She is alert and oriented to person, place, and time. Mental status is at baseline.   Psychiatric:         Mood and Affect: Mood normal.         Behavior: Behavior normal.         Thought Content: Thought content normal.         Judgment: Judgment normal.       Administrative Statements   Encounter provider JULIO Sarmiento    The Patient is  located at Home and in the following state in which I hold an active license [place].    The patient was identified by name and date of birth. Jeri Chandler was informed that this is a telemedicine visit and that the visit is being conducted through Telephone.  My office door was closed. No one else was in the room.  She acknowledged consent and understanding of privacy and security of the video platform. The patient has agreed to participate and understands they can discontinue the visit at any time.    I have spent a total time of 8 minutes in caring for this patient on the day of the visit/encounter including Risks and benefits of tx options, Instructions for management, Patient and family education, Importance of tx compliance, Documenting in the medical record, Reviewing/placing orders in the medical record (including tests, medications, and/or procedures), and Obtaining or reviewing history  , not including the time spent for establishing the audio/video connection.

## 2025-05-14 ENCOUNTER — OFFICE VISIT (OUTPATIENT)
Dept: DENTISTRY | Facility: CLINIC | Age: 60
End: 2025-05-14

## 2025-05-14 VITALS — SYSTOLIC BLOOD PRESSURE: 168 MMHG | DIASTOLIC BLOOD PRESSURE: 102 MMHG | TEMPERATURE: 97.6 F | HEART RATE: 82 BPM

## 2025-05-14 DIAGNOSIS — G89.29 CHRONIC DENTAL PAIN: ICD-10-CM

## 2025-05-14 DIAGNOSIS — K08.9 CHRONIC DENTAL PAIN: ICD-10-CM

## 2025-05-14 DIAGNOSIS — K08.89 PAIN, DENTAL: ICD-10-CM

## 2025-05-14 DIAGNOSIS — K04.7 DENTAL INFECTION: Primary | ICD-10-CM

## 2025-05-14 PROCEDURE — D0191 ASSESSMENT OF A PATIENT: HCPCS | Performed by: DENTIST

## 2025-05-14 NOTE — PROGRESS NOTES
NOTE: pt scheduled in Emer[facility] appt spot.  Used Danish interpretation for pt today.  She needs new referral.;eft the other one at [facility] when she went 5/8/25.  CC- states she was seen at [facility] for consult and had appt scheduled today- then had a phone call with insurance and they told her the appt would not be covered ?? Per pt.  NO radiographs taken today.  No tx except new referral.  Contacted referral specialist and they said S would have to explain it to her about insurance coverage.  Gave new referral to pt today and they will contact her again.  NOTE: pt is ambulatory alert and oriented. She is in no acute distress and has just finished her course of antibiotics.  Placed new STAT referral for pt today for [facility].  Instructed pt to call [facility] again today asap when she leaves here.

## 2025-06-06 ENCOUNTER — TELEPHONE (OUTPATIENT)
Dept: FAMILY MEDICINE CLINIC | Facility: CLINIC | Age: 60
End: 2025-06-06

## 2025-06-06 DIAGNOSIS — E11.9 TYPE 2 DIABETES MELLITUS WITHOUT COMPLICATION, WITHOUT LONG-TERM CURRENT USE OF INSULIN (HCC): ICD-10-CM

## 2025-06-06 NOTE — TELEPHONE ENCOUNTER
Patient is requesting for the following med is refilled too soon and states patients pharmacy says they dont have one until July but, patient is due for it this Sunday. Can someone please assist patient. Thank you.    semaglutide, 1 mg/dose, (Ozempic) 4 mg/3 mL injection pen